# Patient Record
Sex: FEMALE | Race: WHITE | Employment: OTHER | URBAN - METROPOLITAN AREA
[De-identification: names, ages, dates, MRNs, and addresses within clinical notes are randomized per-mention and may not be internally consistent; named-entity substitution may affect disease eponyms.]

---

## 2017-01-03 ENCOUNTER — TELEPHONE (OUTPATIENT)
Dept: ENDOCRINOLOGY CLINIC | Facility: CLINIC | Age: 72
End: 2017-01-03

## 2017-01-03 NOTE — TELEPHONE ENCOUNTER
Patient returned call. Let her know per AM DEXA scan was normal. Patient understands to continue calcium and vitamin D supplementation.

## 2017-01-03 NOTE — TELEPHONE ENCOUNTER
DXA is normal. Please let the patient know. She should c/w vit D and ca supplementation as before.    Thx.

## 2017-03-09 ENCOUNTER — APPOINTMENT (OUTPATIENT)
Dept: LAB | Age: 72
End: 2017-03-09
Attending: INTERNAL MEDICINE
Payer: MEDICARE

## 2017-03-09 ENCOUNTER — TELEPHONE (OUTPATIENT)
Dept: ENDOCRINOLOGY CLINIC | Facility: CLINIC | Age: 72
End: 2017-03-09

## 2017-03-09 DIAGNOSIS — E55.9 VITAMIN D DEFICIENCY: ICD-10-CM

## 2017-03-09 DIAGNOSIS — E03.9 HYPOTHYROIDISM, UNSPECIFIED TYPE: ICD-10-CM

## 2017-03-09 LAB
T4 FREE SERPL-MCNC: 0.89 NG/DL (ref 0.58–1.64)
TSH SERPL-ACNC: 4.48 UIU/ML (ref 0.34–5.6)

## 2017-03-09 PROCEDURE — 82306 VITAMIN D 25 HYDROXY: CPT

## 2017-03-09 PROCEDURE — 36415 COLL VENOUS BLD VENIPUNCTURE: CPT

## 2017-03-09 PROCEDURE — 84439 ASSAY OF FREE THYROXINE: CPT

## 2017-03-09 PROCEDURE — 84443 ASSAY THYROID STIM HORMONE: CPT

## 2017-03-09 NOTE — TELEPHONE ENCOUNTER
Please let the patient know her thyroid labs look better. When I saw her last time, she was on 100 mcg daily and was not feeling well. Dose was changed to alternate day 100/112 and her labs are better now. How does she feel? Any better?  If not, we can go

## 2017-03-09 NOTE — TELEPHONE ENCOUNTER
Called [facility] regarding results. She states she is starting to feel better on alternating doses.  She would like to keep alternating 100mcg and 112cg for now as symptoms improving and she will contact office if this changes and she wants to try increased dose

## 2017-03-10 LAB — 25(OH)D3 SERPL-MCNC: 27.8 NG/ML

## 2017-03-21 RX ORDER — LEVOTHYROXINE SODIUM 112 MCG
TABLET ORAL
Qty: 90 TABLET | Refills: 0 | Status: SHIPPED | OUTPATIENT
Start: 2017-03-21 | End: 2017-09-15

## 2017-06-28 PROBLEM — E66.01 SEVERE OBESITY (BMI 35.0-39.9) WITH COMORBIDITY (HCC): Chronic | Status: ACTIVE | Noted: 2017-06-28

## 2017-06-28 RX ORDER — METOPROLOL SUCCINATE 25 MG/1
TABLET, EXTENDED RELEASE ORAL
Qty: 180 TABLET | Refills: 0 | Status: SHIPPED | OUTPATIENT
Start: 2017-06-28 | End: 2017-11-28

## 2017-08-15 ENCOUNTER — APPOINTMENT (OUTPATIENT)
Dept: GENERAL RADIOLOGY | Facility: HOSPITAL | Age: 72
End: 2017-08-15
Attending: EMERGENCY MEDICINE
Payer: MEDICARE

## 2017-08-15 ENCOUNTER — HOSPITAL ENCOUNTER (EMERGENCY)
Facility: HOSPITAL | Age: 72
Discharge: HOME | End: 2017-08-15
Attending: EMERGENCY MEDICINE
Payer: MEDICARE

## 2017-08-15 VITALS
OXYGEN SATURATION: 98 % | DIASTOLIC BLOOD PRESSURE: 75 MMHG | RESPIRATION RATE: 20 BRPM | BODY MASS INDEX: 37.39 KG/M2 | SYSTOLIC BLOOD PRESSURE: 154 MMHG | WEIGHT: 211 LBS | TEMPERATURE: 97 F | HEART RATE: 58 BPM | HEIGHT: 63 IN

## 2017-08-15 DIAGNOSIS — M54.9 BACK PAIN WITHOUT RADIATION: Primary | ICD-10-CM

## 2017-08-15 LAB
BILIRUB UR QL: NEGATIVE
COLOR UR: YELLOW
GLUCOSE UR-MCNC: NEGATIVE MG/DL
HGB UR QL STRIP.AUTO: NEGATIVE
KETONES UR-MCNC: NEGATIVE MG/DL
NITRITE UR QL STRIP.AUTO: NEGATIVE
PH UR: 5 [PH] (ref 5–8)
PROT UR-MCNC: 30 MG/DL
RBC #/AREA URNS AUTO: 8 /HPF
SP GR UR STRIP: 1.02 (ref 1–1.03)
UROBILINOGEN UR STRIP-ACNC: <2
VIT C UR-MCNC: NEGATIVE MG/DL
WBC #/AREA URNS AUTO: 6 /HPF

## 2017-08-15 PROCEDURE — 72100 X-RAY EXAM L-S SPINE 2/3 VWS: CPT | Performed by: EMERGENCY MEDICINE

## 2017-08-15 PROCEDURE — 99283 EMERGENCY DEPT VISIT LOW MDM: CPT

## 2017-08-15 PROCEDURE — 81001 URINALYSIS AUTO W/SCOPE: CPT | Performed by: EMERGENCY MEDICINE

## 2017-08-15 PROCEDURE — 87086 URINE CULTURE/COLONY COUNT: CPT | Performed by: EMERGENCY MEDICINE

## 2017-08-15 RX ORDER — LIDOCAINE 50 MG/G
1 PATCH TOPICAL ONCE
Status: DISCONTINUED | OUTPATIENT
Start: 2017-08-15 | End: 2017-08-15

## 2017-08-15 RX ORDER — IBUPROFEN 400 MG/1
400 TABLET ORAL ONCE
Status: COMPLETED | OUTPATIENT
Start: 2017-08-15 | End: 2017-08-15

## 2017-08-15 RX ORDER — IBUPROFEN 600 MG/1
600 TABLET ORAL EVERY 8 HOURS PRN
Qty: 20 TABLET | Refills: 0 | Status: SHIPPED | OUTPATIENT
Start: 2017-08-15 | End: 2017-08-22

## 2017-08-15 NOTE — ED PROVIDER NOTES
Patient Seen in: [facility] AND [facility] Emergency Department    History   Patient presents with:  Back Pain (musculoskeletal)    Stated Complaint:     HPI    81-year-old female with history of hypertension, hypothyroidism, Ménière's disease, vertigo, here wit Surgical History:  No date: CARPAL TUNNEL RELEASE  No date: CHOLECYSTECTOMY  No date: COLONOSCOPY  2006: DEBRIDEMENT OF NAIL(S), 1-5      Comment: debridement of the med border of the left                great toenail  03-: ELECTROCARDIOGRAM, COMPLE • Hypertension Mother    • Thyroid Disorder Mother      thyroid problems   • Kidney Disease Mother      Chronic kidney disease   • Cancer Mother      Cancer-breast   • Breast Cancer Mother 80   • Cancer Brother      Cancer-prostate       Smoking status: and well-nourished. No distress. HENT:   Head: Normocephalic and atraumatic. Mouth/Throat: Oropharynx is clear and moist.   Eyes: EOM are normal. Pupils are equal, round, and reactive to light. Neck: Normal range of motion.    Cardiovascular: Normal r Seen /HPF       Imaging:  Xr Lumbar Spine (min 2 Views) (cpt=72100)    Result Date: 8/15/2017  CONCLUSION:  1. Mild degenerative anterior spondylolisthesis L4 relative L5 2. Mild S-shaped scoliosis lumbar spine. Multilevel spondylosis.  3. Mild chronic wedg

## 2017-09-15 ENCOUNTER — OFFICE VISIT (OUTPATIENT)
Dept: INTERNAL MEDICINE CLINIC | Facility: CLINIC | Age: 72
End: 2017-09-15

## 2017-09-15 ENCOUNTER — TELEPHONE (OUTPATIENT)
Dept: INTERNAL MEDICINE CLINIC | Facility: CLINIC | Age: 72
End: 2017-09-15

## 2017-09-15 ENCOUNTER — LAB ENCOUNTER (OUTPATIENT)
Dept: LAB | Age: 72
End: 2017-09-15
Attending: INTERNAL MEDICINE
Payer: MEDICARE

## 2017-09-15 VITALS
OXYGEN SATURATION: 97 % | HEIGHT: 63 IN | BODY MASS INDEX: 37.39 KG/M2 | HEART RATE: 61 BPM | SYSTOLIC BLOOD PRESSURE: 138 MMHG | TEMPERATURE: 99 F | DIASTOLIC BLOOD PRESSURE: 82 MMHG | WEIGHT: 211 LBS

## 2017-09-15 DIAGNOSIS — I10 ESSENTIAL HYPERTENSION: Primary | ICD-10-CM

## 2017-09-15 DIAGNOSIS — E03.9 HYPOTHYROIDISM, UNSPECIFIED TYPE: ICD-10-CM

## 2017-09-15 DIAGNOSIS — R73.9 ELEVATED BLOOD SUGAR: ICD-10-CM

## 2017-09-15 DIAGNOSIS — I10 ESSENTIAL HYPERTENSION: ICD-10-CM

## 2017-09-15 DIAGNOSIS — G89.29 CHRONIC MIDLINE LOW BACK PAIN WITHOUT SCIATICA: ICD-10-CM

## 2017-09-15 DIAGNOSIS — Z00.00 ROUTINE HEALTH MAINTENANCE: ICD-10-CM

## 2017-09-15 DIAGNOSIS — E55.9 VITAMIN D DEFICIENCY: ICD-10-CM

## 2017-09-15 DIAGNOSIS — E03.9 ACQUIRED HYPOTHYROIDISM: ICD-10-CM

## 2017-09-15 DIAGNOSIS — Z12.11 COLON CANCER SCREENING: ICD-10-CM

## 2017-09-15 DIAGNOSIS — R82.90 ABNORMAL URINALYSIS: ICD-10-CM

## 2017-09-15 DIAGNOSIS — M54.50 CHRONIC MIDLINE LOW BACK PAIN WITHOUT SCIATICA: ICD-10-CM

## 2017-09-15 LAB
25(OH)D3 SERPL-MCNC: 30.6 NG/ML
ALBUMIN SERPL BCP-MCNC: 4.2 G/DL (ref 3.5–4.8)
ALBUMIN/GLOB SERPL: 1.3 {RATIO} (ref 1–2)
ALP SERPL-CCNC: 63 U/L (ref 32–100)
ALT SERPL-CCNC: 22 U/L (ref 14–54)
ANION GAP SERPL CALC-SCNC: 9 MMOL/L (ref 0–18)
AST SERPL-CCNC: 23 U/L (ref 15–41)
BACTERIA UR QL AUTO: NEGATIVE /HPF
BASOPHILS # BLD: 0 K/UL (ref 0–0.2)
BASOPHILS NFR BLD: 0 %
BILIRUB SERPL-MCNC: 0.6 MG/DL (ref 0.3–1.2)
BILIRUB UR QL: NEGATIVE
BUN SERPL-MCNC: 8 MG/DL (ref 8–20)
BUN/CREAT SERPL: 8.3 (ref 10–20)
CALCIUM SERPL-MCNC: 9.9 MG/DL (ref 8.5–10.5)
CHLORIDE SERPL-SCNC: 103 MMOL/L (ref 95–110)
CHOLEST SERPL-MCNC: 179 MG/DL (ref 110–200)
CO2 SERPL-SCNC: 29 MMOL/L (ref 22–32)
COLOR UR: YELLOW
CREAT SERPL-MCNC: 0.96 MG/DL (ref 0.5–1.5)
EOSINOPHIL # BLD: 0.2 K/UL (ref 0–0.7)
EOSINOPHIL NFR BLD: 2 %
ERYTHROCYTE [DISTWIDTH] IN BLOOD BY AUTOMATED COUNT: 13.8 % (ref 11–15)
GLOBULIN PLAS-MCNC: 3.2 G/DL (ref 2.5–3.7)
GLUCOSE SERPL-MCNC: 102 MG/DL (ref 70–99)
GLUCOSE UR-MCNC: NEGATIVE MG/DL
HBA1C MFR BLD: 5.9 % (ref 4–6)
HCT VFR BLD AUTO: 41.9 % (ref 35–48)
HDLC SERPL-MCNC: 54 MG/DL
HGB BLD-MCNC: 14 G/DL (ref 12–16)
HGB UR QL STRIP.AUTO: NEGATIVE
KETONES UR-MCNC: NEGATIVE MG/DL
LDLC SERPL CALC-MCNC: 107 MG/DL (ref 0–99)
LYMPHOCYTES # BLD: 1.6 K/UL (ref 1–4)
LYMPHOCYTES NFR BLD: 18 %
MCH RBC QN AUTO: 28.4 PG (ref 27–32)
MCHC RBC AUTO-ENTMCNC: 33.3 G/DL (ref 32–37)
MCV RBC AUTO: 85.3 FL (ref 80–100)
MONOCYTES # BLD: 0.6 K/UL (ref 0–1)
MONOCYTES NFR BLD: 7 %
NEUTROPHILS # BLD AUTO: 6.4 K/UL (ref 1.8–7.7)
NEUTROPHILS NFR BLD: 73 %
NITRITE UR QL STRIP.AUTO: NEGATIVE
NONHDLC SERPL-MCNC: 125 MG/DL
OSMOLALITY UR CALC.SUM OF ELEC: 291 MOSM/KG (ref 275–295)
PH UR: 6 [PH] (ref 5–8)
PLATELET # BLD AUTO: 301 K/UL (ref 140–400)
PMV BLD AUTO: 8.2 FL (ref 7.4–10.3)
POTASSIUM SERPL-SCNC: 5.2 MMOL/L (ref 3.3–5.1)
PROT SERPL-MCNC: 7.4 G/DL (ref 5.9–8.4)
PROT UR-MCNC: NEGATIVE MG/DL
RBC # BLD AUTO: 4.91 M/UL (ref 3.7–5.4)
RBC #/AREA URNS AUTO: <1 /HPF
SODIUM SERPL-SCNC: 141 MMOL/L (ref 136–144)
SP GR UR STRIP: 1.01 (ref 1–1.03)
T4 FREE SERPL-MCNC: 1.11 NG/DL (ref 0.58–1.64)
TRIGL SERPL-MCNC: 91 MG/DL (ref 1–149)
TSH SERPL-ACNC: 9.48 UIU/ML (ref 0.45–5.33)
UROBILINOGEN UR STRIP-ACNC: <2
VIT C UR-MCNC: NEGATIVE MG/DL
WBC # BLD AUTO: 8.8 K/UL (ref 4–11)
WBC #/AREA URNS AUTO: 1 /HPF

## 2017-09-15 PROCEDURE — 80061 LIPID PANEL: CPT

## 2017-09-15 PROCEDURE — 85025 COMPLETE CBC W/AUTO DIFF WBC: CPT

## 2017-09-15 PROCEDURE — 81001 URINALYSIS AUTO W/SCOPE: CPT

## 2017-09-15 PROCEDURE — 36415 COLL VENOUS BLD VENIPUNCTURE: CPT

## 2017-09-15 PROCEDURE — 82306 VITAMIN D 25 HYDROXY: CPT

## 2017-09-15 PROCEDURE — 99214 OFFICE O/P EST MOD 30 MIN: CPT | Performed by: INTERNAL MEDICINE

## 2017-09-15 PROCEDURE — 83036 HEMOGLOBIN GLYCOSYLATED A1C: CPT

## 2017-09-15 PROCEDURE — 87086 URINE CULTURE/COLONY COUNT: CPT

## 2017-09-15 PROCEDURE — 87186 SC STD MICRODIL/AGAR DIL: CPT

## 2017-09-15 PROCEDURE — 87088 URINE BACTERIA CULTURE: CPT

## 2017-09-15 PROCEDURE — G0463 HOSPITAL OUTPT CLINIC VISIT: HCPCS | Performed by: INTERNAL MEDICINE

## 2017-09-15 PROCEDURE — 84443 ASSAY THYROID STIM HORMONE: CPT

## 2017-09-15 PROCEDURE — 84439 ASSAY OF FREE THYROXINE: CPT

## 2017-09-15 PROCEDURE — 80053 COMPREHEN METABOLIC PANEL: CPT

## 2017-09-15 RX ORDER — LEVOTHYROXINE SODIUM 112 UG/1
112 TABLET ORAL
Qty: 90 TABLET | Refills: 3 | Status: SHIPPED | OUTPATIENT
Start: 2017-09-15 | End: 2017-10-20

## 2017-09-15 RX ORDER — LEVOTHYROXINE SODIUM 100 MCG
TABLET ORAL
Qty: 90 TABLET | Refills: 0 | Status: CANCELLED | OUTPATIENT
Start: 2017-09-15

## 2017-09-15 NOTE — PROGRESS NOTES
HPI:   Jose M Wall is a 80year old female presents with the following problems. Patient feels well. She has some stinging lower pelvic pain. No frequency or dysuria. Patient has lower pelvic discomfort before urinating.   Will get urinalysi SYNTHROID 100 MCG Oral Tab Alternate with 112 mcg. Disp: 90 tablet Rfl: 0   Calcium 600 MG Oral Tab Take 600 mg by mouth daily. Disp:  Rfl: 0   Cholecalciferol (VITAMIN D) 1000 UNITS Oral Tab Take 1,000 Units by mouth daily.  Disp:  Rfl:    Meclizine HCl 03- 12-: ELECTROCARDIOGRAM, COMPLETE      Comment: Scan to media tab 12- 12-: ELECTROCARDIOGRAM, COMPLETE      Comment: Scan to media tab 12-  No date: HYSTERECTOMY  1/7/2016: KNEE SCOPE,MED/LAT MENISECTOMY Left      Co hrs.       Social History:   Smoking status: Former Smoker                                                              Packs/day: 0.00      Years: 0.00         Types: Cigarettes  Smokeless tobacco: Never Used                      Alcohol use:  Yes satisfactory control. On therapy. 2. Acquired hypothyroidism  Check TSH. On supplements. Asymptomatic. 3. Chronic midline low back pain without sciatica  No acute pain today. Referred to Dr. Cain Flores.   No that patient had a lumbar x-ray berny

## 2017-09-18 ENCOUNTER — TELEPHONE (OUTPATIENT)
Dept: INTERNAL MEDICINE CLINIC | Facility: CLINIC | Age: 72
End: 2017-09-18

## 2017-09-18 DIAGNOSIS — E87.5 SERUM POTASSIUM ELEVATED: ICD-10-CM

## 2017-09-18 DIAGNOSIS — N30.00 ACUTE CYSTITIS WITHOUT HEMATURIA: Primary | ICD-10-CM

## 2017-09-18 RX ORDER — NITROFURANTOIN 25; 75 MG/1; MG/1
100 CAPSULE ORAL 2 TIMES DAILY
Qty: 10 CAPSULE | Refills: 0 | Status: SHIPPED | OUTPATIENT
Start: 2017-09-18 | End: 2017-09-28

## 2017-09-18 NOTE — TELEPHONE ENCOUNTER
Please notify patient that her labs came out fairly acceptable. There were a few minor abnormalities. She does have some bacteria in the urine. I would like her to take an antibiotic. We can give her Macrobid 100 mg tablets.   Quantity #10.  1 p.o. twic

## 2017-09-19 RX ORDER — NITROFURANTOIN 25; 75 MG/1; MG/1
100 CAPSULE ORAL 2 TIMES DAILY
Qty: 10 CAPSULE | Refills: 0 | Status: SHIPPED | OUTPATIENT
Start: 2017-09-19 | End: 2017-09-28

## 2017-09-19 NOTE — TELEPHONE ENCOUNTER
Message relayed to patient who verbalized understanding. She stated that she does not plan to take prescribed Macrobid; she read the print out provided to her from the pharmacy about potential side effects and does feel comfortable.  This RN attempted to fi

## 2017-09-19 NOTE — TELEPHONE ENCOUNTER
I think for now I would just repeat her potassium and urine culture. We can repeat thyroid function in a month or so. Sometimes the absorption is variable and a repeat test in a month or so returns back to normal.  Will continue same dose of thyroid.

## 2017-09-19 NOTE — TELEPHONE ENCOUNTER
To Dr. Maciel Bond - your message relayed to pt who verbalized understanding.   Pt states she usually waits 1-3 hours after taking thyroid med before eating however she had a busy few days before the blood test and may have eaten sooner that 1 hour after taking

## 2017-09-19 NOTE — TELEPHONE ENCOUNTER
Message noted. With patient's allergies and the way the bacteria is resistant to some antibiotics Macrobid would be the choice to give. If she does not have any symptoms of UTI.  I.e. no frequency or burning may be the infection will clear by itself.   Sh

## 2017-09-20 NOTE — TELEPHONE ENCOUNTER
As FYI  To DR. DOBSON - called patient and relayed message - patient has some burning , but will not take Macrobid now .  Her mother is with her and she wants to wait until her mother goes home

## 2017-09-28 RX ORDER — NITROFURANTOIN 25; 75 MG/1; MG/1
100 CAPSULE ORAL 2 TIMES DAILY
Qty: 10 CAPSULE | Refills: 0 | Status: SHIPPED | OUTPATIENT
Start: 2017-09-28 | End: 2017-10-20

## 2017-09-28 NOTE — TELEPHONE ENCOUNTER
Pt missed placed her medication for her UTI she been so busy caring for her mother she cannot fined them. And stated she is much worse now since she have not been taking the pills.

## 2017-09-28 NOTE — TELEPHONE ENCOUNTER
Spoke with patient. She did  Macrobid, but never started med. Has been caring for her ill mother who has been in the hospital and is now doing better.  Patient reports with everything that has been going on, she misplaced the Macrobid and is very emb

## 2017-09-28 NOTE — TELEPHONE ENCOUNTER
New Rx sent for Macrobid. Pt notified and instructed to follow up with UA and urine culture after treatment is completed. Pt understands and agrees to plan.

## 2017-09-28 NOTE — TELEPHONE ENCOUNTER
Yes.  Okay to call in Macrobid 100 mg tablets. 1 p.o. twice daily. Quantity #10. Repeat urinalysis and urine culture after treatment.

## 2017-10-04 NOTE — TELEPHONE ENCOUNTER
Pt calling back again and does not wish to take the macrobid     She states she read that it is not good for a person 72 and older and she is 80    She states she has this mental thing about taking meds after she reads the side effects     plz call pt to d

## 2017-10-04 NOTE — TELEPHONE ENCOUNTER
Spoke with patient and relayed Dr. Tim Infante message. Patient verbalized an understanding and will have both labs done tomorrow.

## 2017-10-04 NOTE — TELEPHONE ENCOUNTER
Message noted. Please let patient know I placed an order for both urinalysis and urine culture with today's date. Also there is an outstanding potassium order. She can get these done when she wishes.

## 2017-10-04 NOTE — TELEPHONE ENCOUNTER
Pt reported that she never started the [address]. She was quite distraught and apologized multiple times. Denied any symptoms of UTI.  At this point she would like avoid having to take Macrobid if possible and to do a repeat UA as previously discussed to see

## 2017-10-05 ENCOUNTER — APPOINTMENT (OUTPATIENT)
Dept: LAB | Age: 72
End: 2017-10-05
Attending: INTERNAL MEDICINE
Payer: MEDICARE

## 2017-10-05 DIAGNOSIS — N30.00 ACUTE CYSTITIS WITHOUT HEMATURIA: ICD-10-CM

## 2017-10-05 DIAGNOSIS — E87.5 SERUM POTASSIUM ELEVATED: ICD-10-CM

## 2017-10-05 PROCEDURE — 87086 URINE CULTURE/COLONY COUNT: CPT

## 2017-10-05 PROCEDURE — 81001 URINALYSIS AUTO W/SCOPE: CPT

## 2017-10-05 PROCEDURE — 36415 COLL VENOUS BLD VENIPUNCTURE: CPT

## 2017-10-05 PROCEDURE — 84132 ASSAY OF SERUM POTASSIUM: CPT

## 2017-10-06 ENCOUNTER — TELEPHONE (OUTPATIENT)
Dept: INTERNAL MEDICINE CLINIC | Facility: CLINIC | Age: 72
End: 2017-10-06

## 2017-10-06 NOTE — TELEPHONE ENCOUNTER
Please notify patient that her urine test came out good. No infection. Therefore no need for antibiotics. Infection must have cleared by itself.

## 2017-10-09 NOTE — TELEPHONE ENCOUNTER
Spoke with patient and relayed Dr. Rahel Irizarry message. Patient expressed relief over results and verbalized understanding.

## 2017-10-20 ENCOUNTER — OFFICE VISIT (OUTPATIENT)
Dept: ENDOCRINOLOGY CLINIC | Facility: CLINIC | Age: 72
End: 2017-10-20

## 2017-10-20 VITALS
WEIGHT: 211 LBS | HEIGHT: 63.5 IN | DIASTOLIC BLOOD PRESSURE: 79 MMHG | BODY MASS INDEX: 36.92 KG/M2 | HEART RATE: 65 BPM | SYSTOLIC BLOOD PRESSURE: 125 MMHG

## 2017-10-20 DIAGNOSIS — IMO0001 CLASS 2 OBESITY WITH SERIOUS COMORBIDITY AND BODY MASS INDEX (BMI) OF 36.0 TO 36.9 IN ADULT, UNSPECIFIED OBESITY TYPE: ICD-10-CM

## 2017-10-20 DIAGNOSIS — E03.9 HYPOTHYROIDISM, UNSPECIFIED TYPE: ICD-10-CM

## 2017-10-20 DIAGNOSIS — R73.01 ELEVATED FASTING GLUCOSE: Primary | ICD-10-CM

## 2017-10-20 PROBLEM — R73.03 PRE-DIABETES: Status: ACTIVE | Noted: 2017-10-20

## 2017-10-20 PROCEDURE — 99214 OFFICE O/P EST MOD 30 MIN: CPT | Performed by: INTERNAL MEDICINE

## 2017-10-20 PROCEDURE — 36416 COLLJ CAPILLARY BLOOD SPEC: CPT | Performed by: INTERNAL MEDICINE

## 2017-10-20 PROCEDURE — 82962 GLUCOSE BLOOD TEST: CPT | Performed by: INTERNAL MEDICINE

## 2017-10-20 RX ORDER — LEVOTHYROXINE SODIUM 100 MCG
100 TABLET ORAL
Qty: 90 TABLET | Refills: 0 | Status: SHIPPED | OUTPATIENT
Start: 2017-10-20 | End: 2017-12-26

## 2017-10-20 NOTE — PROGRESS NOTES
Return Office Visit     CHIEF COMPLAINT:    Hypothyroidism   Vitamin D deficiency  Osteopenia  Obesity  Pre Diabetes    HISTORY OF PRESENT ILLNESS:  Марина Banegas is a 80year old female who presents for a FU visit for hypothyroidism.   She had initiall Comment:Other reaction(s): CELECOXIB  Clindamycin                 Comment:Other reaction(s):  Altered Heart Rate             Other reaction(s): CLINDAMYCIN  Keflex [Cephalexin]     Swelling  Latex                       Comment:Other reaction(s): LATEX weakness  Genito-Urinary: Negative for: dysuria, frequency  Psychiatric: Negative for:  depression, anxiety  Hematology/Lymphatics: Negative for: bruising, lower extremity edema  Endocrine: Negative for: polyuria, polydypsia  Skin: Negative for: rash, blis Discussed the pathogenesis, natural course of diabetes. Patient understands the importance of glycemic control and the implications of uncontrolled diabetes including Diabetic ketoacidosis and various micro vascular and macrovascular complications.   Disc

## 2017-11-28 NOTE — TELEPHONE ENCOUNTER
To Dr. Susanna Smith - Pt states she is taking metoprolol ER 25mg daily at night. Last refill was for 2 tablets daily but pt stated that was only temporary. Please confirm above pended med. Pharmacy info updated.

## 2017-11-29 RX ORDER — METOPROLOL SUCCINATE 25 MG/1
25 TABLET, EXTENDED RELEASE ORAL DAILY
Qty: 90 TABLET | Refills: 3 | Status: ON HOLD | OUTPATIENT
Start: 2017-11-29 | End: 2018-08-08

## 2017-12-21 ENCOUNTER — APPOINTMENT (OUTPATIENT)
Dept: LAB | Age: 72
End: 2017-12-21
Attending: INTERNAL MEDICINE
Payer: MEDICARE

## 2017-12-21 DIAGNOSIS — E03.9 HYPOTHYROIDISM, UNSPECIFIED TYPE: ICD-10-CM

## 2017-12-21 PROCEDURE — 84443 ASSAY THYROID STIM HORMONE: CPT

## 2017-12-21 PROCEDURE — 36415 COLL VENOUS BLD VENIPUNCTURE: CPT

## 2017-12-21 PROCEDURE — 84439 ASSAY OF FREE THYROXINE: CPT

## 2017-12-22 ENCOUNTER — TELEPHONE (OUTPATIENT)
Dept: ENDOCRINOLOGY CLINIC | Facility: CLINIC | Age: 72
End: 2017-12-22

## 2017-12-22 DIAGNOSIS — E03.9 HYPOTHYROIDISM, UNSPECIFIED TYPE: Primary | ICD-10-CM

## 2017-12-22 NOTE — TELEPHONE ENCOUNTER
Her thyroid labs had indicated the need for higher dose of LT4  She is on 100 mcg daily  On LOV it was dicussed that she could go up to 112 mcg daily  However, she has tried that before but did not like it.    Hence, had wanted to be on the 100 mcg daily an

## 2017-12-22 NOTE — TELEPHONE ENCOUNTER
Called [facility]. Informed her of Dr. Liu Omer message below. She is taking synthroid 100 mcg daily. Patient states that she tried 112 mcg daily and she did not feel well on it.  She has also tried rotating 100/112 mcg last year and did not feel well on that

## 2017-12-26 RX ORDER — LEVOTHYROXINE SODIUM 112 MCG
TABLET ORAL
Qty: 30 TABLET | Refills: 0 | Status: SHIPPED | OUTPATIENT
Start: 2017-12-26 | End: 2018-03-16

## 2017-12-26 RX ORDER — LEVOTHYROXINE SODIUM 100 MCG
TABLET ORAL
Qty: 60 TABLET | Refills: 0 | Status: SHIPPED | OUTPATIENT
Start: 2017-12-26 | End: 2018-04-20

## 2017-12-26 NOTE — TELEPHONE ENCOUNTER
Yes, can prescribe accordingly  Thanks  Repeat TSH, FT4 in 6 -8 weeks  Call ASAP if gets symptomatic  Thanks.

## 2017-12-26 NOTE — TELEPHONE ENCOUNTER
Called [facility]. Per below ok to leave detailed message. Left detailed message with instructions to take 112 mcg every 3rd day and to call if she becomes symptomatic. Described symptoms. Sent Rx according to instructions.

## 2018-01-08 ENCOUNTER — OFFICE VISIT (OUTPATIENT)
Dept: ENDOCRINOLOGY CLINIC | Facility: CLINIC | Age: 73
End: 2018-01-08

## 2018-01-08 VITALS
DIASTOLIC BLOOD PRESSURE: 82 MMHG | BODY MASS INDEX: 36.92 KG/M2 | HEART RATE: 64 BPM | SYSTOLIC BLOOD PRESSURE: 132 MMHG | WEIGHT: 211 LBS | HEIGHT: 63.5 IN

## 2018-01-08 DIAGNOSIS — E03.9 HYPOTHYROIDISM, UNSPECIFIED TYPE: Primary | ICD-10-CM

## 2018-01-08 DIAGNOSIS — M85.80 OSTEOPENIA, UNSPECIFIED LOCATION: ICD-10-CM

## 2018-01-08 DIAGNOSIS — E55.9 VITAMIN D DEFICIENCY: ICD-10-CM

## 2018-01-08 DIAGNOSIS — E66.01 CLASS 2 SEVERE OBESITY DUE TO EXCESS CALORIES WITH SERIOUS COMORBIDITY AND BODY MASS INDEX (BMI) OF 36.0 TO 36.9 IN ADULT (HCC): ICD-10-CM

## 2018-01-08 PROCEDURE — G0463 HOSPITAL OUTPT CLINIC VISIT: HCPCS | Performed by: INTERNAL MEDICINE

## 2018-01-08 PROCEDURE — 99213 OFFICE O/P EST LOW 20 MIN: CPT | Performed by: INTERNAL MEDICINE

## 2018-01-08 NOTE — PROGRESS NOTES
Return Office Visit     CHIEF COMPLAINT:    Hypothyroidism   Vitamin D deficiency  Osteopenia  Obesity  Pre Diabetes    HISTORY OF PRESENT ILLNESS:  Denise Bergman is a 71year old female who presents for a FU visit for hypothyroidism.   She had initiall Cholecalciferol (VITAMIN D) 1000 UNITS Oral Tab Take 1,000 Units by mouth daily. Disp:  Rfl:    Meclizine HCl (ANTIVERT) 12.5 MG Oral Tab Take 1 tablet (12.5 mg total) by mouth daily.  (Patient taking differently: Take 12.5 mg by mouth 3 (three) times margarito history marked as reviewed. See past family history marked as reviewed. See past social history marked as reviewed.     ASSESSMENTS:     REVIEW OF SYSTEMS:  Constitutional: Negative for:  fatigue, cold/heat intolerance  Eyes: Negative for:  Visual changes Discussed administration. Osteopenia:  FU DXA in 2016 better: Bone mineral content in the left femur and lumbar spine is now in the normal range,  increased up to 5.5% since the prior study.   The FRAX calculated 10 year risk of any major osteoporot

## 2018-03-16 ENCOUNTER — TELEPHONE (OUTPATIENT)
Dept: INTERNAL MEDICINE CLINIC | Facility: CLINIC | Age: 73
End: 2018-03-16

## 2018-03-16 ENCOUNTER — OFFICE VISIT (OUTPATIENT)
Dept: INTERNAL MEDICINE CLINIC | Facility: CLINIC | Age: 73
End: 2018-03-16

## 2018-03-16 VITALS
TEMPERATURE: 98 F | BODY MASS INDEX: 36.57 KG/M2 | SYSTOLIC BLOOD PRESSURE: 140 MMHG | WEIGHT: 209 LBS | DIASTOLIC BLOOD PRESSURE: 80 MMHG | HEIGHT: 63.5 IN | HEART RATE: 60 BPM

## 2018-03-16 DIAGNOSIS — Z00.00 MEDICARE ANNUAL WELLNESS VISIT, INITIAL: Primary | ICD-10-CM

## 2018-03-16 DIAGNOSIS — I10 ESSENTIAL HYPERTENSION: Primary | ICD-10-CM

## 2018-03-16 DIAGNOSIS — Z12.31 VISIT FOR SCREENING MAMMOGRAM: ICD-10-CM

## 2018-03-16 DIAGNOSIS — R73.9 ELEVATED BLOOD SUGAR: ICD-10-CM

## 2018-03-16 DIAGNOSIS — R73.03 PRE-DIABETES: ICD-10-CM

## 2018-03-16 DIAGNOSIS — I10 ESSENTIAL HYPERTENSION: ICD-10-CM

## 2018-03-16 DIAGNOSIS — D73.4 SPLENIC CYST: ICD-10-CM

## 2018-03-16 DIAGNOSIS — E03.9 HYPOTHYROIDISM, UNSPECIFIED TYPE: ICD-10-CM

## 2018-03-16 DIAGNOSIS — Z00.00 ROUTINE HEALTH MAINTENANCE: ICD-10-CM

## 2018-03-16 DIAGNOSIS — Z12.11 COLON CANCER SCREENING: ICD-10-CM

## 2018-03-16 PROCEDURE — 99397 PER PM REEVAL EST PAT 65+ YR: CPT | Performed by: INTERNAL MEDICINE

## 2018-03-16 PROCEDURE — 96160 PT-FOCUSED HLTH RISK ASSMT: CPT | Performed by: INTERNAL MEDICINE

## 2018-03-16 PROCEDURE — G0439 PPPS, SUBSEQ VISIT: HCPCS | Performed by: INTERNAL MEDICINE

## 2018-03-16 NOTE — TELEPHONE ENCOUNTER
Melida PSR sent questionare to patient for medicare annual visit with return envelope -once patient mails assessment back we need to update in office visit from 3/16/2018- as FYI to   Nursing to F/U (  once you receive it back please let nurses  know

## 2018-03-16 NOTE — TELEPHONE ENCOUNTER
Please notify patient that during our visit this morning and wanting to cold her visit for annual physical that [facility] wanted I neglected to remember that there were questions that should have been asked for Medicare wellness exam.  Please go ah

## 2018-03-16 NOTE — PROGRESS NOTES
HPI:   Helga Freitas is a 71year old female presents with the following problems. Patient presents for Medicare annual wellness physical.  Patient received notification from [employer] that this is due. She feels well.   She has no complai breakfast. Take 100 mcg tablet daily ) Disp: 60 tablet Rfl: 0   Metoprolol Succinate ER 25 MG Oral Tablet 24 Hr Take 1 tablet (25 mg total) by mouth daily. Disp: 90 tablet Rfl: 3   Calcium 600 MG Oral Tab Take 600 mg by mouth daily.  Disp:  Rfl: 0   Choleca great toenail  03-: ELECTROCARDIOGRAM, COMPLETE      Comment: Scan to media tab 03- 12-: ELECTROCARDIOGRAM, COMPLETE      Comment: Scan to media tab 12- 12-: ELECTROCARDIOGRAM, COMPLETE      Comment: Scan to media ta breath or cough  CARDIOVASCULAR:  denies chest pain or palpitations  GI:  denies abdominal pain, blood in stool, changes in bowels  :  denies dysuria or frequency  MUSCULOSKELETAL:  denies back pain or joint pain  NEURO:  denies headaches or dizziness  P 1-Yes     Have you had any memory issues?: 0-No    Fall/Risk Scorin          Depression Screening (PHQ-2/PHQ-9): Over the LAST 2 WEEKS   Little interest or pleasure in doing things (over the last two weeks)?: Not at all    Feeling down, depressed, or h Annually     Bone Density Screening      Dexascan Every two years Last Dexa Scan:   XR DEXA BONE DENSITOMETRY (CPT=77080) 12/29/2016    No flowsheet data found.    Pap and Pelvic      Pap: Every 3 yrs age 21-68 or Pap+HPV every 5 yrs age 33-67, age 72 and o 12/16/2016 6.0 (A)     Glycohemoglobin (HgA1c) (%)   Date Value   09/15/2017 5.9    No flowsheet data found.     Creat/alb ratio  Annually      LDL  Annually LDL-CHOLESTEROL (mg/dL (calc))   Date Value   04/21/2011 100     LDL Cholesterol (mg/dL)   Date V

## 2018-03-19 NOTE — TELEPHONE ENCOUNTER
Please let patient know that she will get this Medicare annual visit questionnaire in the mail so she will know what to do with it.   Please let her know that this will allow us to complete her annual physical for [facility] in [place]

## 2018-03-21 NOTE — TELEPHONE ENCOUNTER
Informed patient that Medicare questionnaire has been mailed to her and asked that she fill it out and return it to us. Patient understood.

## 2018-03-31 ENCOUNTER — HOSPITAL ENCOUNTER (OUTPATIENT)
Dept: MAMMOGRAPHY | Age: 73
Discharge: HOME | End: 2018-03-31
Attending: INTERNAL MEDICINE
Payer: MEDICARE

## 2018-03-31 DIAGNOSIS — Z12.31 VISIT FOR SCREENING MAMMOGRAM: ICD-10-CM

## 2018-03-31 PROCEDURE — 77067 SCR MAMMO BI INCL CAD: CPT | Performed by: INTERNAL MEDICINE

## 2018-04-02 ENCOUNTER — TELEPHONE (OUTPATIENT)
Dept: INTERNAL MEDICINE CLINIC | Facility: CLINIC | Age: 73
End: 2018-04-02

## 2018-04-17 ENCOUNTER — APPOINTMENT (OUTPATIENT)
Dept: LAB | Age: 73
End: 2018-04-17
Attending: INTERNAL MEDICINE
Payer: MEDICARE

## 2018-04-17 DIAGNOSIS — R73.9 ELEVATED BLOOD SUGAR: ICD-10-CM

## 2018-04-17 DIAGNOSIS — I10 ESSENTIAL HYPERTENSION: ICD-10-CM

## 2018-04-17 DIAGNOSIS — E03.9 HYPOTHYROIDISM, UNSPECIFIED TYPE: ICD-10-CM

## 2018-04-17 PROCEDURE — 84443 ASSAY THYROID STIM HORMONE: CPT

## 2018-04-17 PROCEDURE — 83036 HEMOGLOBIN GLYCOSYLATED A1C: CPT

## 2018-04-17 PROCEDURE — 84439 ASSAY OF FREE THYROXINE: CPT

## 2018-04-17 PROCEDURE — 36415 COLL VENOUS BLD VENIPUNCTURE: CPT

## 2018-04-17 PROCEDURE — 80061 LIPID PANEL: CPT

## 2018-04-17 PROCEDURE — 80053 COMPREHEN METABOLIC PANEL: CPT

## 2018-04-20 ENCOUNTER — TELEPHONE (OUTPATIENT)
Dept: INTERNAL MEDICINE CLINIC | Facility: CLINIC | Age: 73
End: 2018-04-20

## 2018-04-20 ENCOUNTER — OFFICE VISIT (OUTPATIENT)
Dept: ENDOCRINOLOGY CLINIC | Facility: CLINIC | Age: 73
End: 2018-04-20

## 2018-04-20 VITALS
TEMPERATURE: 98 F | BODY MASS INDEX: 37.1 KG/M2 | SYSTOLIC BLOOD PRESSURE: 132 MMHG | WEIGHT: 212 LBS | HEART RATE: 71 BPM | RESPIRATION RATE: 18 BRPM | DIASTOLIC BLOOD PRESSURE: 76 MMHG | HEIGHT: 63.5 IN

## 2018-04-20 DIAGNOSIS — E03.9 HYPOTHYROIDISM, UNSPECIFIED TYPE: Primary | ICD-10-CM

## 2018-04-20 DIAGNOSIS — E55.9 VITAMIN D DEFICIENCY: ICD-10-CM

## 2018-04-20 DIAGNOSIS — M85.80 OSTEOPENIA, UNSPECIFIED LOCATION: ICD-10-CM

## 2018-04-20 PROCEDURE — 99213 OFFICE O/P EST LOW 20 MIN: CPT | Performed by: INTERNAL MEDICINE

## 2018-04-20 PROCEDURE — G0463 HOSPITAL OUTPT CLINIC VISIT: HCPCS | Performed by: INTERNAL MEDICINE

## 2018-04-20 RX ORDER — LEVOTHYROXINE SODIUM 112 UG/1
112 TABLET ORAL
Qty: 90 TABLET | Refills: 0 | Status: SHIPPED | OUTPATIENT
Start: 2018-04-20 | End: 2018-05-31

## 2018-04-20 NOTE — PROGRESS NOTES
Return Office Visit     CHIEF COMPLAINT:    Hypothyroidism   Vitamin D deficiency  Osteopenia  Obesity  Pre Diabetes    HISTORY OF PRESENT ILLNESS:  Lubna Pierre is a 71year old female who presents for a FU visit for hypothyroidism and Osteopenia. Meclizine HCl (ANTIVERT) 12.5 MG Oral Tab Take 1 tablet (12.5 mg total) by mouth daily.  (Patient taking differently: Take 12.5 mg by mouth 3 (three) times daily as needed for Dizziness.  ) Disp: 90 tablet Rfl: 3         ALLERGY:    Adhesive Tape gain  Eyes: Negative for:  Visual changes, proptosis, blurring  ENT: Negative for:  dysphagia, neck swelling, dysphonia  Respiratory: Negative for:  dyspnea, cough  Cardiovascular: Negative for:  chest pain, orthopnea,  intermittent palpitations  GI: Negat Osteopenia:  FU DXA in 2016 better: Bone mineral content in the left femur and lumbar spine is now in the normal range,  increased up to 5.5% since the prior study.   The FRAX calculated 10 year risk of any major osteoporotic fracture is 7.9%, hip fracture

## 2018-04-20 NOTE — TELEPHONE ENCOUNTER
Calling for lab results  Reyna Schwartz May leave message on cell phone  Routed to [facility]

## 2018-04-23 NOTE — TELEPHONE ENCOUNTER
Please notify patient that her labs look satisfactory to me. Her blood sugar was only 88. This is a good sign. The hemoglobin A1c which is an average of blood sugars over 2 months was just slightly high.   For now nothing else needs to be done other than

## 2018-04-24 PROBLEM — K76.0 FATTY LIVER: Status: RESOLVED | Noted: 2018-04-24 | Resolved: 2018-04-24

## 2018-04-24 PROBLEM — R73.03 PRE-DIABETES: Status: RESOLVED | Noted: 2017-10-20 | Resolved: 2018-04-24

## 2018-04-24 PROBLEM — M75.101 ROTATOR CUFF TEAR, RIGHT: Status: RESOLVED | Noted: 2018-04-24 | Resolved: 2018-04-24

## 2018-04-24 PROBLEM — K21.9 ESOPHAGEAL REFLUX: Status: RESOLVED | Noted: 2018-04-24 | Resolved: 2018-04-24

## 2018-04-24 PROBLEM — H93.19 TINNITUS: Status: RESOLVED | Noted: 2018-04-24 | Resolved: 2018-04-24

## 2018-05-25 ENCOUNTER — HOSPITAL ENCOUNTER (EMERGENCY)
Facility: HOSPITAL | Age: 73
Discharge: HOME | End: 2018-05-25
Attending: EMERGENCY MEDICINE
Payer: MEDICARE

## 2018-05-25 ENCOUNTER — APPOINTMENT (OUTPATIENT)
Dept: CT IMAGING | Facility: HOSPITAL | Age: 73
End: 2018-05-25
Attending: EMERGENCY MEDICINE
Payer: MEDICARE

## 2018-05-25 VITALS
TEMPERATURE: 98 F | WEIGHT: 210 LBS | BODY MASS INDEX: 35.85 KG/M2 | HEIGHT: 64 IN | RESPIRATION RATE: 20 BRPM | OXYGEN SATURATION: 96 % | SYSTOLIC BLOOD PRESSURE: 143 MMHG | DIASTOLIC BLOOD PRESSURE: 74 MMHG | HEART RATE: 64 BPM

## 2018-05-25 DIAGNOSIS — E87.1 HYPONATREMIA: Primary | ICD-10-CM

## 2018-05-25 PROCEDURE — 85025 COMPLETE CBC W/AUTO DIFF WBC: CPT | Performed by: EMERGENCY MEDICINE

## 2018-05-25 PROCEDURE — 96374 THER/PROPH/DIAG INJ IV PUSH: CPT

## 2018-05-25 PROCEDURE — 80048 BASIC METABOLIC PNL TOTAL CA: CPT | Performed by: EMERGENCY MEDICINE

## 2018-05-25 PROCEDURE — 96361 HYDRATE IV INFUSION ADD-ON: CPT

## 2018-05-25 PROCEDURE — 80076 HEPATIC FUNCTION PANEL: CPT | Performed by: EMERGENCY MEDICINE

## 2018-05-25 PROCEDURE — 84484 ASSAY OF TROPONIN QUANT: CPT | Performed by: EMERGENCY MEDICINE

## 2018-05-25 PROCEDURE — 82550 ASSAY OF CK (CPK): CPT | Performed by: EMERGENCY MEDICINE

## 2018-05-25 PROCEDURE — 83690 ASSAY OF LIPASE: CPT | Performed by: EMERGENCY MEDICINE

## 2018-05-25 PROCEDURE — 99285 EMERGENCY DEPT VISIT HI MDM: CPT

## 2018-05-25 PROCEDURE — 93005 ELECTROCARDIOGRAM TRACING: CPT

## 2018-05-25 PROCEDURE — 81001 URINALYSIS AUTO W/SCOPE: CPT | Performed by: EMERGENCY MEDICINE

## 2018-05-25 PROCEDURE — 70450 CT HEAD/BRAIN W/O DYE: CPT | Performed by: EMERGENCY MEDICINE

## 2018-05-25 PROCEDURE — 93010 ELECTROCARDIOGRAM REPORT: CPT | Performed by: EMERGENCY MEDICINE

## 2018-05-25 RX ORDER — ONDANSETRON 2 MG/ML
4 INJECTION INTRAMUSCULAR; INTRAVENOUS ONCE
Status: COMPLETED | OUTPATIENT
Start: 2018-05-25 | End: 2018-05-25

## 2018-05-26 NOTE — ED PROVIDER NOTES
Patient Seen in: [facility] AND [facility] Emergency Department     History   Patient presents with:  Headache (neurologic)    Stated Complaint: headache, shortness of breath, palpatations     HPI    71year old female reports that she was working out in her yard, 03- 12-: ELECTROCARDIOGRAM, COMPLETE      Comment: Scan to media tab 12- 12-: ELECTROCARDIOGRAM, COMPLETE      Comment: Scan to media tab 12-  No date: HYSTERECTOMY  1/7/2016: KNEE SCOPE,MED/LAT MENISECTOMY Left      Co Other reaction(s): METHYLPREDNISOLONE             Other reaction(s): METHYLPREDNISOLONE SOD SUCC  Metronidazole               Comment:Other reaction(s): METRONIDAZOLE HCL  Omeprazole                  Comment:Other reaction(s): headaches             O Resp 20   Ht 162.6 cm (5' 4\")   Wt 95.3 kg   SpO2 96%   BMI 36.05 kg/m²         Physical Exam   Constitutional: She is oriented to person, place, and time. She is cooperative. Non-toxic appearance. She does not have a sickly appearance.  She does not appe limits   CBC W/ DIFFERENTIAL - Abnormal; Notable for the following:     WBC 11.7 (*)     Neutrophil Absolute 9.4 (*)     All other components within normal limits   HEPATIC FUNCTION PANEL (7) - Normal   LIPASE - Normal   TROPONIN I - Normal   CK CREATINE K available prior medical records for any recent pertinent discharge summaries, testing, and procedures and reviewed those reports.      Radiology Interpretation:   Radiology reports and images reviewed personally and are negative for emergent cause of patien [id number]  [phone]    Schedule an appointment as soon as possible for a visit        Medications Prescribed:  Current Discharge Medication List            Anticipatory guidance, discharge instructions upon discharge, disease/injury specific precautio

## 2018-05-26 NOTE — ED INITIAL ASSESSMENT (HPI)
Frontal HA that began this afternoon after mowing the lawn today, +nausea, +diarrhea,   +dizzy, feels like her vision is off.

## 2018-05-29 ENCOUNTER — OFFICE VISIT (OUTPATIENT)
Dept: INTERNAL MEDICINE CLINIC | Facility: CLINIC | Age: 73
End: 2018-05-29

## 2018-05-29 ENCOUNTER — TELEPHONE (OUTPATIENT)
Dept: INTERNAL MEDICINE CLINIC | Facility: CLINIC | Age: 73
End: 2018-05-29

## 2018-05-29 ENCOUNTER — LAB ENCOUNTER (OUTPATIENT)
Dept: LAB | Age: 73
End: 2018-05-29
Attending: INTERNAL MEDICINE
Payer: MEDICARE

## 2018-05-29 VITALS
HEART RATE: 64 BPM | SYSTOLIC BLOOD PRESSURE: 130 MMHG | DIASTOLIC BLOOD PRESSURE: 74 MMHG | TEMPERATURE: 99 F | HEIGHT: 63.5 IN | BODY MASS INDEX: 36.57 KG/M2 | WEIGHT: 209 LBS

## 2018-05-29 DIAGNOSIS — E03.9 HYPOTHYROIDISM, UNSPECIFIED TYPE: ICD-10-CM

## 2018-05-29 DIAGNOSIS — E87.1 HYPONATREMIA: Primary | ICD-10-CM

## 2018-05-29 DIAGNOSIS — E55.9 VITAMIN D DEFICIENCY: ICD-10-CM

## 2018-05-29 DIAGNOSIS — E03.9 ACQUIRED HYPOTHYROIDISM: ICD-10-CM

## 2018-05-29 DIAGNOSIS — Z00.00 ROUTINE HEALTH MAINTENANCE: ICD-10-CM

## 2018-05-29 DIAGNOSIS — E87.1 HYPONATREMIA: ICD-10-CM

## 2018-05-29 DIAGNOSIS — I10 ESSENTIAL HYPERTENSION: ICD-10-CM

## 2018-05-29 DIAGNOSIS — R00.2 PALPITATIONS: ICD-10-CM

## 2018-05-29 PROBLEM — E66.01 SEVERE OBESITY (BMI 35.0-39.9) WITH COMORBIDITY (HCC): Chronic | Status: ACTIVE | Noted: 2018-05-29

## 2018-05-29 PROCEDURE — 84439 ASSAY OF FREE THYROXINE: CPT

## 2018-05-29 PROCEDURE — 80048 BASIC METABOLIC PNL TOTAL CA: CPT

## 2018-05-29 PROCEDURE — 99214 OFFICE O/P EST MOD 30 MIN: CPT | Performed by: INTERNAL MEDICINE

## 2018-05-29 PROCEDURE — 85025 COMPLETE CBC W/AUTO DIFF WBC: CPT

## 2018-05-29 PROCEDURE — 82306 VITAMIN D 25 HYDROXY: CPT

## 2018-05-29 PROCEDURE — 84443 ASSAY THYROID STIM HORMONE: CPT

## 2018-05-29 PROCEDURE — 36415 COLL VENOUS BLD VENIPUNCTURE: CPT

## 2018-05-29 PROCEDURE — G0463 HOSPITAL OUTPT CLINIC VISIT: HCPCS | Performed by: INTERNAL MEDICINE

## 2018-05-29 NOTE — TELEPHONE ENCOUNTER
Epi Beard. Janice Hercules was in the emergency room recently with a sodium of 125. I saw her in follow-up today. She is asymptomatic. We took the opportunity to draw the thyroid labs and vitamin D level that you requested.   Her thyroid function now was no

## 2018-05-29 NOTE — PROGRESS NOTES
Cortes Cannon is a 71year old female. HPI:   Patient presents with:   Follow - Up: F/U after hospital for severe headache , diarrhea , nausea - had too much water CT of brain , EKG , blood and urine tests       Patient is here for follow-up after havi Take 1 tablet (112 mcg total) by mouth before breakfast. Disp: 90 tablet Rfl: 0   Metoprolol Succinate ER 25 MG Oral Tablet 24 Hr Take 1 tablet (25 mg total) by mouth daily. Disp: 90 tablet Rfl: 3   Calcium 600 MG Oral Tab Take 600 mg by mouth daily.  Disp: denies chest pain on exertion or shortness of breath  GI:   denies abdominal pain or changes of bowels   :denies burning or frequency of urination.   NEURO:  denies headaches or dizziness    EXAM:   /74 (BP Location: Right arm, Patient Position: Sit Keith Hart MD  5/29/2018  11:15 AM

## 2018-05-29 NOTE — TELEPHONE ENCOUNTER
Jourdan Waite,     Thank you for your note and for letting me know. I agree that thyroid labs are normal, hence we can c/w same dose. I will have my office contact her. I will follow the Vitamin D also.        Regarding her sodium, if her labs are

## 2018-05-29 NOTE — TELEPHONE ENCOUNTER
Please have patient know that her electrolytes have now returned to normal.  Sodium count normal.  Thyroid function normal.  I will pass on results to Dr. Zaynab Peralta. We are still waiting for vitamin D level but I anticipate that will be good.   I was very

## 2018-05-29 NOTE — TELEPHONE ENCOUNTER
[address] [facility] desk, I added patient at 11 AM.  No need to call. ( note to self: Spoke with patient yesterday evening. She is feeling better. Just slight headache.   Will see today to repeat CBC and BMP taken into account the patient's sodium count of 125 recentl

## 2018-05-30 ENCOUNTER — TELEPHONE (OUTPATIENT)
Dept: ENDOCRINOLOGY CLINIC | Facility: CLINIC | Age: 73
End: 2018-05-30

## 2018-05-30 NOTE — TELEPHONE ENCOUNTER
Pt returned rn call . Attempt to transfer with no answer please call .  Pt will be available at 4pm thank you

## 2018-05-31 ENCOUNTER — TELEPHONE (OUTPATIENT)
Dept: ENDOCRINOLOGY CLINIC | Facility: CLINIC | Age: 73
End: 2018-05-31

## 2018-05-31 RX ORDER — LEVOTHYROXINE SODIUM 112 UG/1
112 TABLET ORAL
Qty: 90 TABLET | Refills: 0 | Status: SHIPPED | OUTPATIENT
Start: 2018-05-31 | End: 2018-10-02

## 2018-05-31 NOTE — TELEPHONE ENCOUNTER
Spoke with Brittney Almanza and advised her of note below that thyroid labs normal and to continue current dose. Pt verbalized understanding to c/w Levothyroxine 112 mcg daily. Pt requesting med refilled to [facility] (not [place]).  Pended order for provid

## 2018-05-31 NOTE — TELEPHONE ENCOUNTER
Vitamin D level is 25. Does she take any Vit d supplementation? If yes, please let me know what she takes. If not, then start OTC Vit D 2000 units daily. Thanks.

## 2018-05-31 NOTE — TELEPHONE ENCOUNTER
Spoke with patient and informed her of vitamin D results. She is not currently taking supplement.  Advised of recommendation to start vitamin D 2000 units daily

## 2018-07-26 ENCOUNTER — APPOINTMENT (OUTPATIENT)
Dept: CT IMAGING | Facility: HOSPITAL | Age: 73
End: 2018-07-26
Attending: EMERGENCY MEDICINE
Payer: MEDICARE

## 2018-07-26 ENCOUNTER — HOSPITAL ENCOUNTER (EMERGENCY)
Facility: HOSPITAL | Age: 73
Discharge: HOME | End: 2018-07-26
Attending: EMERGENCY MEDICINE
Payer: MEDICARE

## 2018-07-26 ENCOUNTER — TELEPHONE (OUTPATIENT)
Dept: INTERNAL MEDICINE CLINIC | Facility: CLINIC | Age: 73
End: 2018-07-26

## 2018-07-26 VITALS
DIASTOLIC BLOOD PRESSURE: 95 MMHG | HEIGHT: 63 IN | SYSTOLIC BLOOD PRESSURE: 134 MMHG | OXYGEN SATURATION: 98 % | TEMPERATURE: 99 F | RESPIRATION RATE: 16 BRPM | HEART RATE: 68 BPM | WEIGHT: 206 LBS | BODY MASS INDEX: 36.5 KG/M2

## 2018-07-26 DIAGNOSIS — N30.01 ACUTE CYSTITIS WITH HEMATURIA: Primary | ICD-10-CM

## 2018-07-26 LAB
ANION GAP SERPL CALC-SCNC: 7 MMOL/L (ref 0–18)
BASOPHILS # BLD: 0.1 K/UL (ref 0–0.2)
BASOPHILS NFR BLD: 1 %
BILIRUB UR QL: NEGATIVE
BUN SERPL-MCNC: 10 MG/DL (ref 8–20)
BUN/CREAT SERPL: 10.6 (ref 10–20)
CALCIUM SERPL-MCNC: 9 MG/DL (ref 8.5–10.5)
CHLORIDE SERPL-SCNC: 100 MMOL/L (ref 95–110)
CO2 SERPL-SCNC: 27 MMOL/L (ref 22–32)
COLOR UR: YELLOW
CREAT SERPL-MCNC: 0.94 MG/DL (ref 0.5–1.5)
EOSINOPHIL # BLD: 0.2 K/UL (ref 0–0.7)
EOSINOPHIL NFR BLD: 2 %
ERYTHROCYTE [DISTWIDTH] IN BLOOD BY AUTOMATED COUNT: 13.2 % (ref 11–15)
GLUCOSE SERPL-MCNC: 121 MG/DL (ref 70–99)
GLUCOSE UR-MCNC: NEGATIVE MG/DL
HCT VFR BLD AUTO: 39.6 % (ref 35–48)
HGB BLD-MCNC: 13 G/DL (ref 12–16)
KETONES UR-MCNC: NEGATIVE MG/DL
LYMPHOCYTES # BLD: 1.5 K/UL (ref 1–4)
LYMPHOCYTES NFR BLD: 12 %
MCH RBC QN AUTO: 28.1 PG (ref 27–32)
MCHC RBC AUTO-ENTMCNC: 32.9 G/DL (ref 32–37)
MCV RBC AUTO: 85.3 FL (ref 80–100)
MONOCYTES # BLD: 0.8 K/UL (ref 0–1)
MONOCYTES NFR BLD: 6 %
NEUTROPHILS # BLD AUTO: 10.4 K/UL (ref 1.8–7.7)
NEUTROPHILS NFR BLD: 80 %
NITRITE UR QL STRIP.AUTO: NEGATIVE
OSMOLALITY UR CALC.SUM OF ELEC: 278 MOSM/KG (ref 275–295)
PH UR: 5 [PH] (ref 5–8)
PLATELET # BLD AUTO: 292 K/UL (ref 140–400)
PMV BLD AUTO: 8 FL (ref 7.4–10.3)
POTASSIUM SERPL-SCNC: 4.2 MMOL/L (ref 3.3–5.1)
PROT UR-MCNC: 30 MG/DL
RBC # BLD AUTO: 4.64 M/UL (ref 3.7–5.4)
RBC #/AREA URNS AUTO: 1349 /HPF
SODIUM SERPL-SCNC: 134 MMOL/L (ref 136–144)
SP GR UR STRIP: 1.01 (ref 1–1.03)
UROBILINOGEN UR STRIP-ACNC: <2
VIT C UR-MCNC: NEGATIVE MG/DL
WBC # BLD AUTO: 13 K/UL (ref 4–11)
WBC #/AREA URNS AUTO: 626 /HPF

## 2018-07-26 PROCEDURE — 87088 URINE BACTERIA CULTURE: CPT | Performed by: EMERGENCY MEDICINE

## 2018-07-26 PROCEDURE — 36415 COLL VENOUS BLD VENIPUNCTURE: CPT

## 2018-07-26 PROCEDURE — 85025 COMPLETE CBC W/AUTO DIFF WBC: CPT | Performed by: EMERGENCY MEDICINE

## 2018-07-26 PROCEDURE — 99284 EMERGENCY DEPT VISIT MOD MDM: CPT

## 2018-07-26 PROCEDURE — 80048 BASIC METABOLIC PNL TOTAL CA: CPT | Performed by: EMERGENCY MEDICINE

## 2018-07-26 PROCEDURE — 87086 URINE CULTURE/COLONY COUNT: CPT | Performed by: EMERGENCY MEDICINE

## 2018-07-26 PROCEDURE — 87186 SC STD MICRODIL/AGAR DIL: CPT | Performed by: EMERGENCY MEDICINE

## 2018-07-26 PROCEDURE — 81001 URINALYSIS AUTO W/SCOPE: CPT | Performed by: EMERGENCY MEDICINE

## 2018-07-26 PROCEDURE — 74176 CT ABD & PELVIS W/O CONTRAST: CPT | Performed by: EMERGENCY MEDICINE

## 2018-07-26 RX ORDER — PHENAZOPYRIDINE HYDROCHLORIDE 100 MG/1
100 TABLET, FILM COATED ORAL DAILY
Qty: 3 TABLET | Refills: 0 | Status: SHIPPED | OUTPATIENT
Start: 2018-07-26 | End: 2018-07-29

## 2018-07-26 RX ORDER — AMOXICILLIN 875 MG/1
875 TABLET, COATED ORAL 2 TIMES DAILY
Qty: 20 TABLET | Refills: 0 | Status: SHIPPED | OUTPATIENT
Start: 2018-07-26 | End: 2018-08-05

## 2018-07-26 NOTE — ED PROVIDER NOTES
Patient Seen in: [facility] AND [facility] Emergency Department    History   Patient presents with:  Urinary Symptoms (urologic)    Stated Complaint: UTI SYMPTOMS / VAGINAL BLEEDING     HPI     64-year-old female with history of diverticulitis, prior hysterectom COMPLETE      Comment: Scan to media tab 12-  No date: HYSTERECTOMY  1/7/2016: KNEE SCOPE,MED/LAT MENISECTOMY Left      Comment: Procedure: ARTHROSCOPY KNEE LEFT;  Surgeon:                Escobar Gutierrez MD;  Location: [facility] SURGICAL                CE palpation. Musculoskeletal: Normal range of motion. No edema or tenderness. Neurological: Alert and oriented to person, place, and time. Skin: Skin is warm and dry. Nursing note and vitals reviewed.     Differential diagnosis includes UTI, renal coli [facility], CT PF RENAL STONE ABD/P WO CON, 4/01/2015, 7:16. [facility], [facility]. for [facility], [address] [place] [address], 6/24/2011, 9:01. [facility], 6/17/2013, 19:27.   [facility],  the gallbladder fossa. PANCREAS: Negative unenhanced appearance for fluid collection, ductal dilatation, or atrophy. SPLEEN: No enlargement.  There is reidentification of a well-circumscribed ovoid hypoattenuating splenic lesion with areas of peripheral ca cyst.  5. Stable hypoattenuating splenic lesion, likely an indolent structure such as a hemangioma. 6. Status post cholecystectomy without significant hepatobiliary dilatation. 7. Postoperative changes of hysterectomy are noted.   8. Lesser incidental fin

## 2018-07-26 NOTE — TELEPHONE ENCOUNTER
Pt woke up in the middle of the night with pain in the stomach. She thought she ate something bad, after a few hours it went away. When the pt finally got up and started moving around, she went shopping and got her nails done, and felt weak all day.  When p

## 2018-07-26 NOTE — TELEPHONE ENCOUNTER
Please advise - called patient who states last night she woke up with stomach pain which resolved. THis morning she started burning when urinating and just now di dnot urinate but wiped some blood.  Has also pain right flank , denies fever , has some chills

## 2018-07-26 NOTE — TELEPHONE ENCOUNTER
With the situation and her having to go to a wedding it may be cruz for her to be seen in urgent care. They can check her in do a urine and urine culture and start on antibiotics if needed.   Or if she does have pain in the right flank she may have a kidne

## 2018-07-27 NOTE — TELEPHONE ENCOUNTER
Pt called to schedule follow up  Was told to see Dr Sarah Velazquez on Monday, no appointments available  Can pt be added to Monday morning schedule?   Tasked to Dr Sarah Velazquez to advise  Pt can be reached at [phone]

## 2018-07-30 ENCOUNTER — TELEPHONE (OUTPATIENT)
Dept: INTERNAL MEDICINE CLINIC | Facility: CLINIC | Age: 73
End: 2018-07-30

## 2018-07-30 ENCOUNTER — LAB ENCOUNTER (OUTPATIENT)
Dept: LAB | Age: 73
End: 2018-07-30
Attending: INTERNAL MEDICINE
Payer: MEDICARE

## 2018-07-30 ENCOUNTER — OFFICE VISIT (OUTPATIENT)
Dept: INTERNAL MEDICINE CLINIC | Facility: CLINIC | Age: 73
End: 2018-07-30
Payer: COMMERCIAL

## 2018-07-30 VITALS
OXYGEN SATURATION: 98 % | HEART RATE: 64 BPM | DIASTOLIC BLOOD PRESSURE: 74 MMHG | HEIGHT: 63 IN | WEIGHT: 206 LBS | TEMPERATURE: 98 F | BODY MASS INDEX: 36.5 KG/M2 | SYSTOLIC BLOOD PRESSURE: 126 MMHG

## 2018-07-30 DIAGNOSIS — K76.0 FATTY LIVER: Primary | ICD-10-CM

## 2018-07-30 DIAGNOSIS — G89.29 CHRONIC MIDLINE LOW BACK PAIN WITHOUT SCIATICA: ICD-10-CM

## 2018-07-30 DIAGNOSIS — M54.50 CHRONIC MIDLINE LOW BACK PAIN WITHOUT SCIATICA: ICD-10-CM

## 2018-07-30 DIAGNOSIS — R10.2 PELVIC PAIN: ICD-10-CM

## 2018-07-30 DIAGNOSIS — R39.198 ABNORMAL URINATION: Primary | ICD-10-CM

## 2018-07-30 DIAGNOSIS — R39.198 ABNORMAL URINATION: ICD-10-CM

## 2018-07-30 DIAGNOSIS — K76.0 FATTY LIVER: ICD-10-CM

## 2018-07-30 LAB
ALBUMIN SERPL BCP-MCNC: 4 G/DL (ref 3.5–4.8)
ALBUMIN/GLOB SERPL: 1.3 {RATIO} (ref 1–2)
ALP SERPL-CCNC: 65 U/L (ref 32–100)
ALT SERPL-CCNC: 19 U/L (ref 14–54)
ANION GAP SERPL CALC-SCNC: 9 MMOL/L (ref 0–18)
AST SERPL-CCNC: 23 U/L (ref 15–41)
BASOPHILS # BLD: 0.2 K/UL (ref 0–0.2)
BASOPHILS NFR BLD: 2 %
BILIRUB SERPL-MCNC: 0.5 MG/DL (ref 0.3–1.2)
BILIRUB UR QL: NEGATIVE
BUN SERPL-MCNC: 12 MG/DL (ref 8–20)
BUN/CREAT SERPL: 13.5 (ref 10–20)
CALCIUM SERPL-MCNC: 9.2 MG/DL (ref 8.5–10.5)
CHLORIDE SERPL-SCNC: 99 MMOL/L (ref 95–110)
CLARITY UR: CLEAR
CO2 SERPL-SCNC: 27 MMOL/L (ref 22–32)
COLOR UR: YELLOW
CREAT SERPL-MCNC: 0.89 MG/DL (ref 0.5–1.5)
EOSINOPHIL # BLD: 0.2 K/UL (ref 0–0.7)
EOSINOPHIL NFR BLD: 3 %
ERYTHROCYTE [DISTWIDTH] IN BLOOD BY AUTOMATED COUNT: 13.1 % (ref 11–15)
FERRITIN SERPL IA-MCNC: 98 NG/ML (ref 11–307)
GLOBULIN PLAS-MCNC: 3.1 G/DL (ref 2.5–3.7)
GLUCOSE SERPL-MCNC: 91 MG/DL (ref 70–99)
GLUCOSE UR-MCNC: NEGATIVE MG/DL
HCT VFR BLD AUTO: 42.1 % (ref 35–48)
HGB BLD-MCNC: 13.9 G/DL (ref 12–16)
HGB UR QL STRIP.AUTO: NEGATIVE
IRON SATN MFR SERPL: 21 % (ref 15–50)
IRON SERPL-MCNC: 71 MCG/DL (ref 28–170)
KETONES UR-MCNC: NEGATIVE MG/DL
LEUKOCYTE ESTERASE UR QL STRIP.AUTO: NEGATIVE
LYMPHOCYTES # BLD: 2.1 K/UL (ref 1–4)
LYMPHOCYTES NFR BLD: 22 %
MCH RBC QN AUTO: 28.4 PG (ref 27–32)
MCHC RBC AUTO-ENTMCNC: 32.9 G/DL (ref 32–37)
MCV RBC AUTO: 86.3 FL (ref 80–100)
MONOCYTES # BLD: 0.7 K/UL (ref 0–1)
MONOCYTES NFR BLD: 8 %
NEUTROPHILS # BLD AUTO: 6.1 K/UL (ref 1.8–7.7)
NEUTROPHILS NFR BLD: 65 %
NITRITE UR QL STRIP.AUTO: NEGATIVE
OSMOLALITY UR CALC.SUM OF ELEC: 279 MOSM/KG (ref 275–295)
PATIENT FASTING: NO
PH UR: 6 [PH] (ref 5–8)
PLATELET # BLD AUTO: 314 K/UL (ref 140–400)
PMV BLD AUTO: 8.7 FL (ref 7.4–10.3)
POTASSIUM SERPL-SCNC: 4.7 MMOL/L (ref 3.3–5.1)
PROT SERPL-MCNC: 7.1 G/DL (ref 5.9–8.4)
PROT UR-MCNC: NEGATIVE MG/DL
RBC # BLD AUTO: 4.88 M/UL (ref 3.7–5.4)
SODIUM SERPL-SCNC: 135 MMOL/L (ref 136–144)
SP GR UR STRIP: 1.01 (ref 1–1.03)
TIBC SERPL-MCNC: 341 MCG/DL (ref 228–428)
TRANSFERRIN SERPL-MCNC: 258 MG/DL (ref 192–382)
UROBILINOGEN UR STRIP-ACNC: <2
VIT C UR-MCNC: NEGATIVE MG/DL
WBC # BLD AUTO: 9.4 K/UL (ref 4–11)

## 2018-07-30 PROCEDURE — 86038 ANTINUCLEAR ANTIBODIES: CPT

## 2018-07-30 PROCEDURE — 83540 ASSAY OF IRON: CPT

## 2018-07-30 PROCEDURE — G0463 HOSPITAL OUTPT CLINIC VISIT: HCPCS | Performed by: INTERNAL MEDICINE

## 2018-07-30 PROCEDURE — 99215 OFFICE O/P EST HI 40 MIN: CPT | Performed by: INTERNAL MEDICINE

## 2018-07-30 PROCEDURE — 82728 ASSAY OF FERRITIN: CPT

## 2018-07-30 PROCEDURE — 80500 HEPATITIS A B + C PROFILE: CPT

## 2018-07-30 PROCEDURE — 81003 URINALYSIS AUTO W/O SCOPE: CPT

## 2018-07-30 PROCEDURE — 84466 ASSAY OF TRANSFERRIN: CPT

## 2018-07-30 PROCEDURE — 86708 HEPATITIS A ANTIBODY: CPT

## 2018-07-30 PROCEDURE — 87086 URINE CULTURE/COLONY COUNT: CPT

## 2018-07-30 PROCEDURE — 86803 HEPATITIS C AB TEST: CPT

## 2018-07-30 PROCEDURE — 87340 HEPATITIS B SURFACE AG IA: CPT

## 2018-07-30 PROCEDURE — 85025 COMPLETE CBC W/AUTO DIFF WBC: CPT

## 2018-07-30 PROCEDURE — 86706 HEP B SURFACE ANTIBODY: CPT

## 2018-07-30 PROCEDURE — 86704 HEP B CORE ANTIBODY TOTAL: CPT

## 2018-07-30 PROCEDURE — 36415 COLL VENOUS BLD VENIPUNCTURE: CPT

## 2018-07-30 PROCEDURE — 80053 COMPREHEN METABOLIC PANEL: CPT

## 2018-07-30 NOTE — TELEPHONE ENCOUNTER
Discussed with patient. She had a reaction to the nitrofurantoin as listed. She did feel her \"throat closing\" but does not feel that now. Mostly now dry mouth remains. She did have some shortness of breath but not now. Palpitations have resolved.   R

## 2018-07-30 NOTE — TELEPHONE ENCOUNTER
Please advise - patient called she took one tablet today nitrofurantoin at 2pm, got palpitations , dizziness, dry mouth, vision little blurry, right ear feels like she losing some hearing , in beginning some SOB that passed, feels like slurring speech , de

## 2018-07-30 NOTE — TELEPHONE ENCOUNTER
Please think patient for calling this in. This is exactly what I would like her to take. Just for the next 5 days.

## 2018-07-30 NOTE — TELEPHONE ENCOUNTER
Returning message to Dr. Shiv Simmons, the medication is Nitrofurantoin - Mono 100mg capsules 2x a day.     Patient can be reached at [phone]  Routed to Dr. Shiv Simmons

## 2018-07-30 NOTE — PROGRESS NOTES
HPI:   Hari Cancino is a 71year old female presents with the following problems. Patient was in the emergency room Thursday. She had called indicating that she woke up in the middle the night with pain in the stomach.   She thought she ate somethi ampicillin. After discharge from the emergency room and after some amoxicillin her  symptoms resolved. She now has a little bit of \"tinge\" when she passes urine.   For the most part her lower abdominal discomfort and back his comfort is better but i Date   • Acute meniscal tear of left knee 1/5/2016    MRI done by [facility] shows small joint effusion and moderate-sized Baker's cyst and complex medial meniscal tear.   MRI done December 9, 2015    • Baker's cyst 1/5/2016    Shah cyst noted on left knee History   Problem Relation Age of Onset   • Hypertension Mother    • Thyroid Disorder Mother      thyroid problems   • Kidney Disease Mother      Chronic kidney disease   • Cancer Mother      Cancer-breast   • Breast Cancer Mother 80   • Heart Disease Fath Gold Auto Resulted    -RAINBOW DRAW LAVENDER TALL (BNP)   Result Value Ref Range   Hold Lavender Auto Resulted    -URINE CULTURE, ROUTINE   Result Value Ref Range   Urine Culture 10,000 - 50,000 CFU/ML Escherichia coli (A)    *Culture results found, see re exudate or erythema  EYES:  PERRL . conjunctiva clear. NECK:  supple, no adenopathy,   LUNGS:  clear to auscultation. Effort normal  CARDIO:  RRR without murmur. S1 and S2 normal.  GI: Bowel sounds present. No organomegaly.   No tenderness to palpating t message. She feels better. Now just dry mouth remaining. She will drink fluids. Will call tomorrow with her updated labs. Patient knows to emergency room if she feels worse. She spoke clearly on the phone.   She does not seem to be in any respiratory

## 2018-07-30 NOTE — TELEPHONE ENCOUNTER
Will order repeat CBC in preparation of patient's office visit for follow-up of her abnormal urinalysis. Also CAT scan showed fatty liver. Will go ahead and add iron levels, TEDDY and hepatitis ABC serologies.

## 2018-07-31 LAB
HAV AB SER QL IA: NONREACTIVE
HBV CORE AB SERPL QL IA: NONREACTIVE
HBV SURFACE AB SER-ACNC: <3.1 MIU/ML (ref ?–10)
HBV SURFACE AG SERPL QL IA: NONREACTIVE
HBV SURFACE AG SERPL QL IA: NONREACTIVE
HCV AB SERPL QL IA: NONREACTIVE

## 2018-07-31 NOTE — TELEPHONE ENCOUNTER
Please notify patient that so far her labs and urinalysis look good. We are still waiting for the urine culture. We will call her with results of urine culture which I am hoping will be good without any signs of infection.   I am hoping she is feeling bet

## 2018-07-31 NOTE — TELEPHONE ENCOUNTER
Relayed MD message to pt, Pt states she is feeling a little better all vitals are good. Pt states she just feels exhausted. Pt states she did not pass any stones, feels it was just pain from the UTI.  Advised pt to see urologist, pt verbalized understanding

## 2018-08-02 LAB — NUCLEAR IGG TITR SER IF: NEGATIVE {TITER}

## 2018-08-06 ENCOUNTER — APPOINTMENT (OUTPATIENT)
Dept: CT IMAGING | Facility: HOSPITAL | Age: 73
DRG: 392 | End: 2018-08-06
Attending: EMERGENCY MEDICINE
Payer: MEDICARE

## 2018-08-06 ENCOUNTER — HOSPITAL ENCOUNTER (INPATIENT)
Facility: HOSPITAL | Age: 73
LOS: 2 days | Discharge: HOME | DRG: 392 | End: 2018-08-08
Attending: EMERGENCY MEDICINE | Admitting: HOSPITALIST
Payer: MEDICARE

## 2018-08-06 ENCOUNTER — TELEPHONE (OUTPATIENT)
Dept: INTERNAL MEDICINE CLINIC | Facility: CLINIC | Age: 73
End: 2018-08-06

## 2018-08-06 DIAGNOSIS — E87.1 HYPONATREMIA: ICD-10-CM

## 2018-08-06 DIAGNOSIS — K57.92 ACUTE DIVERTICULITIS: Primary | ICD-10-CM

## 2018-08-06 LAB
ANION GAP SERPL CALC-SCNC: 8 MMOL/L (ref 0–18)
BASOPHILS # BLD: 0.2 K/UL (ref 0–0.2)
BASOPHILS NFR BLD: 1 %
BILIRUB UR QL: NEGATIVE
BUN SERPL-MCNC: 8 MG/DL (ref 8–20)
BUN/CREAT SERPL: 10.1 (ref 10–20)
CALCIUM SERPL-MCNC: 8.5 MG/DL (ref 8.5–10.5)
CHLORIDE SERPL-SCNC: 96 MMOL/L (ref 95–110)
CLARITY UR: CLEAR
CO2 SERPL-SCNC: 24 MMOL/L (ref 22–32)
COLOR UR: YELLOW
CREAT SERPL-MCNC: 0.79 MG/DL (ref 0.5–1.5)
EOSINOPHIL # BLD: 0.1 K/UL (ref 0–0.7)
EOSINOPHIL NFR BLD: 1 %
ERYTHROCYTE [DISTWIDTH] IN BLOOD BY AUTOMATED COUNT: 13.2 % (ref 11–15)
GLUCOSE SERPL-MCNC: 106 MG/DL (ref 70–99)
GLUCOSE UR-MCNC: NEGATIVE MG/DL
HCT VFR BLD AUTO: 37.9 % (ref 35–48)
HGB BLD-MCNC: 12.5 G/DL (ref 12–16)
LEUKOCYTE ESTERASE UR QL STRIP.AUTO: NEGATIVE
LYMPHOCYTES # BLD: 1.7 K/UL (ref 1–4)
LYMPHOCYTES NFR BLD: 11 %
MCH RBC QN AUTO: 28.1 PG (ref 27–32)
MCHC RBC AUTO-ENTMCNC: 32.9 G/DL (ref 32–37)
MCV RBC AUTO: 85.5 FL (ref 80–100)
MONOCYTES # BLD: 1 K/UL (ref 0–1)
MONOCYTES NFR BLD: 7 %
NEUTROPHILS # BLD AUTO: 12.2 K/UL (ref 1.8–7.7)
NEUTROPHILS NFR BLD: 81 %
NITRITE UR QL STRIP.AUTO: NEGATIVE
OSMOLALITY UR CALC.SUM OF ELEC: 265 MOSM/KG (ref 275–295)
PH UR: 5 [PH] (ref 5–8)
PLATELET # BLD AUTO: 246 K/UL (ref 140–400)
PMV BLD AUTO: 8.1 FL (ref 7.4–10.3)
POTASSIUM SERPL-SCNC: 3.6 MMOL/L (ref 3.3–5.1)
PROT UR-MCNC: NEGATIVE MG/DL
RBC # BLD AUTO: 4.44 M/UL (ref 3.7–5.4)
RBC #/AREA URNS AUTO: 0 /HPF
SODIUM SERPL-SCNC: 128 MMOL/L (ref 136–144)
SP GR UR STRIP: 1.01 (ref 1–1.03)
UROBILINOGEN UR STRIP-ACNC: <2
VIT C UR-MCNC: NEGATIVE MG/DL
WBC # BLD AUTO: 15.2 K/UL (ref 4–11)
WBC #/AREA URNS AUTO: <1 /HPF

## 2018-08-06 PROCEDURE — 74176 CT ABD & PELVIS W/O CONTRAST: CPT | Performed by: EMERGENCY MEDICINE

## 2018-08-06 PROCEDURE — 99223 1ST HOSP IP/OBS HIGH 75: CPT | Performed by: HOSPITALIST

## 2018-08-06 RX ORDER — CIPROFLOXACIN 2 MG/ML
400 INJECTION, SOLUTION INTRAVENOUS EVERY 12 HOURS
Status: DISCONTINUED | OUTPATIENT
Start: 2018-08-06 | End: 2018-08-06

## 2018-08-06 RX ORDER — SODIUM CHLORIDE 9 MG/ML
INJECTION, SOLUTION INTRAVENOUS CONTINUOUS
Status: ACTIVE | OUTPATIENT
Start: 2018-08-06 | End: 2018-08-06

## 2018-08-06 RX ORDER — ONDANSETRON 2 MG/ML
4 INJECTION INTRAMUSCULAR; INTRAVENOUS EVERY 6 HOURS PRN
Status: DISCONTINUED | OUTPATIENT
Start: 2018-08-06 | End: 2018-08-08

## 2018-08-06 RX ORDER — METRONIDAZOLE 500 MG/100ML
500 INJECTION, SOLUTION INTRAVENOUS ONCE
Status: DISCONTINUED | OUTPATIENT
Start: 2018-08-06 | End: 2018-08-06

## 2018-08-06 RX ORDER — MORPHINE SULFATE 4 MG/ML
4 INJECTION, SOLUTION INTRAMUSCULAR; INTRAVENOUS EVERY 2 HOUR PRN
Status: DISCONTINUED | OUTPATIENT
Start: 2018-08-06 | End: 2018-08-08

## 2018-08-06 RX ORDER — DEXTROSE, SODIUM CHLORIDE, AND POTASSIUM CHLORIDE 5; .9; .15 G/100ML; G/100ML; G/100ML
INJECTION INTRAVENOUS CONTINUOUS
Status: DISCONTINUED | OUTPATIENT
Start: 2018-08-06 | End: 2018-08-07

## 2018-08-06 RX ORDER — DICYCLOMINE HCL 20 MG
20 TABLET ORAL ONCE
Status: DISCONTINUED | OUTPATIENT
Start: 2018-08-06 | End: 2018-08-06

## 2018-08-06 RX ORDER — MORPHINE SULFATE 4 MG/ML
1 INJECTION, SOLUTION INTRAMUSCULAR; INTRAVENOUS EVERY 2 HOUR PRN
Status: DISCONTINUED | OUTPATIENT
Start: 2018-08-06 | End: 2018-08-08

## 2018-08-06 RX ORDER — SODIUM CHLORIDE 0.9 % (FLUSH) 0.9 %
3 SYRINGE (ML) INJECTION AS NEEDED
Status: DISCONTINUED | OUTPATIENT
Start: 2018-08-06 | End: 2018-08-08

## 2018-08-06 RX ORDER — MORPHINE SULFATE 4 MG/ML
2 INJECTION, SOLUTION INTRAMUSCULAR; INTRAVENOUS EVERY 2 HOUR PRN
Status: DISCONTINUED | OUTPATIENT
Start: 2018-08-06 | End: 2018-08-08

## 2018-08-06 NOTE — CONSULTS
[facility] HOSP - [facility]    Report of Consultation    Perri Cerrato Patient Status:  Emergency    1945 MRN [id number]   Location [address] Attending Rodney Gates MD   [facility] Day # 0 PCP Jackie Vargas MD     Da ELECTROCARDIOGRAM, COMPLETE      Comment: Scan to media tab 03- 12-: ELECTROCARDIOGRAM, COMPLETE      Comment: Scan to media tab 12- 12-: ELECTROCARDIOGRAM, COMPLETE      Comment: Scan to media tab 12-  No date: HYSTERE Comment:Other reaction(s): LEVOFLOXACIN             Other reaction(s): LEVOFLOXACIN  Methylprednisolone          Comment:Other reaction(s):  Altered Heart Rate             Other reaction(s): METHYLPREDNISOLONE             Other reaction(s): METHYLPREDNI limits. Spleen is not palpable. Extremities:  No lower extremity edema noted. Without clubbing or cyanosis. Skin: Normal texture and turgor. Lymphatic:  No palpable cervical lymphadenopathy. Neurologic: Cranial nerves are grossly intact.   Motor s

## 2018-08-06 NOTE — ED NOTES
Ab pain since Friday night radiating to back.  Noted some mucus last night from rectum  Recent UTI was treated here  No sob/chest pain/ n/v.

## 2018-08-06 NOTE — TELEPHONE ENCOUNTER
Please notify patient that it should not be from the antibiotic since she did not get very many doses.   She was in the emergency room with abdominal discomfort and she had grossly abnormal urinalysis that was felt to possibly be a UTI but also appears to b

## 2018-08-06 NOTE — ED PROVIDER NOTES
Patient Seen in: [facility] AND [facility] Emergency Department    History   Patient presents with:  Abdomen/Flank Pain (GI/)    Stated Complaint:  abd cramping    HPI    70 yo F with PMH GERD, HTN, diverticulosis presenting for evaluation of several days of low COMPLETE      Comment: Scan to media tab 12-  No date: HYSTERECTOMY  1/7/2016: KNEE SCOPE,MED/LAT MENISECTOMY Left      Comment: Procedure: ARTHROSCOPY KNEE LEFT;  Surgeon:                Karis Machuca MD;  Location: [facility] SURGICAL                CE Negative for chest pain and palpitations. Gastrointestinal: Negative for vomiting; (+) diarrhea and abdominal pain. Genitourinary: Negative for dysuria and hematuria. Positive for stated complaint:   Other systems are as noted in HPI.   Constitution -----------         ------                     CBC W/ DIFFERENTIAL[[id number]]          Abnormal            Final result                 Please view results for these tests on the individual orders.    RAINBOW DRAW BLUE   RAINBOW DRAW LAVENDER   RAINBOW AORTA/VASCULAR:   Mild calcific atherosclerosis. No aneurysm. RETROPERITONEUM: No mass or enlarged adenopathy. BOWEL/MESENTERY: Colonic diverticulosis superimposed acute diverticulitis involving the mid sigmoid colon.  Bowel wall thickening, mild luminal images of the abdomen and pelvis were obtained without intravenous contrast material. No oral contrast was ingested. Automated exposure control for dose reduction was used. Adjustment of the mA and/or kV was done based on the patient's size.  Iterative familia present. ADRENALS:   No defined mass or abnormal enlargement. GI/MESENTERY:  There is no evidence of bowel obstruction. A normal caliber appendix is seen without inflammatory manifestations. Scattered colonic diverticula are present in the sigmoid colon. 7/26/2018 at 19:51          MDM     DIFFERENTIAL DIAGNOSIS: After history and physical exam differential diagnosis includes but is not limited to diverticulitis, IBD, electrolyte derangement, UTI, infectious diarrhea.     Pulse ox: 97%:Normal on Ra, as inte

## 2018-08-06 NOTE — TELEPHONE ENCOUNTER
Pt saw Dr Steffanie Lira last week, Monday, had blood work done and results came back good. Pt has cramping during the night, no fever or chills or anything else.  Pt states she has mucus coming out and is wondering if this is from the antibiotics she was prescribed or

## 2018-08-06 NOTE — TELEPHONE ENCOUNTER
To Dr. Amelia Crowe - cramping is in lower abo - \"right below belly buttom\" - since Friday night. Wakes pt up at night. Pt states she has BM daily. Yesterday had BM accompanied by mucus and diarrhea x1 yesterday.   Just had mucus today, no stool - feels like

## 2018-08-07 LAB
ANION GAP SERPL CALC-SCNC: 6 MMOL/L (ref 0–18)
BASOPHILS # BLD: 0 K/UL (ref 0–0.2)
BASOPHILS NFR BLD: 0 %
BUN SERPL-MCNC: 6 MG/DL (ref 8–20)
BUN/CREAT SERPL: 7.5 (ref 10–20)
CALCIUM SERPL-MCNC: 8.5 MG/DL (ref 8.5–10.5)
CHLORIDE SERPL-SCNC: 100 MMOL/L (ref 95–110)
CO2 SERPL-SCNC: 27 MMOL/L (ref 22–32)
CREAT SERPL-MCNC: 0.8 MG/DL (ref 0.5–1.5)
EOSINOPHIL # BLD: 0.2 K/UL (ref 0–0.7)
EOSINOPHIL NFR BLD: 2 %
ERYTHROCYTE [DISTWIDTH] IN BLOOD BY AUTOMATED COUNT: 13.5 % (ref 11–15)
GLUCOSE SERPL-MCNC: 98 MG/DL (ref 70–99)
HCT VFR BLD AUTO: 38.4 % (ref 35–48)
HGB BLD-MCNC: 12.7 G/DL (ref 12–16)
LYMPHOCYTES # BLD: 1.8 K/UL (ref 1–4)
LYMPHOCYTES NFR BLD: 17 %
MCH RBC QN AUTO: 28.4 PG (ref 27–32)
MCHC RBC AUTO-ENTMCNC: 33.1 G/DL (ref 32–37)
MCV RBC AUTO: 85.7 FL (ref 80–100)
MONOCYTES # BLD: 0.9 K/UL (ref 0–1)
MONOCYTES NFR BLD: 8 %
NEUTROPHILS # BLD AUTO: 7.8 K/UL (ref 1.8–7.7)
NEUTROPHILS NFR BLD: 73 %
OSMOLALITY UR CALC.SUM OF ELEC: 274 MOSM/KG (ref 275–295)
PLATELET # BLD AUTO: 232 K/UL (ref 140–400)
PMV BLD AUTO: 8.3 FL (ref 7.4–10.3)
POTASSIUM SERPL-SCNC: 3.7 MMOL/L (ref 3.3–5.1)
RBC # BLD AUTO: 4.48 M/UL (ref 3.7–5.4)
SODIUM SERPL-SCNC: 133 MMOL/L (ref 136–144)
WBC # BLD AUTO: 10.8 K/UL (ref 4–11)

## 2018-08-07 PROCEDURE — 99233 SBSQ HOSP IP/OBS HIGH 50: CPT | Performed by: HOSPITALIST

## 2018-08-07 RX ORDER — SODIUM CHLORIDE 9 MG/ML
INJECTION, SOLUTION INTRAVENOUS CONTINUOUS
Status: DISCONTINUED | OUTPATIENT
Start: 2018-08-07 | End: 2018-08-08

## 2018-08-07 RX ORDER — DIPHENHYDRAMINE HYDROCHLORIDE 50 MG/ML
25 INJECTION INTRAMUSCULAR; INTRAVENOUS EVERY 4 HOURS PRN
Status: DISCONTINUED | OUTPATIENT
Start: 2018-08-07 | End: 2018-08-08

## 2018-08-07 RX ORDER — POTASSIUM CHLORIDE 20 MEQ/1
40 TABLET, EXTENDED RELEASE ORAL ONCE
Status: COMPLETED | OUTPATIENT
Start: 2018-08-07 | End: 2018-08-07

## 2018-08-07 RX ORDER — POTASSIUM CHLORIDE 14.9 MG/ML
20 INJECTION INTRAVENOUS ONCE
Status: DISCONTINUED | OUTPATIENT
Start: 2018-08-07 | End: 2018-08-08

## 2018-08-07 RX ORDER — LEVOTHYROXINE SODIUM 112 UG/1
112 TABLET ORAL
Status: DISCONTINUED | OUTPATIENT
Start: 2018-08-07 | End: 2018-08-08

## 2018-08-07 RX ORDER — MECLIZINE HCL 12.5 MG/1
12.5 TABLET ORAL DAILY
Status: DISCONTINUED | OUTPATIENT
Start: 2018-08-07 | End: 2018-08-08

## 2018-08-07 NOTE — PLAN OF CARE
Problem: Patient/Family Goals  Goal: Patient/Family Long Term Goal  Patient's Long Term Goal: To go home     Interventions:  - Abx as ordered  - Activity as tolerated  - Advance diet as ordered.    - See additional Care Plan goals for specific interventions

## 2018-08-07 NOTE — CONSULTS
REFERRING PHYSICIAN: Dr. Ding ref. provider found    HPI:         Thank you very much for requesting me to see the patient. As you know, Kat Morales is a 71year old female who presented to ER yest with progressive lower abd pain x 3 d PTA.  States h COLONOSCOPY  2006: DEBRIDEMENT OF NAIL(S), 1-5      Comment: debridement of the med border of the left                great toenail  03-: ELECTROCARDIOGRAM, COMPLETE      Comment: Scan to media tab 03- 12-: ELECTROCARDIOGRAM, COMPLETE Comment:Dizziness,SOB ,blurry vision  Adhesive Tape           SWELLING  Amoxicillin                 Comment:Other reaction(s): Hives             Other reaction(s): AMOXICILLIN  Celecoxib                   Comment:Other reaction(s): CELECOXIB  Clindamycin RRR, nl s1 and s2. No murmers appreciated. GI: Soft, NT, ND, normal BS. NO HSM, masses, hernias or bruits.   EXTREMITIES: no cyanosis, clubbing or edema    ___________________________________________________________    ASSESSMENT: In summary this is a 71 y

## 2018-08-07 NOTE — PROGRESS NOTES
[facility]    Progress Note    Denise Bergman Patient Status:  Inpatient    1945 MRN [id number]   Location [facility] 5SW/SE Attending Thom Baires, [address] [facility] Day # 1 PCP Lashon Vincent MD       Subjective:    Esteban Russo 08/07/2018    (L) 08/07/2018   K 3.7 08/07/2018    08/07/2018   CO2 27 08/07/2018   GLU 98 08/07/2018   CA 8.5 08/07/2018   ALB 4.0 07/30/2018   ALKPHO 65 07/30/2018   BILT 0.5 07/30/2018   TP 7.1 07/30/2018   AST 23 07/30/2018   ALT 19 07/30/2 answered.          5/9/9291     **Certification      PHYSICIAN Certification of Need for Inpatient Hospitalization - Initial Certification    Patient will require inpatient services that will reasonably be expected to span two midnight's based on the clinic

## 2018-08-07 NOTE — H&P
Psychiatric    PATIENT'S NAME: Black Kaleigh   ATTENDING PHYSICIAN: Charbel Cooper MD   PATIENT ACCOUNT#:   [id number]    LOCATION:  [address] [place] [address] RECORD #:   [id number]       YOB: 1945  ADMISSION DATE:       08/06/2 or drug use. Lives with her family. Usually independent for basic activities of daily living. REVIEW OF SYSTEMS:  Left lower quadrant abdominal pain associated with intermittent constipation for the last 3 to 4 days. No fever or chills.   Other 12-poi

## 2018-08-07 NOTE — PROGRESS NOTES
Ronna Rocha is a 71-year-old female recovering from acute diverticulitis. She reports reduced pain that now only occurs when pressure is applied to her left abdomen/flank. She reports constipation with her last bowel movement occurring on Sunday.  She re

## 2018-08-07 NOTE — PROGRESS NOTES
CT reviewed by Dr Douglas Ayers:   Small pericolonic fluid collection/phlegmon left iliac fossa not amenable to percutaneous drain placement.

## 2018-08-07 NOTE — PAYOR COMM NOTE
--------------  ADMISSION REVIEW     Payor: [address] [place], [place] [address]Moira Tipton #:  [id number]  Authorization Number: N/A    Admit date: 8/6/18  Admit time: 1838       Admitting Physician: Leo Triana MD  Attending Physician:  Yisel Lin DO 72 yo F with PMH GERD, HTN, diverticulosis presenting for evaluation of several days of lower abdominal cramping associated with mucoid stool. No fevers/chils, no sick contacts/recent travel.  S/p amoxicillin for UTI with transition to macrobid after urine Comment: Procedure: ARTHROSCOPY KNEE LEFT;  Surgeon:                Malena Buerger, MD;  Location: [address] [place] [address] 404  No date:   No date: OTHER      Comment: thumb surgery  Carpal Tunnel both hands: OTHER SURGICAL HISTORY Genitourinary: Negative for dysuria and hematuria. Positive for stated complaint:   Other systems are as noted in HPI. Constitutional and vital signs reviewed. All other systems reviewed and negative except as noted above.     [facility] elements review CBC W/ DIFFERENTIAL[[id number]]          Abnormal            Final result                 Please view results for these tests on the individual orders.    RAINBOW DRAW BLUE   RAINBOW DRAW LAVENDER   RAINBOW DRAW DARK GREEN   RAINBOW DRAW LIGHT GREEN   [address] PROCEDURE:   CT ABDOMEN + PELVIS WITHOUT CONTRAST (CPT=74176)  COMPARISON:   [place], [place] ABDOMEN PELVIS WO CON, 4/06/2016, 13:16.   [facility], CT ABDOMEN + PELVIS KIDNEYSTONE 2D RNDR (NO IV NO ORAL) (CPT= umbilical and inguinal hernias containing fat. URINARY BLADDER: No visible focal wall thickening, lesion or calculus. PELVIC NODES: No enlarged mass or adenopathy. PELVIC ORGANS: No visible mass. Pelvic organs appropriate for patient age.   BONES:   S. PROCEDURE:   CT ABDOMEN + PELVIS KIDNEYSTONE 2D RNDR (NO IV NO ORAL) (CMC=[id number])  COMPARISON: [facility], CT PF RENAL STONE ABD/P WO CON, 4/01/2015, 7:16. [facility], [facility], [address] [place] [address], 6/24/2011, 9:01.   Roel hepatic lesion measuring 1.1 x 1.5 cm. BILIARY: The gallbladder is surgically absent with cholecystectomy clips in the gallbladder fossa. PANCREAS: Negative unenhanced appearance for fluid collection, ductal dilatation, or atrophy. SPLEEN: No enlargement. CONCLUSION:  1. No evidence of nephroureterolithiasis or obstructive uropathy. 2. No acute intra-abdominal process is identified by noncontrast CT technique. The etiology of the patient's symptoms is unclear from this study.   3. Uncomplicated distal colon Filed:  8/6/2018  8:34 PM Status:  Unsigned Transcription    :  Aminata Corbin MD (Physician)         Alfredo Mendez    PATIENT'S NAME: Kristen Mullins   ATTENDING PHYSICIAN: Mellissa Jung MD   PATIENT ACCOUNT#:   [id number]    LOCATION:  5 FAMILY HISTORY:  Mother had hypertension and breast cancer. SOCIAL HISTORY:  Ex-tobacco user. No current tobacco, alcohol, or drug use. Lives with her family. Usually independent for basic activities of daily living.     REVIEW OF SYSTEMS:  Left low Date Action Dose Route User    8/6/2018 2111 New Bag (none) Intravenous Qiana Carroll, RN      Piperacillin Sod-Tazobactam So (ZOSYN) 3.375 g in dextrose 5 % 100 mL ADD-vantage     Date Action Dose Route User    8/7/2018 0730 New Bag 3.375 g Intravenou 8/5/13 -- colonoscopy -- Dr. Hemalatha Teague -- \"PREOPERATIVE DIAGNOSIS:  History of recurrent diverticulitis - rule out coexistent pathology. POSTOPERATIVE DIAGNOSES: 1. Internal hemorrhoids. 2. Mild uncomplicated diverticulosis of the sigmoid colon. from Hallux valgus, left heel  No date: TONSILLECTOMY  No date: TOTAL ABDOM HYSTERECTOMY  Smoking status: Former Smoker                                                              Packs/day: 0.00      Years: 0.00         Types: Cigarettes  Sm Comment:Other reaction(s): METRONIDAZOLE HCL  Omeprazole                  Comment:Other reaction(s): headaches             Other reaction(s): OMEPRAZOLE             Other reaction(s): Dizzy             Other reaction(s): headaches             Other react 2.) diet recommendations per surgical colleagues. 3.) recommend repeat colonoscopy after complete recovery in 8 wks           The patient indicates understanding of these issues and agrees to the plan. Thank you! Vidya CELIS • Displacement of lumbar intervertebral disc without myelopathy 4/13/2015   • Diverticulitis 2013   • Esophageal reflux     • Fatty liver     • Foreign body in foot, left 2010     Foreign body left heel, Hallux valgus, sharp debridement, removal of a piece     CAD, father passed away in Given, he had what sounds like sudden death   • Cancer Brother         Cancer-prostate       reports that she has quit smoking. Her smoking use included Cigarettes.  She has never used smokeless tobacco. She reports that she Physical Exam:  Blood pressure 115/68, pulse 70, temperature 98.6 °F (37 °C), temperature source Oral, resp. rate 16, height 5' 3\" (1.6 m), weight 205 lb (93 kg), SpO2 96 %, not currently breastfeeding.     General: Alert, orientated x3. Cooperative.   No

## 2018-08-08 VITALS
OXYGEN SATURATION: 92 % | DIASTOLIC BLOOD PRESSURE: 69 MMHG | WEIGHT: 205 LBS | SYSTOLIC BLOOD PRESSURE: 140 MMHG | HEIGHT: 63 IN | HEART RATE: 62 BPM | BODY MASS INDEX: 36.32 KG/M2 | RESPIRATION RATE: 16 BRPM | TEMPERATURE: 98 F

## 2018-08-08 LAB
ADENOVIRUS F 40/41 PCR: NEGATIVE
ANION GAP SERPL CALC-SCNC: 6 MMOL/L (ref 0–18)
ASTROVIRUS PCR: NEGATIVE
BASOPHILS # BLD: 0.1 K/UL (ref 0–0.2)
BASOPHILS NFR BLD: 1 %
BUN SERPL-MCNC: 7 MG/DL (ref 8–20)
BUN/CREAT SERPL: 8.6 (ref 10–20)
C CAYETANENSIS DNA SPEC QL NAA+PROBE: NEGATIVE
C. DIFFICILE TOXIN A/B PCR: NEGATIVE
CALCIUM SERPL-MCNC: 8.7 MG/DL (ref 8.5–10.5)
CAMPY SP DNA.DIARRHEA STL QL NAA+PROBE: NEGATIVE
CHLORIDE SERPL-SCNC: 105 MMOL/L (ref 95–110)
CO2 SERPL-SCNC: 26 MMOL/L (ref 22–32)
CREAT SERPL-MCNC: 0.81 MG/DL (ref 0.5–1.5)
CRYPTOSP DNA SPEC QL NAA+PROBE: NEGATIVE
EAEC PAA PLAS AGGR+AATA ST NAA+NON-PRB: NEGATIVE
EC STX1+STX2 + H7 FLIC SPEC NAA+PROBE: NEGATIVE
ENTAMOEBA HISTOLYTICA PCR: NEGATIVE
EOSINOPHIL # BLD: 0.2 K/UL (ref 0–0.7)
EOSINOPHIL NFR BLD: 3 %
EPEC EAE GENE STL QL NAA+NON-PROBE: NEGATIVE
ERYTHROCYTE [DISTWIDTH] IN BLOOD BY AUTOMATED COUNT: 13.3 % (ref 11–15)
ETEC LTA+ST1A+ST1B TOX ST NAA+NON-PROBE: NEGATIVE
GIARDIA LAMBLIA PCR: NEGATIVE
GLUCOSE SERPL-MCNC: 90 MG/DL (ref 70–99)
HCT VFR BLD AUTO: 38.5 % (ref 35–48)
HGB BLD-MCNC: 13 G/DL (ref 12–16)
LYMPHOCYTES # BLD: 1.4 K/UL (ref 1–4)
LYMPHOCYTES NFR BLD: 21 %
MCH RBC QN AUTO: 28.7 PG (ref 27–32)
MCHC RBC AUTO-ENTMCNC: 33.6 G/DL (ref 32–37)
MCV RBC AUTO: 85.2 FL (ref 80–100)
MONOCYTES # BLD: 0.5 K/UL (ref 0–1)
MONOCYTES NFR BLD: 7 %
NEUTROPHILS # BLD AUTO: 4.8 K/UL (ref 1.8–7.7)
NEUTROPHILS NFR BLD: 69 %
NOROVIRUS GI/GII PCR: NEGATIVE
OSMOLALITY UR CALC.SUM OF ELEC: 282 MOSM/KG (ref 275–295)
P SHIGELLOIDES DNA STL QL NAA+PROBE: NEGATIVE
PLATELET # BLD AUTO: 260 K/UL (ref 140–400)
PMV BLD AUTO: 8.4 FL (ref 7.4–10.3)
POTASSIUM SERPL-SCNC: 4.4 MMOL/L (ref 3.3–5.1)
POTASSIUM SERPL-SCNC: 4.4 MMOL/L (ref 3.3–5.1)
RBC # BLD AUTO: 4.52 M/UL (ref 3.7–5.4)
ROTAVIRUS A PCR: NEGATIVE
SALMONELLA DNA SPEC QL NAA+PROBE: NEGATIVE
SAPOVIRUS PCR: NEGATIVE
SHIGELLA SP+EIEC IPAH ST NAA+NON-PROBE: NEGATIVE
SODIUM SERPL-SCNC: 137 MMOL/L (ref 136–144)
V CHOLERAE DNA SPEC QL NAA+PROBE: NEGATIVE
VIBRIO DNA SPEC NAA+PROBE: NEGATIVE
WBC # BLD AUTO: 6.9 K/UL (ref 4–11)
YERSINIA DNA SPEC NAA+PROBE: NEGATIVE

## 2018-08-08 PROCEDURE — 99239 HOSP IP/OBS DSCHRG MGMT >30: CPT | Performed by: HOSPITALIST

## 2018-08-08 RX ORDER — SACCHAROMYCES BOULARDII 250 MG
250 CAPSULE ORAL 2 TIMES DAILY
Status: DISCONTINUED | OUTPATIENT
Start: 2018-08-08 | End: 2018-08-08

## 2018-08-08 RX ORDER — POLYETHYLENE GLYCOL 3350 17 G/17G
17 POWDER, FOR SOLUTION ORAL DAILY PRN
Qty: 30 EACH | Refills: 0 | Status: SHIPPED | OUTPATIENT
Start: 2018-08-08 | End: 2018-09-21

## 2018-08-08 RX ORDER — AMOXICILLIN AND CLAVULANATE POTASSIUM 500; 125 MG/1; MG/1
1 TABLET, FILM COATED ORAL 3 TIMES DAILY
Qty: 22 TABLET | Refills: 0 | Status: SHIPPED | OUTPATIENT
Start: 2018-08-08 | End: 2018-08-21

## 2018-08-08 RX ORDER — SACCHAROMYCES BOULARDII 250 MG
250 CAPSULE ORAL 2 TIMES DAILY
Qty: 60 CAPSULE | Refills: 0 | Status: SHIPPED | OUTPATIENT
Start: 2018-08-08 | End: 2018-09-21

## 2018-08-08 NOTE — PROGRESS NOTES
Blaire Gonzalez is a 62-year-old female recovering from acute diverticulitis. She says that her pain/discomfort level is decreased from yesterday. She has been passing gas and urinating and, this morning at 6 AM, she had a bowel movement.  Though it was sma

## 2018-08-08 NOTE — DISCHARGE SUMMARY
[facility]    PATIENT'S NAME: Susie Benitezquinton ACOSTA   ATTENDING PHYSICIAN: Kelton Rinaldi MD   PATIENT ACCOUNT#:   [id number]    LOCATION:  [address] [place] [address] #:   [id number]       YOB: 1945  ADMISSION DATE:       08/ she may have a stricture from the diverticulitis versus a thus-far-unsuspected occult malignancy. She will follow up with  [facility] in 2 weeks and she must have this worked up. She did have loose stools and her GI panel was completely negative.   Her urinal Dictated By Radha Mclaughlin.  MD Nimco  d: 08/08/2018 16:30:37  t: 08/08/2018 17:08:40  Job [id number]  LAS/

## 2018-08-08 NOTE — PLAN OF CARE
Problem: GASTROINTESTINAL - ADULT  Goal: Minimal or absence of nausea and vomiting  INTERVENTIONS:  - Maintain adequate hydration with IV or PO as ordered and tolerated  - Evaluate effectiveness of ordered antiemetic medications  - Provide nonpharmacologic reports new pain  - Anticipate increased pain with activity and pre-medicate as appropriate  Outcome: Progressing  Slight abdominal discomfort-patient feeling much better

## 2018-08-08 NOTE — DISCHARGE SUMMARY
More than 30 min spent on dc  Dc summary # [id number]  Hospital Discharge Diagnoses: diverticulitis    Lace+ Score: 47  59-90 High Risk  29-58 Medium Risk  0-28   Low Risk. TCM Follow-Up Recommendation:  LACE 29-58:  Moderate Risk of readmission after disc

## 2018-08-08 NOTE — PLAN OF CARE
Problem: Patient Centered Care  Goal: Patient preferences are identified and integrated in the patient's plan of care  Interventions:  - What would you like us to know as we care for you?  Patient here with c/o very frequent stools  - Provide timely, comple

## 2018-08-08 NOTE — PROGRESS NOTES
GI  PROGRESS NOTE    SUBJECTIVE: reports no abdominal pain; tolerating diet.        OBJECTIVE:  Temp:  [97.3 °F (36.3 °C)-98.2 °F (36.8 °C)] 98 °F (36.7 °C)  Pulse:  [62-63] 62  Resp:  [16-18] 16  BP: (124-140)/(66-76) 140/69  Exam  Gen: No acute distress

## 2018-08-09 ENCOUNTER — PATIENT OUTREACH (OUTPATIENT)
Dept: CASE MANAGEMENT | Age: 73
End: 2018-08-09

## 2018-08-09 NOTE — PAYOR COMM NOTE
--------------  DISCHARGE REVIEW    Payor: [facility] [place] #:  [id number]  Authorization Number: [id number]    Admit date: 8/6/18  Admit time:  1838  Discharge Date: 8/8/2018  3:24 PM     Admitting Physician: Aminata Corbin MD  Atte HISTORY AND HOSPITAL COURSE:  This is a very pleasant 15-year-old white female who appears much younger than her stated age, who presents with a history of having some constipation, pain in her lower abdomen, with changes suggestive of acute sigmoid divert VITAL SIGNS:  On discharge, the temperature is 98, pulse 62, respiratory 16, 140/69, 92%. LUNGS:  Clear. HEART:  Normal S1, S2. No S3.  ABDOMEN:  Soft and nontender. EXTREMITIES:  Without edema.   NEUROLOGIC:  She is alert, oriented, friendly, and coope Filed: 2018  1:34 PM Date of Service: 2018  1:32 PM Status: Signed   : Maryln Denver, DO (Physician)   []Manual[]Template  []Copied  [facility]     Progress Note           Helga Freitas Patient Status:  Inpatient             Lab Results  Component Value Date   WBC 10.8 08/07/2018   HGB 12.7 08/07/2018   HCT 38.4 08/07/2018    08/07/2018   CREATSERUM 0.80 08/07/2018   BUN 6 (L) 08/07/2018    (L) 08/07/2018   K 3.7 08/07/2018    08/07/2018   CO2 27 08 Swetha Arguelles DO  >35 min spent with patient. > 50% time was spent counseling patient, discussing plan of care, discussing labs and imaging findings. Spoke with consultant.  All questions answered.            0/8/8768      **Certification        PHYSICIA

## 2018-08-09 NOTE — PROGRESS NOTES
Initial Post Discharge Follow Up   Discharge Date: 8/8/18  Contact Date: 8/9/2018    Consent Verification:  Assessment Completed With: {Consent:5161}  HIPAA Verified?   {Yes/No:829::\"Yes\"}    Discharge Dx:  Acute diverticulitis     General:   • How hav boulardii 250 MG Oral Cap Take 1 capsule (250 mg total) by mouth 2 (two) times daily.  Disp: 60 capsule Rfl: 0   Levothyroxine Sodium 112 MCG Oral Tab Take 1 tablet (112 mcg total) by mouth before breakfast. Disp: 90 tablet Rfl: 0   Cholecalciferol (VITAMIN applicable:  AMI:  .TCMAMITEMP  CHF:  .TCMCHFTEMP  COPD: .TCMCOPDTEMP  CVA: .TCMCVATEMP  CV Surgery:  . TCMCVSURGERY  Pneumonia: . TCMPNEUMONIATEMP  Ortho/Spine:  TCMORTHOSPINETEMP  Re-admit:  . TCMREADMITTEMP    ***       Needs post D/C:   Now that you are h and discussed. Any changes or updates to medications and or orders sent to PCP.

## 2018-08-10 ENCOUNTER — OFFICE VISIT (OUTPATIENT)
Dept: INTERNAL MEDICINE CLINIC | Facility: CLINIC | Age: 73
End: 2018-08-10
Payer: COMMERCIAL

## 2018-08-10 VITALS
HEART RATE: 64 BPM | OXYGEN SATURATION: 98 % | HEIGHT: 63 IN | DIASTOLIC BLOOD PRESSURE: 78 MMHG | TEMPERATURE: 98 F | WEIGHT: 200 LBS | SYSTOLIC BLOOD PRESSURE: 138 MMHG | BODY MASS INDEX: 35.44 KG/M2

## 2018-08-10 DIAGNOSIS — I10 ESSENTIAL HYPERTENSION: ICD-10-CM

## 2018-08-10 DIAGNOSIS — K57.92 ACUTE DIVERTICULITIS: Primary | ICD-10-CM

## 2018-08-10 PROCEDURE — 99496 TRANSJ CARE MGMT HIGH F2F 7D: CPT | Performed by: INTERNAL MEDICINE

## 2018-08-10 RX ORDER — METOPROLOL SUCCINATE 25 MG/1
25 TABLET, EXTENDED RELEASE ORAL DAILY
Refills: 0 | COMMUNITY
Start: 2018-08-10 | End: 2018-12-17

## 2018-08-10 NOTE — PROGRESS NOTES
Several attempts made to reach patient with no return phone call. Patient completed HFU on 8/10/18. Closing encounter.

## 2018-08-10 NOTE — PROGRESS NOTES
HPI:    Aimee Madrid is a 71year old female here today for hospital follow up.    She was discharged from Inpatient hospital, [facility]  to Home   Admission Date: 8/6/18   Discharge Date: 8/8/18  Hospital Discharge Diagnoses (since 7/11/2018) diverticulitis. Patient verbalized understanding. She is much better. Her abdominal pain is almost gone. She states she feels a little something in the left lower quadrant but is very minimal and hardly anything. No nausea or vomiting.   She last had facility-administered medications on file prior to visit. HISTORY: reconciled and reviewed with patient  She  has a past medical history of Acute meniscal tear of left knee (1/5/2016); Baker's cyst (1/5/2016);  Displacement of lumbar intervertebral di anxiety        PHYSICAL EXAM:   No LMP recorded. Patient has had a hysterectomy. Estimated body mass index is 35.43 kg/m² as calculated from the following:    Height as of this encounter: 5' 3\" (1.6 m). Weight as of this encounter: 200 lb (90.7 kg). severe  · Amount and/or Complexity of Data to Be Reviewed: severe  · Risk of Significant Complications, Morbidity, and/or Mortality: severe    Overall Risk:   severe    Patient seen within 7 days from date of discharge.      Naima Strickland MD, 8/10/2018

## 2018-08-21 ENCOUNTER — OFFICE VISIT (OUTPATIENT)
Dept: INTERNAL MEDICINE CLINIC | Facility: CLINIC | Age: 73
End: 2018-08-21
Payer: COMMERCIAL

## 2018-08-21 VITALS
WEIGHT: 197.81 LBS | TEMPERATURE: 98 F | HEIGHT: 63 IN | SYSTOLIC BLOOD PRESSURE: 114 MMHG | OXYGEN SATURATION: 98 % | DIASTOLIC BLOOD PRESSURE: 74 MMHG | BODY MASS INDEX: 35.05 KG/M2 | HEART RATE: 63 BPM

## 2018-08-21 DIAGNOSIS — Z12.11 COLON CANCER SCREENING: ICD-10-CM

## 2018-08-21 DIAGNOSIS — I10 ESSENTIAL HYPERTENSION: ICD-10-CM

## 2018-08-21 DIAGNOSIS — K57.92 ACUTE DIVERTICULITIS: Primary | ICD-10-CM

## 2018-08-21 PROCEDURE — 99214 OFFICE O/P EST MOD 30 MIN: CPT | Performed by: INTERNAL MEDICINE

## 2018-08-21 PROCEDURE — G0463 HOSPITAL OUTPT CLINIC VISIT: HCPCS | Performed by: INTERNAL MEDICINE

## 2018-08-21 RX ORDER — AMOXICILLIN AND CLAVULANATE POTASSIUM 500; 125 MG/1; MG/1
1 TABLET, FILM COATED ORAL 3 TIMES DAILY
Qty: 30 TABLET | Refills: 0 | Status: SHIPPED | OUTPATIENT
Start: 2018-08-21 | End: 2018-09-21 | Stop reason: ALTCHOICE

## 2018-08-21 NOTE — PROGRESS NOTES
HPI:   Demetrio Salas is a 71year old female presents with the following problems. Patient seems to have recovered from her acute diverticulitis. She is asymptomatic. She has finished her course of Augmentin. She is following a low residue diet. Metoprolol Succinate ER 25 MG Oral Tablet 24 Hr Take 1 tablet (25 mg total) by mouth daily. Disp:  Rfl: 0   PEG 3350 Oral Powd Pack Take 17 g by mouth daily as needed.  Disp: 30 each Rfl: 0   saccharomyces boulardii 250 MG Oral Cap Take 1 capsule (250 mg great toenail  03-: ELECTROCARDIOGRAM, COMPLETE      Comment: Scan to media tab 03- 12-: ELECTROCARDIOGRAM, COMPLETE      Comment: Scan to media tab 12- 12-: ELECTROCARDIOGRAM, COMPLETE      Comment: Scan to media tab 1 METABOLIC PANEL (8)   Result Value Ref Range   Glucose 106 (H) 70 - 99 mg/dL   Sodium 128 (L) 136 - 144 mmol/L   Potassium 3.6 3.3 - 5.1 mmol/L   Chloride 96 95 - 110 mmol/L   CO2 24 22 - 32 mmol/L   BUN 8 8 - 20 mg/dL   Creatinine 0.79 0.50 - 1.50 mg/dL Result Value Ref Range   Hold Lavender Auto Resulted    -GI STOOL PANEL BY PCR   Result Value Ref Range   Campylobacter Pcr Negative Negative   C.  Difficile Toxin A/B Pcr Negative Negative   Plesiomonas Shigelloides Pcr Negative Negative   Salmonella Pcr %   Lymphocyte % 17 %   Monocyte % 8 %   Eosinophil % 2 %   Basophil % 0 %   Neutrophil Absolute 7.8 (H) 1.8 - 7.7 K/UL   Lymphocyte Absolute 1.8 1.0 - 4.0 K/UL   Monocyte Absolute 0.9 0.0 - 1.0 K/UL   Eosinophil Absolute 0.2 0.0 - 0.7 K/UL   Basophil Abso depression or anxiety      EXAM:   /74 (BP Location: Right arm, Patient Position: Sitting, Cuff Size: adult)   Pulse 63   Temp 98 °F (36.7 °C) (Oral)   Ht 5' 3\" (1.6 m)   Wt 197 lb 12.8 oz (89.7 kg)   SpO2 98%   BMI 35.04 kg/m²     GENERAL:  well de

## 2018-09-21 ENCOUNTER — OFFICE VISIT (OUTPATIENT)
Dept: INTERNAL MEDICINE CLINIC | Facility: CLINIC | Age: 73
End: 2018-09-21
Payer: COMMERCIAL

## 2018-09-21 VITALS
DIASTOLIC BLOOD PRESSURE: 80 MMHG | HEART RATE: 56 BPM | HEIGHT: 63 IN | BODY MASS INDEX: 34.91 KG/M2 | WEIGHT: 197 LBS | TEMPERATURE: 99 F | SYSTOLIC BLOOD PRESSURE: 120 MMHG

## 2018-09-21 DIAGNOSIS — E03.9 ACQUIRED HYPOTHYROIDISM: ICD-10-CM

## 2018-09-21 DIAGNOSIS — K57.30 DIVERTICULOSIS OF LARGE INTESTINE WITHOUT HEMORRHAGE: Primary | ICD-10-CM

## 2018-09-21 DIAGNOSIS — I10 ESSENTIAL HYPERTENSION: ICD-10-CM

## 2018-09-21 PROBLEM — N80.9 ENDOMETRIOSIS: Status: ACTIVE | Noted: 2018-09-21

## 2018-09-21 PROBLEM — M48.00 SPINAL STENOSIS: Status: ACTIVE | Noted: 2018-09-21

## 2018-09-21 PROBLEM — Z90.710 HISTORY OF HYSTERECTOMY: Status: ACTIVE | Noted: 2018-09-21

## 2018-09-21 PROCEDURE — G0463 HOSPITAL OUTPT CLINIC VISIT: HCPCS | Performed by: INTERNAL MEDICINE

## 2018-09-21 PROCEDURE — 99214 OFFICE O/P EST MOD 30 MIN: CPT | Performed by: INTERNAL MEDICINE

## 2018-09-21 NOTE — PROGRESS NOTES
HPI:   Kat Morales is a 71year old female presents with the following problems. Patient feels well. She is starting to slowly increase fiber in the diet. The extra fiber is making her feel \"uncomfortable\".   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 by MRI scan December 9, 2015 4/13/2015: Displacement of lumbar intervertebral disc without   myelopathy  2018, 2013: Diverticulitis  2018: Diverticulitis  No date: Esophageal reflux  No date:  Katrina Wilhelm date:  TOTAL ABDOM HYSTERECTOMY   Family History   Problem Relation Age of Onset   • Hypertension Mother    • Thyroid Disorder Mother         thyroid problems   • Kidney Disease Mother         Chronic kidney disease   • Cancer Mother         Cancer-breast Calcium, Total 8.5 8.5 - 10.5 mg/dL    BUN/CREA Ratio 7.5 (L) 10.0 - 20.0    Anion Gap 6 0 - 18 mmol/L    Calculated Osmolality 274 (L) 275 - 295 mOsm/kg    GFR, Non- >60 >=60    GFR, -American >60 >=60   POTASSIUM   Result Value Ref Negative Negative    Entamoeba Histolytica Pcr Negative Negative    Giardia Lamblia Pcr Negative Negative    Adenovirus F 40/41 Pcr Negative Negative    Astrovirus Pcr Negative Negative    Norovirus Gi/Gii Pcr Negative Negative    Rotavirus A Pcr Negative Eosinophil % 3 %    Basophil % 1 %    Neutrophil Absolute 4.8 1.8 - 7.7 K/UL    Lymphocyte Absolute 1.4 1.0 - 4.0 K/UL    Monocyte Absolute 0.5 0.0 - 1.0 K/UL    Eosinophil Absolute 0.2 0.0 - 0.7 K/UL    Basophil Absolute 0.1 0.0 - 0.2 K/UL      Social His me in March. Slowly increase fiber in diet. 2. Essential hypertension  Blood pressure under good control. 3. Acquired hypothyroidism  On supplement. Asymptomatic. Patient to see Dr. Herbert Iqbal early October.         Follow up with me 6 months    Jeronimo

## 2018-09-22 RX ORDER — LEVOTHYROXINE SODIUM 112 UG/1
TABLET ORAL
Qty: 90 TABLET | Refills: 0 | OUTPATIENT
Start: 2018-09-22

## 2018-09-24 ENCOUNTER — HOSPITAL ENCOUNTER (EMERGENCY)
Facility: HOSPITAL | Age: 73
Discharge: HOME | End: 2018-09-24
Attending: EMERGENCY MEDICINE
Payer: MEDICARE

## 2018-09-24 ENCOUNTER — TELEPHONE (OUTPATIENT)
Dept: INTERNAL MEDICINE CLINIC | Facility: CLINIC | Age: 73
End: 2018-09-24

## 2018-09-24 VITALS
HEART RATE: 59 BPM | DIASTOLIC BLOOD PRESSURE: 82 MMHG | BODY MASS INDEX: 34.37 KG/M2 | SYSTOLIC BLOOD PRESSURE: 150 MMHG | RESPIRATION RATE: 19 BRPM | HEIGHT: 63 IN | WEIGHT: 194 LBS | TEMPERATURE: 98 F | OXYGEN SATURATION: 99 %

## 2018-09-24 DIAGNOSIS — R10.2 SUPRAPUBIC PAIN: Primary | ICD-10-CM

## 2018-09-24 DIAGNOSIS — R82.90 ABNORMAL URINALYSIS: Primary | ICD-10-CM

## 2018-09-24 LAB
BILIRUB UR QL: NEGATIVE
CLARITY UR: CLEAR
COLOR UR: YELLOW
GLUCOSE UR-MCNC: NEGATIVE MG/DL
HGB UR QL STRIP.AUTO: NEGATIVE
KETONES UR-MCNC: NEGATIVE MG/DL
LEUKOCYTE ESTERASE UR QL STRIP.AUTO: NEGATIVE
NITRITE UR QL STRIP.AUTO: NEGATIVE
PH UR: 6 [PH] (ref 5–8)
PROT UR-MCNC: NEGATIVE MG/DL
SP GR UR STRIP: 1 (ref 1–1.03)
UROBILINOGEN UR STRIP-ACNC: <2
VIT C UR-MCNC: NEGATIVE MG/DL

## 2018-09-24 PROCEDURE — 99283 EMERGENCY DEPT VISIT LOW MDM: CPT

## 2018-09-24 PROCEDURE — 81003 URINALYSIS AUTO W/O SCOPE: CPT | Performed by: EMERGENCY MEDICINE

## 2018-09-24 NOTE — ED INITIAL ASSESSMENT (HPI)
Reports vaginal bleeding and suprapubic abd pain with urinary frequency since Saturday. Reports right flank pain. Denies fevers.

## 2018-09-24 NOTE — ED PROVIDER NOTES
Patient Seen in: [facility] AND [facility] Emergency Department    History   Patient presents with:  Urinary Symptoms (urologic)    Stated Complaint: vaginal bleeding    HPI    67-year-old female with history of diverticulitis, hypertension, hypothyroidism, here History:  1/7/2016: ARTHROSCOPY KNEE LEFT;  Left      Comment:  Performed by Eun Roque MD at [facility]  No date: [address]  No date: CHOLECYSTECTOMY  08/05/2013: COLONOSCOPY      Comment:  Dr. Cordelia Yun - repeat i rash.   Neurological: Negative for weakness, light-headedness and headaches. All other systems reviewed and are negative. Positive for stated complaint: vaginal bleeding  Other systems are as noted in HPI. Constitutional and vital signs reviewed. Oximeter:  Pulse oximetry on room air is 99%, indicating adequate oxygenation.     PROCEDURES:  none    DIAGNOSTICS:   Labs:  Recent Results (from the past 24 hour(s))   URINALYSIS WITH CULTURE REFLEX    Collection Time: 09/24/18 12:02 PM   Result Value Ref diverticulitis; Hyponatremia; Diverticulitis; Allergic state; Drug allergy; Endometriosis; History of hysterectomy; Hyperlipidemia; Meniere's disease; and Spinal stenosis on their problem list. to contribute to the complexity of his ED evaluation.     - pt

## 2018-09-24 NOTE — ED NOTES
Pt presents with right lower abdominal pain and bleeding \"in the front\" on Saturday. States had leftover amoxicillin from previous UTI, took 2 doses on Saturday, denies additional bleeding. Pt denies taking pain medication. Denies n/v/d.   Hx of divert

## 2018-09-24 NOTE — TELEPHONE ENCOUNTER
Discussed with patient. She did go to emergency room. Urinalysis was negative for any blood. Patient related that Saturday she started to have letting the urine. Frequent urination of about 5-6 times. Some burning.   She took a half a tablet of her Aug

## 2018-09-24 NOTE — TELEPHONE ENCOUNTER
Pt. Says she went to the ER and nothing was done but a dipstick there was no blood in her urine she was told to take ibuprofen ph.  # [phone]   Routed to clinical

## 2018-09-25 NOTE — TELEPHONE ENCOUNTER
Pt is calling back to let Dr Ordaz Neither know that she has a little bit of pain but slept much better. Pt will be calling back on Thursday to let Dr Ordaz Neither how it goes then. If the pain is still there, she will come in.  Pt states she does not need a call back and will de

## 2018-09-27 NOTE — TELEPHONE ENCOUNTER
Pt called, feeling better today, does not have pain any longer in pelvic area  If pt gets any symptoms she will call right away  Thank you to Dr Carlo Stacy for being so caring  Tasked to Dr Carlo Stacy

## 2018-10-02 ENCOUNTER — OFFICE VISIT (OUTPATIENT)
Dept: ENDOCRINOLOGY CLINIC | Facility: CLINIC | Age: 73
End: 2018-10-02
Payer: COMMERCIAL

## 2018-10-02 VITALS
DIASTOLIC BLOOD PRESSURE: 75 MMHG | HEART RATE: 58 BPM | SYSTOLIC BLOOD PRESSURE: 121 MMHG | BODY MASS INDEX: 35 KG/M2 | WEIGHT: 196 LBS

## 2018-10-02 DIAGNOSIS — E66.09 CLASS 2 OBESITY DUE TO EXCESS CALORIES IN ADULT, UNSPECIFIED BMI, UNSPECIFIED WHETHER SERIOUS COMORBIDITY PRESENT: ICD-10-CM

## 2018-10-02 DIAGNOSIS — E03.9 HYPOTHYROIDISM, UNSPECIFIED TYPE: Primary | ICD-10-CM

## 2018-10-02 DIAGNOSIS — R73.03 PRE-DIABETES: ICD-10-CM

## 2018-10-02 DIAGNOSIS — E78.5 DYSLIPIDEMIA: ICD-10-CM

## 2018-10-02 DIAGNOSIS — E55.9 VITAMIN D DEFICIENCY: ICD-10-CM

## 2018-10-02 PROCEDURE — G0463 HOSPITAL OUTPT CLINIC VISIT: HCPCS | Performed by: INTERNAL MEDICINE

## 2018-10-02 PROCEDURE — 99213 OFFICE O/P EST LOW 20 MIN: CPT | Performed by: INTERNAL MEDICINE

## 2018-10-02 NOTE — PROGRESS NOTES
Return Office Visit     CHIEF COMPLAINT:    Hypothyroidism   Vitamin D deficiency  Osteopenia  Obesity  Pre Diabetes    HISTORY OF PRESENT ILLNESS:  Linnette Alcazar is a 71year old female who presents for a FU visit for hypothyroidism and Osteopenia. SWELLING  Latex                       Comment:Other reaction(s): LATEX             Other reaction(s): LATEX  Levofloxacin                Comment:Other reaction(s): LEVOFLOXACIN             Other reaction(s): LEVOFLOXACIN  Methylprednisolone          Commen anxiety  Hematology/Lymphatics: Negative for: bruising, lower extremity edema  Endocrine: Negative for: polyuria, polydypsia  Skin: Negative for: rash, blister, cellulitis      PHYSICAL EXAM:    10/02/18  0925   BP: 121/75   Pulse: 58   Weight: 196 lb (88. Assay, Thyroid Stim Hormone      Free T4, (Free Thyroxine)      Lipid Panel [E]      Vitamin D, 25-Hydroxy      Basic Metabolic Panel (8)

## 2018-10-03 RX ORDER — LEVOTHYROXINE SODIUM 112 UG/1
112 TABLET ORAL
Qty: 90 TABLET | Refills: 0 | Status: SHIPPED | OUTPATIENT
Start: 2018-10-03 | End: 2018-12-17

## 2018-10-03 RX ORDER — LEVOTHYROXINE SODIUM 112 UG/1
TABLET ORAL
Qty: 90 TABLET | Refills: 0 | OUTPATIENT
Start: 2018-10-03

## 2018-10-11 NOTE — TELEPHONE ENCOUNTER
Please call patient and let her know that I was thinking about her episodes of pelvic discomfort and the intermittent abnormal urinalysis that she had in the past.  Please ask her to go for a repeat urine and  urine culture just to make sure her urine is s

## 2018-10-11 NOTE — TELEPHONE ENCOUNTER
I spoke with patient and relayed Dr. Isabel Nolasco message. Patient verbalized understanding. She says she has an OBGYN and sees Dr. Alvin Phoenix. Patient says she will do these things when she gets back from [place].  She has to go up to [place] because her b

## 2018-10-15 ENCOUNTER — HOSPITAL ENCOUNTER (OUTPATIENT)
Dept: CT IMAGING | Facility: HOSPITAL | Age: 73
Discharge: HOME | End: 2018-10-15
Attending: INTERNAL MEDICINE
Payer: MEDICARE

## 2018-10-15 ENCOUNTER — TELEPHONE (OUTPATIENT)
Dept: INTERNAL MEDICINE CLINIC | Facility: CLINIC | Age: 73
End: 2018-10-15

## 2018-10-15 ENCOUNTER — OFFICE VISIT (OUTPATIENT)
Dept: INTERNAL MEDICINE CLINIC | Facility: CLINIC | Age: 73
End: 2018-10-15
Payer: COMMERCIAL

## 2018-10-15 VITALS
HEART RATE: 64 BPM | BODY MASS INDEX: 34.73 KG/M2 | SYSTOLIC BLOOD PRESSURE: 116 MMHG | WEIGHT: 196 LBS | OXYGEN SATURATION: 98 % | HEIGHT: 63 IN | DIASTOLIC BLOOD PRESSURE: 74 MMHG | TEMPERATURE: 98 F

## 2018-10-15 DIAGNOSIS — K57.30 DIVERTICULOSIS OF LARGE INTESTINE WITHOUT HEMORRHAGE: ICD-10-CM

## 2018-10-15 DIAGNOSIS — R82.90 ABNORMAL URINALYSIS: ICD-10-CM

## 2018-10-15 DIAGNOSIS — K57.92 ACUTE DIVERTICULITIS: ICD-10-CM

## 2018-10-15 DIAGNOSIS — I10 ESSENTIAL HYPERTENSION: ICD-10-CM

## 2018-10-15 DIAGNOSIS — R10.2 PELVIC PAIN: ICD-10-CM

## 2018-10-15 DIAGNOSIS — R10.2 PELVIC PAIN: Primary | ICD-10-CM

## 2018-10-15 PROCEDURE — G0463 HOSPITAL OUTPT CLINIC VISIT: HCPCS | Performed by: INTERNAL MEDICINE

## 2018-10-15 PROCEDURE — 99215 OFFICE O/P EST HI 40 MIN: CPT | Performed by: INTERNAL MEDICINE

## 2018-10-15 PROCEDURE — 74176 CT ABD & PELVIS W/O CONTRAST: CPT | Performed by: INTERNAL MEDICINE

## 2018-10-15 NOTE — PROGRESS NOTES
HPI:   Noah Santos is a 71year old female presents with the following problems. Patient continues to have some very mild low pelvic pain. This has persisted since she was in the emergency room September 24. Is much better however.   There is a p diverticulitis. She will see him tomorrow. I also asked her to see gynecology. She wishes to see a gynecologist out of [address] [facility]. [facility].   She has the name of the gynecologist.     This way between surgery and gynecology we can 2010    Foreign body left heel, Hallux valgus, sharp debridement, removal of a piece glass   • Hearing loss 8/19/2013   • HTN (hypertension)    • Hypothyroidism    • Meniere's disease    • Osteoarthritis of right hip 6/23/2016   • Pre-diabetes 10/20/2017 hospital encounter of 09/24/18   URINALYSIS WITH CULTURE REFLEX   Result Value Ref Range    Urine Color Yellow Yellow    Clarity Urine Clear Clear    Spec Gravity 1.004 1.002 - 1.035    pH Urine 6.0 5.0 - 8.0    Protein Urine Negative Negative mg/dL    Glu diverticulitis. I did discuss this with radiology. There may be a fistula. It is unclear as the abnormality with the air in the vaginal cuff was very tiny. Nonetheless I did ask her to see  because he made an appointment for her tomorrow.   She will

## 2018-10-16 ENCOUNTER — TELEPHONE (OUTPATIENT)
Dept: INTERNAL MEDICINE CLINIC | Facility: CLINIC | Age: 73
End: 2018-10-16

## 2018-10-16 DIAGNOSIS — E78.5 HYPERLIPIDEMIA, UNSPECIFIED HYPERLIPIDEMIA TYPE: ICD-10-CM

## 2018-10-16 DIAGNOSIS — D73.4 CYST OF SPLEEN: Primary | ICD-10-CM

## 2018-10-16 NOTE — TELEPHONE ENCOUNTER
Please let patient know that when reviewing her CAT scan her splenic cyst was noted.   For safety sake I communicate with Dr. Ryan Andino who she had seen before for her splenic cyst.  He recommended a splenic ultrasound that can better be compared to her past

## 2018-10-16 NOTE — TELEPHONE ENCOUNTER
HI Dr. Rafael Beal. He has had a CAT scan yesterday for follow-up of some pelvic pain. Noted is her spleen that measured 7.5 cm and was cystic. This may reflect a posttraumatic cyst or vascular lesion such as hemangioma.   You had evaluated David cary in 201

## 2018-10-17 NOTE — TELEPHONE ENCOUNTER
Pt notified splenic cyst noted on her CAT scan which has been noted previous   Per DR Gtz rec a f/u US of spleen  - as long as pt is asymptomatic she can be observed   Order in computer - not urgent but pt  to schedule appt     FYI- Pt scheduled pelvi

## 2018-10-17 NOTE — TELEPHONE ENCOUNTER
Pt called to let you know she is scheduled for US at [facility] on Monday/Oct 22 at 11:30 am

## 2018-10-22 ENCOUNTER — HOSPITAL ENCOUNTER (OUTPATIENT)
Dept: ULTRASOUND IMAGING | Age: 73
Discharge: HOME | End: 2018-10-22
Attending: INTERNAL MEDICINE
Payer: MEDICARE

## 2018-10-22 ENCOUNTER — TELEPHONE (OUTPATIENT)
Dept: INTERNAL MEDICINE CLINIC | Facility: CLINIC | Age: 73
End: 2018-10-22

## 2018-10-22 DIAGNOSIS — D73.4 CYST OF SPLEEN: ICD-10-CM

## 2018-10-22 PROCEDURE — 76705 ECHO EXAM OF ABDOMEN: CPT | Performed by: INTERNAL MEDICINE

## 2018-10-23 NOTE — TELEPHONE ENCOUNTER
Patient contacted and relayed 's message. Patient verbalized understanding but is not willing to try the Lipitor, states she has had too many family members with problems caused by cholesterol medications.  Patient states she will try to eat health

## 2018-10-23 NOTE — TELEPHONE ENCOUNTER
Please let patient know that her spleen ultrasound came out satisfactory. The size showed a slight increase when compared to December 2016 however a decreased size when compared to December 2015.   I did medicate with Dr. Rafael Beal and both of us feel no fu

## 2018-11-24 ENCOUNTER — TELEPHONE (OUTPATIENT)
Dept: INTERNAL MEDICINE CLINIC | Facility: CLINIC | Age: 73
End: 2018-11-24

## 2018-11-24 NOTE — TELEPHONE ENCOUNTER
Discussed with patient today. Discussed  her probable fistula between sigmoid and vagina. She is asymptomatic. No abdominal pain.   She indicates she will be scheduled for colonoscopy late February but I did tell her that I was discussed with Dr. Wenceslao Pittman

## 2018-12-13 ENCOUNTER — TELEPHONE (OUTPATIENT)
Dept: INTERNAL MEDICINE CLINIC | Facility: CLINIC | Age: 73
End: 2018-12-13

## 2018-12-13 ENCOUNTER — APPOINTMENT (OUTPATIENT)
Dept: LAB | Age: 73
End: 2018-12-13
Attending: INTERNAL MEDICINE
Payer: MEDICARE

## 2018-12-13 DIAGNOSIS — E55.9 VITAMIN D DEFICIENCY: ICD-10-CM

## 2018-12-13 DIAGNOSIS — I10 ESSENTIAL HYPERTENSION: Primary | ICD-10-CM

## 2018-12-13 DIAGNOSIS — E87.1 HYPONATREMIA: ICD-10-CM

## 2018-12-13 DIAGNOSIS — I10 ESSENTIAL HYPERTENSION: ICD-10-CM

## 2018-12-13 DIAGNOSIS — E78.5 HYPERLIPIDEMIA, UNSPECIFIED HYPERLIPIDEMIA TYPE: ICD-10-CM

## 2018-12-13 DIAGNOSIS — R73.03 PRE-DIABETES: ICD-10-CM

## 2018-12-13 DIAGNOSIS — E03.9 HYPOTHYROIDISM, UNSPECIFIED TYPE: ICD-10-CM

## 2018-12-13 PROCEDURE — 84460 ALANINE AMINO (ALT) (SGPT): CPT

## 2018-12-13 PROCEDURE — 84443 ASSAY THYROID STIM HORMONE: CPT

## 2018-12-13 PROCEDURE — 80061 LIPID PANEL: CPT

## 2018-12-13 PROCEDURE — 80048 BASIC METABOLIC PNL TOTAL CA: CPT

## 2018-12-13 PROCEDURE — 82306 VITAMIN D 25 HYDROXY: CPT

## 2018-12-13 PROCEDURE — 84450 TRANSFERASE (AST) (SGOT): CPT

## 2018-12-13 PROCEDURE — 36415 COLL VENOUS BLD VENIPUNCTURE: CPT

## 2018-12-13 PROCEDURE — 84439 ASSAY OF FREE THYROXINE: CPT

## 2018-12-17 RX ORDER — METOPROLOL SUCCINATE 25 MG/1
25 TABLET, EXTENDED RELEASE ORAL DAILY
Qty: 90 TABLET | Refills: 1 | Status: SHIPPED | OUTPATIENT
Start: 2018-12-17 | End: 2019-04-09

## 2018-12-17 RX ORDER — LEVOTHYROXINE SODIUM 112 UG/1
112 TABLET ORAL
Qty: 90 TABLET | Refills: 0 | Status: SHIPPED | OUTPATIENT
Start: 2018-12-17 | End: 2019-04-09

## 2018-12-17 NOTE — TELEPHONE ENCOUNTER
Refill request is for a maintenance medication and has met the criteria specified in the Ambulatory Medication Refill Standing Order for eligibility, visits, laboratory, alerts and was sent to the requested pharmacy.     Requested Prescriptions     Signed P

## 2018-12-26 NOTE — TELEPHONE ENCOUNTER
Called patient and relayed DR. DOBSON message - verbalized understanding.  Patient has stomach issues - scheduled for 12/17 with

## 2018-12-27 ENCOUNTER — OFFICE VISIT (OUTPATIENT)
Dept: INTERNAL MEDICINE CLINIC | Facility: CLINIC | Age: 73
End: 2018-12-27
Payer: COMMERCIAL

## 2018-12-27 VITALS
SYSTOLIC BLOOD PRESSURE: 112 MMHG | HEART RATE: 68 BPM | TEMPERATURE: 98 F | WEIGHT: 193.81 LBS | BODY MASS INDEX: 34.34 KG/M2 | HEIGHT: 63 IN | DIASTOLIC BLOOD PRESSURE: 70 MMHG

## 2018-12-27 DIAGNOSIS — I10 ESSENTIAL HYPERTENSION: ICD-10-CM

## 2018-12-27 DIAGNOSIS — N82.3 RECTOVAGINAL FISTULA: ICD-10-CM

## 2018-12-27 DIAGNOSIS — R10.10 PAIN OF UPPER ABDOMEN: Primary | ICD-10-CM

## 2018-12-27 PROCEDURE — G0463 HOSPITAL OUTPT CLINIC VISIT: HCPCS | Performed by: INTERNAL MEDICINE

## 2018-12-27 PROCEDURE — 99214 OFFICE O/P EST MOD 30 MIN: CPT | Performed by: INTERNAL MEDICINE

## 2018-12-27 NOTE — PROGRESS NOTES
Maribel Mcginnis is a 68year old female. HPI:   Patient presents with:  Pain: for about 2 months sharp pains that come and go        As per nursing documentation.   Patient states that she will get epigastric pain little bit off to the left but in a smal before breakfast. Disp: 90 tablet Rfl: 0   Metoprolol Succinate ER 25 MG Oral Tablet 24 Hr Take 1 tablet (25 mg total) by mouth daily. Disp: 90 tablet Rfl: 1   Cholecalciferol (VITAMIN D) 1000 UNITS Oral Tab Take 1,000 Units by mouth daily.  Disp:  Rfl: frequency of urination.   NEURO:  Voices no  headaches or dizziness    EXAM:   /70 (BP Location: Right arm, Patient Position: Sitting, Cuff Size: adult)   Pulse 68   Temp 98.1 °F (36.7 °C) (Oral)   Ht 5' 3\" (1.6 m)   Wt 193 lb 12.8 oz (87.9 kg)   BMI MD  12/27/2018  2:40 PM

## 2019-01-05 ENCOUNTER — TELEPHONE (OUTPATIENT)
Dept: INTERNAL MEDICINE CLINIC | Facility: CLINIC | Age: 74
End: 2019-01-05

## 2019-01-05 NOTE — TELEPHONE ENCOUNTER
Please notify patient that I discussed last week with Dr. Anatoliy Meza about canceling the barium enema and colonoscopy. He agreed.   We both thought as Frances Bai does that since she is doing well we would not want to take any chances in regards to causing proble

## 2019-01-17 ENCOUNTER — PATIENT MESSAGE (OUTPATIENT)
Dept: INTERNAL MEDICINE CLINIC | Facility: CLINIC | Age: 74
End: 2019-01-17

## 2019-01-17 NOTE — TELEPHONE ENCOUNTER
From: Kyung Mccrary  To: Shakeel Winston MD  Sent: 1/17/2019 8:59 AM CST  Subject: Non-Urgent Medical Question    Good Morning Dr. Lucrecia Witt:    My yearly request concerning the Senior Exercise Class.   Please, can you send permission for me to attend the w

## 2019-01-17 NOTE — TELEPHONE ENCOUNTER
Faxed MD note from outbox to Pop Turner at [facility] with fax number provided below by patient. Fax confirmation received.

## 2019-04-09 ENCOUNTER — OFFICE VISIT (OUTPATIENT)
Dept: INTERNAL MEDICINE CLINIC | Facility: CLINIC | Age: 74
End: 2019-04-09
Payer: COMMERCIAL

## 2019-04-09 VITALS
TEMPERATURE: 98 F | SYSTOLIC BLOOD PRESSURE: 100 MMHG | RESPIRATION RATE: 16 BRPM | DIASTOLIC BLOOD PRESSURE: 60 MMHG | HEIGHT: 63 IN | HEART RATE: 60 BPM | BODY MASS INDEX: 34 KG/M2 | OXYGEN SATURATION: 96 %

## 2019-04-09 DIAGNOSIS — Z00.00 ROUTINE HEALTH MAINTENANCE: ICD-10-CM

## 2019-04-09 DIAGNOSIS — I10 ESSENTIAL HYPERTENSION: ICD-10-CM

## 2019-04-09 DIAGNOSIS — R10.13 EPIGASTRIC ABDOMINAL PAIN: Primary | ICD-10-CM

## 2019-04-09 DIAGNOSIS — E03.9 ACQUIRED HYPOTHYROIDISM: ICD-10-CM

## 2019-04-09 DIAGNOSIS — D73.4 CYST OF SPLEEN: ICD-10-CM

## 2019-04-09 PROCEDURE — G0463 HOSPITAL OUTPT CLINIC VISIT: HCPCS | Performed by: INTERNAL MEDICINE

## 2019-04-09 PROCEDURE — 99214 OFFICE O/P EST MOD 30 MIN: CPT | Performed by: INTERNAL MEDICINE

## 2019-04-09 RX ORDER — MECLIZINE HCL 12.5 MG/1
12.5 TABLET ORAL DAILY
Qty: 30 TABLET | Refills: 3 | Status: SHIPPED | OUTPATIENT
Start: 2019-04-09 | End: 2019-10-25

## 2019-04-09 RX ORDER — METOPROLOL SUCCINATE 25 MG/1
25 TABLET, EXTENDED RELEASE ORAL DAILY
Qty: 90 TABLET | Refills: 3 | Status: SHIPPED | OUTPATIENT
Start: 2019-04-09 | End: 2020-02-29

## 2019-04-09 RX ORDER — LEVOTHYROXINE SODIUM 112 UG/1
112 TABLET ORAL
Qty: 90 TABLET | Refills: 3 | Status: SHIPPED | OUTPATIENT
Start: 2019-04-09 | End: 2020-02-29

## 2019-04-09 RX ORDER — FAMOTIDINE 20 MG/1
20 TABLET ORAL DAILY PRN
COMMUNITY
End: 2019-04-09

## 2019-04-09 NOTE — PROGRESS NOTES
HPI:   Rubén Ogden is a 68year old female presents with the following problems. Patient wishes to return for Medicare annual physical at some other day. She wishes this to be a regular office visit for her epigastric pain.     Patient states that hypertension. Blood pressure under good control with metoprolol. She has hypothyroidism. We will update labs. Refill levothyroxine. Will address shingles vaccine and Prevnar with annual Medicare visit.     Will discuss addition of statin when we ge localized osteoarthrosis, shoulder region 6/20/2013    Log Date: 04/24/2013    • Rotator cuff tear, right    • Splenic cyst 4/7/2015   • Splenic cyst 12/23/2016   • Tinnitus       Past Surgical History:   Procedure Laterality Date   • ARTHROSCOPY KNEE LEFT GFR, Non- >60 >=60    GFR, -American >60 >=60   ASSAY, THYROID STIM HORMONE   Result Value Ref Range    TSH 1.74 0.45 - 5.33 uIU/mL   FREE T4 (FREE THYROXINE)   Result Value Ref Range    Free T4 1.07 0.58 - 1.64 ng/dL   VITAMIN D, 25 S1 and S2 normal.   GI:  good BS's. no masses, organomegaly or tenderness. Specifically there is no epigastric tenderness. Patient has had her gallbladder out many years ago.   MUSCULOSKELETAL:  back is not tender, no joint swelling  EXTREMITIES:  no cyan

## 2019-04-11 ENCOUNTER — TELEPHONE (OUTPATIENT)
Dept: INTERNAL MEDICINE CLINIC | Facility: CLINIC | Age: 74
End: 2019-04-11

## 2019-04-11 NOTE — TELEPHONE ENCOUNTER
[employer] sent Prior Authorization request form for:  Meclizine  Form placed in purple folder  Tasked to RX

## 2019-10-21 ENCOUNTER — TELEPHONE (OUTPATIENT)
Dept: OPHTHALMOLOGY | Facility: CLINIC | Age: 74
End: 2019-10-21

## 2019-10-21 ENCOUNTER — LAB ENCOUNTER (OUTPATIENT)
Dept: LAB | Age: 74
End: 2019-10-21
Attending: INTERNAL MEDICINE
Payer: MEDICARE

## 2019-10-21 ENCOUNTER — TELEPHONE (OUTPATIENT)
Dept: INTERNAL MEDICINE CLINIC | Facility: CLINIC | Age: 74
End: 2019-10-21

## 2019-10-21 ENCOUNTER — OFFICE VISIT (OUTPATIENT)
Dept: INTERNAL MEDICINE CLINIC | Facility: CLINIC | Age: 74
End: 2019-10-21
Payer: COMMERCIAL

## 2019-10-21 VITALS
TEMPERATURE: 98 F | HEART RATE: 66 BPM | DIASTOLIC BLOOD PRESSURE: 72 MMHG | SYSTOLIC BLOOD PRESSURE: 124 MMHG | OXYGEN SATURATION: 100 %

## 2019-10-21 DIAGNOSIS — H81.09 MENIERE'S DISEASE, UNSPECIFIED LATERALITY: ICD-10-CM

## 2019-10-21 DIAGNOSIS — N82.3 RECTOVAGINAL FISTULA: ICD-10-CM

## 2019-10-21 DIAGNOSIS — E03.9 ACQUIRED HYPOTHYROIDISM: ICD-10-CM

## 2019-10-21 DIAGNOSIS — Z00.00 ROUTINE HEALTH MAINTENANCE: ICD-10-CM

## 2019-10-21 DIAGNOSIS — R10.13 EPIGASTRIC ABDOMINAL PAIN: ICD-10-CM

## 2019-10-21 DIAGNOSIS — Z12.31 VISIT FOR SCREENING MAMMOGRAM: ICD-10-CM

## 2019-10-21 DIAGNOSIS — D73.4 CYST OF SPLEEN: ICD-10-CM

## 2019-10-21 DIAGNOSIS — E87.5 HYPERKALEMIA: Primary | ICD-10-CM

## 2019-10-21 DIAGNOSIS — I10 ESSENTIAL HYPERTENSION: Primary | ICD-10-CM

## 2019-10-21 DIAGNOSIS — H91.93 BILATERAL HEARING LOSS, UNSPECIFIED HEARING LOSS TYPE: ICD-10-CM

## 2019-10-21 PROCEDURE — 80053 COMPREHEN METABOLIC PANEL: CPT

## 2019-10-21 PROCEDURE — 80061 LIPID PANEL: CPT

## 2019-10-21 PROCEDURE — 85025 COMPLETE CBC W/AUTO DIFF WBC: CPT

## 2019-10-21 PROCEDURE — 84443 ASSAY THYROID STIM HORMONE: CPT

## 2019-10-21 PROCEDURE — G0463 HOSPITAL OUTPT CLINIC VISIT: HCPCS | Performed by: INTERNAL MEDICINE

## 2019-10-21 PROCEDURE — 36415 COLL VENOUS BLD VENIPUNCTURE: CPT

## 2019-10-21 PROCEDURE — 99214 OFFICE O/P EST MOD 30 MIN: CPT | Performed by: INTERNAL MEDICINE

## 2019-10-21 NOTE — TELEPHONE ENCOUNTER
Discussed with patient that she needs to get repeat blood work done within 24 hours (per Dr. Orlin Zazueta). Pt verbalized understanding. Pt stated she will go to [facility] for lab work on 10/22/19 at 7:30a.

## 2019-10-21 NOTE — TELEPHONE ENCOUNTER
Epi Pyle. Rodney Duran is doing well. She has no GI or  complaints. 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   She is very reluctant to undergo colonoscopy due to her fear of disrupting the rectovaginal fistula seco

## 2019-10-21 NOTE — TELEPHONE ENCOUNTER
Pt.states that she feels a pulling in the L eye, and she feels something in the eye, and she has Munieres Disease, so she is requesting to be seen sooner then 1st avail. ,

## 2019-10-21 NOTE — TELEPHONE ENCOUNTER
Spoke with patient. Appointment was scheduled on 10/24/19 with Dr. Lakisha Geronimo. Patient was diagnosed with Meniere's in August 2019. For the past 6 weeks, she has been having problems with her left eye; she says \"pulling\" of the left eye.     I advised p

## 2019-10-21 NOTE — PROGRESS NOTES
HPI:   Kat Morales is a 68year old female presents with the following problems. Patient feels well. She has no unusual complaints. She wishes not to pursue colonoscopy. She last had a colonoscopy August 2013.   She does have a history of a re will update thyroid function    She has hypertension. We will update labs. She has seen  for GERD    Last DEXA scan was in 2016. It was normal.  She wishes not to have DEXA scan this year and wishes to wait till next year.     Wt Readings from [facility] History:   Procedure Laterality Date   • ARTHROSCOPY KNEE LEFT Left 1/7/2016    Performed by True Mckeon MD at [address] [facility]      • CHOLECYSTECTOMY     • COLONOSCOPY  08/05/2013    Dr. Yuly Verma - repeat in 2023   • DEBR Value Ref Range    Free T4 1.07 0.58 - 1.64 ng/dL   VITAMIN D, 25-HYDROXY   Result Value Ref Range    Vitamin D, 25OH, Total 34.3 30.0 - 100.0 ng/mL   ALT (SGPT)   Result Value Ref Range    ALT 19 14 - 54 U/L   AST (SGOT)   Result Value Ref Range    AST 19 normal.  GI:  good BS's. no masses, organomegaly or tenderness   RECTAL: She had last colonoscopy August 2013. She also had a history of diverticulitis. 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   She wishes not to

## 2019-10-22 ENCOUNTER — APPOINTMENT (OUTPATIENT)
Dept: LAB | Age: 74
End: 2019-10-22
Attending: INTERNAL MEDICINE
Payer: MEDICARE

## 2019-10-22 PROCEDURE — 36415 COLL VENOUS BLD VENIPUNCTURE: CPT | Performed by: INTERNAL MEDICINE

## 2019-10-22 PROCEDURE — 80048 BASIC METABOLIC PNL TOTAL CA: CPT | Performed by: INTERNAL MEDICINE

## 2019-10-22 NOTE — TELEPHONE ENCOUNTER
To Dr. Mackenzie Fallon - per [facility] lab: STAT BMP results: no critical labs. Results in chart.

## 2019-10-22 NOTE — TELEPHONE ENCOUNTER
Noted.  Potassium 5.2. GFR 56. BUN 13 and creatinine 1.0.   Will let patient know results by end of day

## 2019-10-22 NOTE — TELEPHONE ENCOUNTER
Epi Bobo, I thought she was following with Dr. Florencio Lenz regarding this. I do recommend one more colonoscopy for follow up of diverticulitis and rule out malignancy.  However, it is also up to her whether to pursue it as there is potential for complications

## 2019-10-23 ENCOUNTER — TELEPHONE (OUTPATIENT)
Dept: INTERNAL MEDICINE CLINIC | Facility: CLINIC | Age: 74
End: 2019-10-23

## 2019-10-23 NOTE — TELEPHONE ENCOUNTER
I sent a telephone message just now to change patient's appointment but this was on another patient. You can disregard this if it went through to your telephone message basket. (  Note to self: Called patient and discussed that her potassium is better.   Bautista De La Fuente

## 2019-10-23 NOTE — TELEPHONE ENCOUNTER
Candice Galindo. Thank you. I will discuss with patient her options and rationale for colonoscopy recommendations.  Loraine Samuels

## 2019-10-24 ENCOUNTER — OFFICE VISIT (OUTPATIENT)
Dept: OPHTHALMOLOGY | Facility: CLINIC | Age: 74
End: 2019-10-24
Payer: COMMERCIAL

## 2019-10-24 DIAGNOSIS — H25.13 AGE-RELATED NUCLEAR CATARACT OF BOTH EYES: ICD-10-CM

## 2019-10-24 DIAGNOSIS — H43.391 VITREOUS FLOATERS OF RIGHT EYE: ICD-10-CM

## 2019-10-24 DIAGNOSIS — R73.03 PRE-DIABETES: ICD-10-CM

## 2019-10-24 DIAGNOSIS — H81.03 MENIERE'S DISEASE OF BOTH EARS: Primary | ICD-10-CM

## 2019-10-24 DIAGNOSIS — H40.003 GLAUCOMA SUSPECT OF BOTH EYES: ICD-10-CM

## 2019-10-24 PROCEDURE — 92004 COMPRE OPH EXAM NEW PT 1/>: CPT | Performed by: OPHTHALMOLOGY

## 2019-10-24 PROCEDURE — 92250 FUNDUS PHOTOGRAPHY W/I&R: CPT | Performed by: OPHTHALMOLOGY

## 2019-10-24 NOTE — ASSESSMENT & PLAN NOTE
Normal eye findings. Recommend neuro-ophthalmology at [facility] eye clinic- Dr. Souleymane Benavidez. Or patient could see neurology at [facility]- Dr. Zach Marinelli. Otherwise, she could go to [facility] neurology such as [facility], [facility] or [facility].     Patient

## 2019-10-24 NOTE — PROGRESS NOTES
Ramone Duong is a 68year old female.     HPI:     HPI     Consult      Additional comments: Consult per Dr. Maciel Bond              Diabetic Eye Exam      Additional comments: Pt has been a pre-diabetic for 2 years  0 years on pills/  0 years on Insulin • Rotator cuff tear, right    • Splenic cyst 4/7/2015   • Splenic cyst 12/23/2016   • Tinnitus    • Vitreous floaters of right eye 10/24/2019       Surgical History: Hari Cancino has a past surgical history that includes electrocardiogram, complete by mouth daily. , Disp: 90 tablet, Rfl: 3  Levothyroxine Sodium 112 MCG Oral Tab, Take 1 tablet (112 mcg total) by mouth before breakfast., Disp: 90 tablet, Rfl: 3  Meclizine HCl 12.5 MG Oral Tab, Take 1 tablet (12.5 mg total) by mouth daily. , Disp: 30 tabl for: Neurological, Eyes    Negative for: Constitutional, Gastrointestinal, Skin, Genitourinary, Musculoskeletal, HENT, Endocrine, Cardiovascular, Respiratory, Psychiatric, Allergic/Imm, Heme/Lymph    Last edited by Peterson Wilson OT on 10/24/2019  8:29 AM systemic benefits of blood sugar control. Diagnosis and treatment discussed in detail with patient. Meniere's disease of both ears  Normal eye findings. Recommend neuro-ophthalmology at [facility] eye clinic- Dr. Milton Ganser.    Or patient could see ne

## 2019-10-24 NOTE — PATIENT INSTRUCTIONS
Pre-diabetes  Pre- diabetes: no background of retinopathy, no signs of neovascularization noted. Discussed ocular and systemic benefits of blood sugar control. Diagnosis and treatment discussed in detail with patient.       Meniere's disease of both ears threads, or spider webs moving through your eyesight. Most people see them once in a while. Floaters may be pieces of gel or other material floating inside your eye. They are usually harmless. Who gets flashes?   As you age or if you are nearsighted, y slowly builds up. This causes gradual loss of side (peripheral) vision. You may not even notice changes until much of your vision is lost.  Closed-angle glaucoma  Closed-angle glaucoma is less common than open-angle. It usually comes on quickly.  The draina

## 2019-10-24 NOTE — ASSESSMENT & PLAN NOTE
Pre- diabetes: no background of retinopathy, no signs of neovascularization noted. Discussed ocular and systemic benefits of blood sugar control. Diagnosis and treatment discussed in detail with patient.

## 2019-10-25 ENCOUNTER — OFFICE VISIT (OUTPATIENT)
Dept: INTERNAL MEDICINE CLINIC | Facility: CLINIC | Age: 74
End: 2019-10-25
Payer: COMMERCIAL

## 2019-10-25 VITALS — SYSTOLIC BLOOD PRESSURE: 126 MMHG | HEART RATE: 66 BPM | DIASTOLIC BLOOD PRESSURE: 70 MMHG | OXYGEN SATURATION: 98 %

## 2019-10-25 DIAGNOSIS — R00.2 PALPITATIONS: ICD-10-CM

## 2019-10-25 DIAGNOSIS — R94.4 DECREASED GFR: ICD-10-CM

## 2019-10-25 DIAGNOSIS — E87.5 HYPERKALEMIA: ICD-10-CM

## 2019-10-25 DIAGNOSIS — I10 ESSENTIAL HYPERTENSION: Primary | ICD-10-CM

## 2019-10-25 DIAGNOSIS — E03.9 ACQUIRED HYPOTHYROIDISM: ICD-10-CM

## 2019-10-25 DIAGNOSIS — H81.09 MENIERE'S DISEASE, UNSPECIFIED LATERALITY: ICD-10-CM

## 2019-10-25 PROCEDURE — 99214 OFFICE O/P EST MOD 30 MIN: CPT | Performed by: INTERNAL MEDICINE

## 2019-10-25 PROCEDURE — G0463 HOSPITAL OUTPT CLINIC VISIT: HCPCS | Performed by: INTERNAL MEDICINE

## 2019-10-25 PROCEDURE — 93005 ELECTROCARDIOGRAM TRACING: CPT | Performed by: INTERNAL MEDICINE

## 2019-10-25 PROCEDURE — 93010 ELECTROCARDIOGRAM REPORT: CPT | Performed by: INTERNAL MEDICINE

## 2019-10-25 RX ORDER — MECLIZINE HCL 12.5 MG/1
12.5 TABLET ORAL 3 TIMES DAILY PRN
Qty: 90 TABLET | Refills: 3 | Status: SHIPPED | OUTPATIENT
Start: 2019-10-25

## 2019-10-25 NOTE — PROGRESS NOTES
Hari Cancino is a 68year old female. HPI:   Patient presents with:   Follow - Up: pt here for f/u visit     Patient related that we called patient with her recent labs and potassium 5.8 that about 2 hours later she had an episode of palpitations that 90 tablet, Rfl: 3  famoTIDine 20 MG Oral Tab, Take 20 mg by mouth 2 (two) times daily. , Disp: , Rfl:   Metoprolol Succinate ER 25 MG Oral Tablet 24 Hr, Take 1 tablet (25 mg total) by mouth daily. , Disp: 90 tablet, Rfl: 3  Levothyroxine Sodium 112 MCG Oral cough  CARDIOVASCULAR:  Voices no chest pain on exertion or shortness of breath  GI:   Voices no abdominal pain or changes of bowels   :Viices no urning or frequency of urination.   NEURO:  Voices no  headaches or dizziness    EXAM:   /70   Pulse 66 plan.    Nestor Goodpasture, MD  10/25/2019  4:34 PM

## 2019-10-28 ENCOUNTER — HOSPITAL ENCOUNTER (OUTPATIENT)
Dept: ULTRASOUND IMAGING | Age: 74
Discharge: HOME | End: 2019-10-28
Attending: INTERNAL MEDICINE
Payer: MEDICARE

## 2019-10-28 ENCOUNTER — TELEPHONE (OUTPATIENT)
Dept: INTERNAL MEDICINE CLINIC | Facility: CLINIC | Age: 74
End: 2019-10-28

## 2019-10-28 DIAGNOSIS — D73.4 CYST OF SPLEEN: ICD-10-CM

## 2019-10-28 PROCEDURE — 76705 ECHO EXAM OF ABDOMEN: CPT | Performed by: INTERNAL MEDICINE

## 2019-10-28 NOTE — TELEPHONE ENCOUNTER
[facility]aj faxed Prior Authorization for Meclizine 12.5 mg   Plan does not cover medication  Please call plan at [phone]  Patient ID# [id number]    - fax in purple folder

## 2019-10-29 ENCOUNTER — TELEPHONE (OUTPATIENT)
Dept: INTERNAL MEDICINE CLINIC | Facility: CLINIC | Age: 74
End: 2019-10-29

## 2019-10-29 NOTE — TELEPHONE ENCOUNTER
Please let patient know that her splenic ultrasound came out good. The cyst has not changed in size. Nothing more to do for now.

## 2019-11-07 ENCOUNTER — HOSPITAL ENCOUNTER (OUTPATIENT)
Dept: MAMMOGRAPHY | Age: 74
Discharge: HOME | End: 2019-11-07
Attending: INTERNAL MEDICINE
Payer: MEDICARE

## 2019-11-07 ENCOUNTER — TELEPHONE (OUTPATIENT)
Dept: INTERNAL MEDICINE CLINIC | Facility: CLINIC | Age: 74
End: 2019-11-07

## 2019-11-07 DIAGNOSIS — Z12.31 VISIT FOR SCREENING MAMMOGRAM: ICD-10-CM

## 2019-11-07 PROCEDURE — 77063 BREAST TOMOSYNTHESIS BI: CPT | Performed by: INTERNAL MEDICINE

## 2019-11-07 PROCEDURE — 77067 SCR MAMMO BI INCL CAD: CPT | Performed by: INTERNAL MEDICINE

## 2019-11-19 ENCOUNTER — NURSE ONLY (OUTPATIENT)
Dept: OPHTHALMOLOGY | Facility: CLINIC | Age: 74
End: 2019-11-19
Payer: COMMERCIAL

## 2019-11-19 DIAGNOSIS — H40.003 GLAUCOMA SUSPECT OF BOTH EYES: ICD-10-CM

## 2019-11-19 PROCEDURE — 92133 CPTRZD OPH DX IMG PST SGM ON: CPT | Performed by: OPHTHALMOLOGY

## 2019-11-19 PROCEDURE — 92083 EXTENDED VISUAL FIELD XM: CPT | Performed by: OPHTHALMOLOGY

## 2019-11-19 PROCEDURE — 76514 ECHO EXAM OF EYE THICKNESS: CPT | Performed by: OPHTHALMOLOGY

## 2019-11-19 NOTE — PROGRESS NOTES
Beverley Steele is a 69year old female.     HPI:     HPI     Pt is here for a VF, OCT and PACHY with no MD.     Last edited by Chi Resendiz OT on 11/19/2019  8:00 AM. (History)        Patient History:  Past Medical History:   Diagnosis Date   • Acute m LEFT;  Surgeon: Tony Gilliam MD;  Location: [address] [place] [address]); colonoscopy (2013) (Dr. Jose Armando Dey - repeat in ); ; tonsillectomy; other surgical history (Carpal Tunnel both hands) (Left knee torn meniscus); other (thumb surgery); and to Rate             Other reaction(s): CLINDAMYCIN  Keflex [Cephalexin]     SWELLING  Latex                       Comment:Other reaction(s): LATEX             Other reaction(s): LATEX  Levofloxacin                Comment:Other reaction(s): LEVOFLOXACIN

## 2019-12-10 ENCOUNTER — OFFICE VISIT (OUTPATIENT)
Dept: INTERNAL MEDICINE CLINIC | Facility: CLINIC | Age: 74
End: 2019-12-10
Payer: COMMERCIAL

## 2019-12-10 ENCOUNTER — OFFICE VISIT (OUTPATIENT)
Dept: PHYSICAL THERAPY | Age: 74
End: 2019-12-10
Attending: INTERNAL MEDICINE
Payer: MEDICARE

## 2019-12-10 ENCOUNTER — TELEPHONE (OUTPATIENT)
Dept: INTERNAL MEDICINE CLINIC | Facility: CLINIC | Age: 74
End: 2019-12-10

## 2019-12-10 ENCOUNTER — LAB ENCOUNTER (OUTPATIENT)
Dept: LAB | Age: 74
End: 2019-12-10
Attending: INTERNAL MEDICINE
Payer: MEDICARE

## 2019-12-10 ENCOUNTER — HOSPITAL ENCOUNTER (OUTPATIENT)
Dept: GENERAL RADIOLOGY | Age: 74
Discharge: HOME | End: 2019-12-10
Attending: INTERNAL MEDICINE
Payer: MEDICARE

## 2019-12-10 VITALS
BODY MASS INDEX: 34 KG/M2 | OXYGEN SATURATION: 99 % | HEART RATE: 68 BPM | HEIGHT: 63 IN | DIASTOLIC BLOOD PRESSURE: 74 MMHG | TEMPERATURE: 98 F | SYSTOLIC BLOOD PRESSURE: 140 MMHG

## 2019-12-10 DIAGNOSIS — E87.5 HYPERKALEMIA: ICD-10-CM

## 2019-12-10 DIAGNOSIS — M54.31 SCIATICA OF RIGHT SIDE: ICD-10-CM

## 2019-12-10 DIAGNOSIS — I10 ESSENTIAL HYPERTENSION: ICD-10-CM

## 2019-12-10 DIAGNOSIS — R39.9 LOWER URINARY TRACT SYMPTOMS: Primary | ICD-10-CM

## 2019-12-10 DIAGNOSIS — R94.4 DECREASED GFR: ICD-10-CM

## 2019-12-10 DIAGNOSIS — M54.41 ACUTE BACK PAIN WITH SCIATICA, RIGHT: Primary | ICD-10-CM

## 2019-12-10 DIAGNOSIS — E03.9 ACQUIRED HYPOTHYROIDISM: ICD-10-CM

## 2019-12-10 PROCEDURE — 36415 COLL VENOUS BLD VENIPUNCTURE: CPT | Performed by: INTERNAL MEDICINE

## 2019-12-10 PROCEDURE — 81001 URINALYSIS AUTO W/SCOPE: CPT | Performed by: INTERNAL MEDICINE

## 2019-12-10 PROCEDURE — 97110 THERAPEUTIC EXERCISES: CPT | Performed by: PHYSICAL THERAPIST

## 2019-12-10 PROCEDURE — G0463 HOSPITAL OUTPT CLINIC VISIT: HCPCS | Performed by: INTERNAL MEDICINE

## 2019-12-10 PROCEDURE — 87086 URINE CULTURE/COLONY COUNT: CPT

## 2019-12-10 PROCEDURE — 87088 URINE BACTERIA CULTURE: CPT

## 2019-12-10 PROCEDURE — 87186 SC STD MICRODIL/AGAR DIL: CPT

## 2019-12-10 PROCEDURE — 72110 X-RAY EXAM L-2 SPINE 4/>VWS: CPT | Performed by: INTERNAL MEDICINE

## 2019-12-10 PROCEDURE — 97162 PT EVAL MOD COMPLEX 30 MIN: CPT | Performed by: PHYSICAL THERAPIST

## 2019-12-10 PROCEDURE — 80048 BASIC METABOLIC PNL TOTAL CA: CPT | Performed by: INTERNAL MEDICINE

## 2019-12-10 PROCEDURE — 99214 OFFICE O/P EST MOD 30 MIN: CPT | Performed by: INTERNAL MEDICINE

## 2019-12-10 NOTE — PROGRESS NOTES
Denise Bergman is a 69year old female. HPI:   Patient presents with:  Low Back Pain: Right side radiating down leg x 2 weeks   Urinary Frequency: urgency, hematuria x 1 day      Yesterday patient started to have some frequency. She had to go a lot. mouth daily. Past Medical History:   Diagnosis Date   • Acute meniscal tear of left knee 1/5/2016    MRI done by Marlena Harris shows small joint effusion and moderate-sized Baker's cyst and complex medial meniscal tear.   MRI done December 9, 2015    • A distress  SKIN:  no rashes , no suspicious lesions in area of right buttock. EYES:  PERRL. Sclera anicteric. NECK:  Supple,  no adenopathy,    LUNGS:  clear to auscultation. Effort normal  CARDIO:  RRR without murmur.    S1 and S2 normal  GI:  good BS's,

## 2019-12-10 NOTE — TELEPHONE ENCOUNTER
The arthritis in the spine can pinch the sciatic nerve thus giving sciatica.   We would need to do a little bit more investigation with MRI scan of the lumbar spine to get more detail of the lumbar anatomy but I am thinking that it is the lumbar arthritis t

## 2019-12-10 NOTE — PROGRESS NOTES
SPINE EVALUATION:   Referring Physician: Dr. Gordy Griffin  Diagnosis: LBP   Date of Service: 12/10/2019     PATIENT SUMMARY   Alessio White is a 69year old female who presents to therapy today with complaints of pain at R side back/buttock pain starting in with pain)  Flexion: Mod LOM - I/W buttock pain  Extension: Mod LOM  Sidebending: WNL  Rotation: Min LOM R/L  Repeated movements:  Baseline standing R buttock, back pain. Repeated flexion I/W buttock/back. Repeated extension - no pain.     Palpation: ten be seen for 2 x/week or a total of 8-12 visits over a 90 day period.  Treatment will include: Manual Therapy, Therapeutic Exercise, Home Exercise Program instruction and Modalities to include: Electrical stimulation (unattended)    Education or treatment li

## 2019-12-10 NOTE — TELEPHONE ENCOUNTER
Please let patient know that her x-ray showed some rather advanced arthritis which I think she knew about. This may be contributing to her pain. I note that she saw BODØ from physical therapy already. Her potassium blood test was good.   Her urinalys

## 2019-12-10 NOTE — TELEPHONE ENCOUNTER
Mailed Order to pt & routed to [facility] to check insurance / authorization

## 2019-12-10 NOTE — TELEPHONE ENCOUNTER
Spoke to patient and relayed MD message. Patient verbalized understanding and agrees with plan. Dr. Abbey Prince- Pt was surprised that her pain may be r/t arthritis and wanted RN to clarify if MD believes her back pain is associated with sciatica at all.  Gretta West

## 2019-12-11 ENCOUNTER — TELEPHONE (OUTPATIENT)
Dept: INTERNAL MEDICINE CLINIC | Facility: CLINIC | Age: 74
End: 2019-12-11

## 2019-12-11 ENCOUNTER — APPOINTMENT (OUTPATIENT)
Dept: PHYSICAL THERAPY | Age: 74
End: 2019-12-11
Attending: INTERNAL MEDICINE
Payer: MEDICARE

## 2019-12-11 DIAGNOSIS — R82.90 ABNORMAL URINALYSIS: Primary | ICD-10-CM

## 2019-12-11 RX ORDER — AMOXICILLIN 500 MG/1
500 CAPSULE ORAL 3 TIMES DAILY
Qty: 21 CAPSULE | Refills: 0 | Status: SHIPPED | OUTPATIENT
Start: 2019-12-11 | End: 2019-12-15

## 2019-12-11 NOTE — TELEPHONE ENCOUNTER
Labs 12/10 reviweed; UA with 32 WBCs; UCx with 10-50K E Coli; pt had reporred lower urinary sxs at OV 12/11/19    Imp- UTI ;Rec- advise Abx;  Patient not available; LMKATHRYN; LMTCB; FYI to Dr Mary Will

## 2019-12-12 NOTE — TELEPHONE ENCOUNTER
Please call patient early and let her know that the sensitivities of her urine culture came back and the bacteria is resistant to the amoxicillin we thought would help her and was called in yesterday. Please tell her not to take amoxicillin.   Hopefully sh

## 2019-12-12 NOTE — TELEPHONE ENCOUNTER
Labs 12/10 reviweed; UA with 32 WBCs; UCx with 10-50K E Coli; sensitivity pending; pt had reporred lower urinary sxs at OV 12/11/19     Imp- UTI ; has multiple drug allergies; ;Rec- amoxicillin 500 mg po TID x 7d; pt called; ERx sent

## 2019-12-12 NOTE — TELEPHONE ENCOUNTER
Pt is calling she is now having tingling, she is leaving she has errands to run  Please call pt ok to lvm   Tasked to nursing

## 2019-12-12 NOTE — TELEPHONE ENCOUNTER
Please advise - called patient who states she did not  medication yet , she has no SX anymore, had blood in urine on Monday - she states she is very sensitive with medications - to DR. DOBSON

## 2019-12-12 NOTE — TELEPHONE ENCOUNTER
Pt. Is returning call she doesn't have a fever her temp is 97.7 ph.  # [phone] ok to leave message pt. won't be home until 2:30pm routed to clinical

## 2019-12-12 NOTE — TELEPHONE ENCOUNTER
To Dr. Drea Azevedo - pt states she did have tingling at urethral opening this AM, not now. Pt states she is OK now. Pt thought she was getting nauseated but not now.

## 2019-12-13 ENCOUNTER — OFFICE VISIT (OUTPATIENT)
Dept: PHYSICAL THERAPY | Age: 74
End: 2019-12-13
Attending: INTERNAL MEDICINE
Payer: MEDICARE

## 2019-12-13 PROCEDURE — 97110 THERAPEUTIC EXERCISES: CPT | Performed by: PHYSICAL THERAPIST

## 2019-12-13 RX ORDER — FOSFOMYCIN TROMETHAMINE 3 G/1
3 GRANULE, FOR SOLUTION ORAL ONCE
Qty: 3 G | Refills: 0 | COMMUNITY
Start: 2019-12-13 | End: 2020-03-03

## 2019-12-13 NOTE — TELEPHONE ENCOUNTER
I spoke with patient and she says she spoke with Dr. Darla Kanner last night about a new medication. She says she is not having any symptoms. To Dr. Darla Kanner.

## 2019-12-13 NOTE — TELEPHONE ENCOUNTER
Thanks for your request,     We will work on this as soon as we can and call patient once approved     Thanks,     [facility]

## 2019-12-15 NOTE — TELEPHONE ENCOUNTER
Please call patient to see how she is doing after her antibiotic. Please let her know I thought it would be a good idea to repeat urinalysis and urine culture. She can go some time this week. Order in place.

## 2019-12-17 ENCOUNTER — OFFICE VISIT (OUTPATIENT)
Dept: PHYSICAL THERAPY | Age: 74
End: 2019-12-17
Attending: INTERNAL MEDICINE
Payer: MEDICARE

## 2019-12-17 PROCEDURE — 97110 THERAPEUTIC EXERCISES: CPT | Performed by: PHYSICAL THERAPIST

## 2019-12-17 NOTE — PROGRESS NOTES
Dx: Sciatica R side         Insurance (Authorized # of Visits):  2           Authorizing Physician: Dr. Gio Winston  Next MD visit: none scheduled  Fall Risk: standard         Precautions: n/a             Subjective: Patient reports the pain has been much less

## 2019-12-20 ENCOUNTER — APPOINTMENT (OUTPATIENT)
Dept: PHYSICAL THERAPY | Age: 74
End: 2019-12-20
Attending: INTERNAL MEDICINE
Payer: MEDICARE

## 2019-12-20 NOTE — TELEPHONE ENCOUNTER
Though the sensitivities may be showing resistance, amoxicillin still may work to clear the infection, we will not know this without her repeating the sample, encouraged her to do this after she is off the antibiotics for 3 days.   To clarify, urine culture

## 2019-12-20 NOTE — TELEPHONE ENCOUNTER
Please call Monday and have her go for urine and C/S as follow up.  I do not think she ever took the fosfomycin

## 2019-12-20 NOTE — TELEPHONE ENCOUNTER
LMTCB.    mychart sent to pt    To dr. Marta Mccann we have been unable to reach pt by phone after multiple attempts

## 2019-12-24 ENCOUNTER — OFFICE VISIT (OUTPATIENT)
Dept: PHYSICAL THERAPY | Age: 74
End: 2019-12-24
Attending: INTERNAL MEDICINE
Payer: MEDICARE

## 2019-12-24 PROCEDURE — 97110 THERAPEUTIC EXERCISES: CPT | Performed by: PHYSICAL THERAPIST

## 2019-12-24 PROCEDURE — 97035 APP MDLTY 1+ULTRASOUND EA 15: CPT | Performed by: PHYSICAL THERAPIST

## 2019-12-24 NOTE — PROGRESS NOTES
Dx: Sciatica R side         Insurance (Authorized # of Visits):  4           Authorizing Physician: Dr. Amelia Trent MD visit: none scheduled  Fall Risk: standard         Precautions: n/a             Subjective: Patient reports overall she is continuing to HEP: HIp abd/ext/flex RTB, sustained extension if able    Charges: Ex3, U/S       Total Timed Treatment: 45 min  Total Treatment Time: 45 min

## 2019-12-27 ENCOUNTER — OFFICE VISIT (OUTPATIENT)
Dept: PHYSICAL THERAPY | Age: 74
End: 2019-12-27
Attending: INTERNAL MEDICINE
Payer: MEDICARE

## 2019-12-27 PROCEDURE — 97110 THERAPEUTIC EXERCISES: CPT | Performed by: PHYSICAL THERAPIST

## 2019-12-27 NOTE — TELEPHONE ENCOUNTER
Spoke to patient and relayed MD message and instructions, patient verbalizes understanding and agrees with plan. Patient states she will go for the urine tests after the New Year. She denies any symptoms currently.  She was accompanying her brother to get h

## 2019-12-27 NOTE — PROGRESS NOTES
Dx: Sciatica R side         Insurance (Authorized # of Visits):  4           Authorizing Physician: Dr. Sunil Lam  Next MD visit: none scheduled  Fall Risk: standard         Precautions: n/a             Subjective: Patient reports she is able to go upstairs n

## 2019-12-31 ENCOUNTER — APPOINTMENT (OUTPATIENT)
Dept: LAB | Age: 74
End: 2019-12-31
Attending: INTERNAL MEDICINE
Payer: MEDICARE

## 2019-12-31 ENCOUNTER — OFFICE VISIT (OUTPATIENT)
Dept: PHYSICAL THERAPY | Age: 74
End: 2019-12-31
Attending: INTERNAL MEDICINE
Payer: MEDICARE

## 2019-12-31 LAB
BACTERIA UR QL AUTO: NEGATIVE /HPF
BILIRUB UR QL: NEGATIVE
CLARITY UR: CLEAR
COLOR UR: YELLOW
GLUCOSE UR-MCNC: NEGATIVE MG/DL
HGB UR QL STRIP.AUTO: NEGATIVE
HYALINE CASTS #/AREA URNS AUTO: 2 /LPF
KETONES UR-MCNC: NEGATIVE MG/DL
NITRITE UR QL STRIP.AUTO: NEGATIVE
PH UR: 5 [PH] (ref 5–8)
PROT UR-MCNC: NEGATIVE MG/DL
RBC #/AREA URNS AUTO: 2 /HPF
SP GR UR STRIP: 1.02 (ref 1–1.03)
UROBILINOGEN UR STRIP-ACNC: <2
WBC #/AREA URNS AUTO: 2 /HPF

## 2019-12-31 PROCEDURE — 97110 THERAPEUTIC EXERCISES: CPT | Performed by: PHYSICAL THERAPIST

## 2019-12-31 PROCEDURE — 81001 URINALYSIS AUTO W/SCOPE: CPT | Performed by: INTERNAL MEDICINE

## 2019-12-31 PROCEDURE — 87086 URINE CULTURE/COLONY COUNT: CPT | Performed by: INTERNAL MEDICINE

## 2019-12-31 NOTE — PROGRESS NOTES
Dx: Sciatica R side         Insurance (Authorized # of Visits):  10          Authorizing Physician: Dr. Gio Winston  Next MD visit: none scheduled  Fall Risk: standard         Precautions: n/a             Subjective: Patient reports she has been good during the

## 2020-01-02 ENCOUNTER — APPOINTMENT (OUTPATIENT)
Dept: GENERAL RADIOLOGY | Facility: HOSPITAL | Age: 75
End: 2020-01-02
Attending: EMERGENCY MEDICINE
Payer: MEDICARE

## 2020-01-02 ENCOUNTER — HOSPITAL ENCOUNTER (EMERGENCY)
Facility: HOSPITAL | Age: 75
Discharge: HOME | End: 2020-01-02
Attending: EMERGENCY MEDICINE
Payer: MEDICARE

## 2020-01-02 ENCOUNTER — TELEPHONE (OUTPATIENT)
Dept: INTERNAL MEDICINE CLINIC | Facility: CLINIC | Age: 75
End: 2020-01-02

## 2020-01-02 VITALS
SYSTOLIC BLOOD PRESSURE: 150 MMHG | RESPIRATION RATE: 18 BRPM | BODY MASS INDEX: 35 KG/M2 | TEMPERATURE: 98 F | DIASTOLIC BLOOD PRESSURE: 94 MMHG | WEIGHT: 200 LBS | HEART RATE: 65 BPM | OXYGEN SATURATION: 95 %

## 2020-01-02 DIAGNOSIS — S83.91XA SPRAIN OF RIGHT KNEE, UNSPECIFIED LIGAMENT, INITIAL ENCOUNTER: Primary | ICD-10-CM

## 2020-01-02 PROCEDURE — 73562 X-RAY EXAM OF KNEE 3: CPT | Performed by: EMERGENCY MEDICINE

## 2020-01-02 PROCEDURE — 99283 EMERGENCY DEPT VISIT LOW MDM: CPT

## 2020-01-02 NOTE — ED PROVIDER NOTES
Patient Seen in: [facility] AND [facility] Emergency Department      History   Patient presents with:  Fall    Stated Complaint: fall    HPI    Patient is a 58-year-old female she states yesterday she was going down the stairs and tripped on the edge of her pant left great toenail   • ELECTROCARDIOGRAM, COMPLETE  03-    Scan to media tab 03-   • ELECTROCARDIOGRAM, COMPLETE  12-    Scan to media tab 12-   • ELECTROCARDIOGRAM, COMPLETE  12-    Scan to media tab 12-   • KEV normal heart sounds and intact distal pulses. Pulmonary/Chest: Effort normal and breath sounds normal. No respiratory distress. Abdominal: Soft. Bowel sounds are normal. Exhibits no distension and no mass. There is no tenderness.  There is no rebound a

## 2020-01-02 NOTE — ED INITIAL ASSESSMENT (HPI)
Pt reports she was coming down the carpeted steps and tripped over her pant leg causing her to slide down the steps on her backside. Pt now c/o right knee pain, back pain, right shoulder pain. Injury happened 24 hrs ago.

## 2020-01-03 ENCOUNTER — APPOINTMENT (OUTPATIENT)
Dept: PHYSICAL THERAPY | Age: 75
End: 2020-01-03
Attending: INTERNAL MEDICINE
Payer: MEDICARE

## 2020-01-06 ENCOUNTER — TELEPHONE (OUTPATIENT)
Dept: INTERNAL MEDICINE CLINIC | Facility: CLINIC | Age: 75
End: 2020-01-06

## 2020-01-06 NOTE — TELEPHONE ENCOUNTER
Called patient and relayed DR. DOBSON message - verbalized understanding, Number for ADRIANO Kurtz given , patient states the knee feels better ( she fell)

## 2020-01-06 NOTE — TELEPHONE ENCOUNTER
Please notify patient that I was made aware she was in the emergency room with knee pain. The report on her x-ray did show that she has a little bit of fluid in her knee of a mild degree. It may be cruz for her to see orthopedics.   Please ask her if she

## 2020-01-07 ENCOUNTER — OFFICE VISIT (OUTPATIENT)
Dept: PHYSICAL THERAPY | Age: 75
End: 2020-01-07
Attending: INTERNAL MEDICINE
Payer: MEDICARE

## 2020-01-07 DIAGNOSIS — M54.31 SCIATICA OF RIGHT SIDE: ICD-10-CM

## 2020-01-07 NOTE — PROGRESS NOTES
Dx: Sciatica R side         Insurance (Authorized # of Visits):  6/10          Authorizing Physician: Dr. Sophia Crain  Next MD visit: none scheduled  Fall Risk: standard         Precautions: n/a             Subjective: Patient reports she fell on Sunday going d

## 2020-01-10 ENCOUNTER — OFFICE VISIT (OUTPATIENT)
Dept: PHYSICAL THERAPY | Age: 75
End: 2020-01-10
Attending: INTERNAL MEDICINE
Payer: MEDICARE

## 2020-01-10 DIAGNOSIS — M54.31 SCIATICA OF RIGHT SIDE: ICD-10-CM

## 2020-01-10 PROCEDURE — 97140 MANUAL THERAPY 1/> REGIONS: CPT | Performed by: PHYSICAL THERAPIST

## 2020-01-10 PROCEDURE — 97014 ELECTRIC STIMULATION THERAPY: CPT | Performed by: PHYSICAL THERAPIST

## 2020-01-10 NOTE — PROGRESS NOTES
Dx: Sciatica R side         Insurance (Authorized # of Visits):  7/10          Authorizing Physician: Dr. Mackenzie Fallon  Next MD visit: none scheduled  Fall Risk: standard         Precautions: n/a             Subjective: Patient in and reports did see MD and landy

## 2020-01-17 ENCOUNTER — OFFICE VISIT (OUTPATIENT)
Dept: PHYSICAL THERAPY | Age: 75
End: 2020-01-17
Attending: INTERNAL MEDICINE
Payer: MEDICARE

## 2020-01-17 DIAGNOSIS — M54.31 SCIATICA OF RIGHT SIDE: ICD-10-CM

## 2020-01-17 PROCEDURE — 97035 APP MDLTY 1+ULTRASOUND EA 15: CPT | Performed by: PHYSICAL THERAPIST

## 2020-01-17 PROCEDURE — 97140 MANUAL THERAPY 1/> REGIONS: CPT | Performed by: PHYSICAL THERAPIST

## 2020-01-17 PROCEDURE — 97014 ELECTRIC STIMULATION THERAPY: CPT | Performed by: PHYSICAL THERAPIST

## 2020-01-17 NOTE — PROGRESS NOTES
Dx: Sciatica R side         Insurance (Authorized # of Visits):  7/10          Authorizing Physician: Dr. Ana Garcia  Next MD visit: none scheduled  Fall Risk: standard         Precautions: n/a             Subjective: Patient reports the MD said no tear and di

## 2020-01-20 ENCOUNTER — OFFICE VISIT (OUTPATIENT)
Dept: PHYSICAL THERAPY | Age: 75
End: 2020-01-20
Attending: INTERNAL MEDICINE
Payer: MEDICARE

## 2020-01-20 DIAGNOSIS — M54.31 SCIATICA OF RIGHT SIDE: ICD-10-CM

## 2020-01-20 PROCEDURE — 97110 THERAPEUTIC EXERCISES: CPT | Performed by: PHYSICAL THERAPIST

## 2020-01-20 PROCEDURE — 97035 APP MDLTY 1+ULTRASOUND EA 15: CPT | Performed by: PHYSICAL THERAPIST

## 2020-01-20 PROCEDURE — 97140 MANUAL THERAPY 1/> REGIONS: CPT | Performed by: PHYSICAL THERAPIST

## 2020-01-20 NOTE — PROGRESS NOTES
Dx: Sciatica R side         Insurance (Authorized # of Visits):  7/7          Authorizing Physician: Dr. Susanna Smith  Next MD visit: none scheduled  Fall Risk: standard         Precautions: n/a             Subjective: Patient reports she is no able to do stair

## 2020-01-24 ENCOUNTER — OFFICE VISIT (OUTPATIENT)
Dept: PHYSICAL THERAPY | Age: 75
End: 2020-01-24
Attending: INTERNAL MEDICINE
Payer: MEDICARE

## 2020-01-24 DIAGNOSIS — M54.31 SCIATICA OF RIGHT SIDE: ICD-10-CM

## 2020-01-24 PROCEDURE — 97014 ELECTRIC STIMULATION THERAPY: CPT | Performed by: PHYSICAL THERAPIST

## 2020-01-24 PROCEDURE — 97035 APP MDLTY 1+ULTRASOUND EA 15: CPT | Performed by: PHYSICAL THERAPIST

## 2020-01-24 PROCEDURE — 97140 MANUAL THERAPY 1/> REGIONS: CPT | Performed by: PHYSICAL THERAPIST

## 2020-01-24 NOTE — PROGRESS NOTES
Dx: Sciatica R side         Insurance (Authorized # of Visits):  7/7          Authorizing Physician: Dr. Abraham Luis  Next MD visit: none scheduled  Fall Risk: standard         Precautions: n/a             Subjective: Patient reports she only has some pain at

## 2020-01-28 ENCOUNTER — OFFICE VISIT (OUTPATIENT)
Dept: PHYSICAL THERAPY | Age: 75
End: 2020-01-28
Attending: INTERNAL MEDICINE
Payer: MEDICARE

## 2020-01-28 DIAGNOSIS — M54.31 SCIATICA OF RIGHT SIDE: ICD-10-CM

## 2020-01-28 PROCEDURE — 97014 ELECTRIC STIMULATION THERAPY: CPT | Performed by: PHYSICAL THERAPIST

## 2020-01-28 PROCEDURE — 97035 APP MDLTY 1+ULTRASOUND EA 15: CPT | Performed by: PHYSICAL THERAPIST

## 2020-01-28 PROCEDURE — 97140 MANUAL THERAPY 1/> REGIONS: CPT | Performed by: PHYSICAL THERAPIST

## 2020-01-28 NOTE — PROGRESS NOTES
Dx: Sciatica R side         Insurance (Authorized # of Visits):  7/7          Authorizing Physician: Dr. David Carpenter  Next MD visit: none scheduled  Fall Risk: standard         Precautions: n/a             Subjective: Patient reports pain 1-2 only with steps a

## 2020-02-04 ENCOUNTER — APPOINTMENT (OUTPATIENT)
Dept: PHYSICAL THERAPY | Age: 75
End: 2020-02-04
Attending: INTERNAL MEDICINE
Payer: MEDICARE

## 2020-02-11 ENCOUNTER — OFFICE VISIT (OUTPATIENT)
Dept: PHYSICAL THERAPY | Age: 75
End: 2020-02-11
Attending: INTERNAL MEDICINE
Payer: MEDICARE

## 2020-02-11 ENCOUNTER — TELEPHONE (OUTPATIENT)
Dept: INTERNAL MEDICINE CLINIC | Facility: CLINIC | Age: 75
End: 2020-02-11

## 2020-02-11 DIAGNOSIS — G89.29 CHRONIC PAIN OF RIGHT KNEE: Primary | ICD-10-CM

## 2020-02-11 DIAGNOSIS — M54.31 SCIATICA OF RIGHT SIDE: ICD-10-CM

## 2020-02-11 DIAGNOSIS — M25.561 CHRONIC PAIN OF RIGHT KNEE: Primary | ICD-10-CM

## 2020-02-11 PROCEDURE — 97035 APP MDLTY 1+ULTRASOUND EA 15: CPT | Performed by: PHYSICAL THERAPIST

## 2020-02-11 PROCEDURE — 97140 MANUAL THERAPY 1/> REGIONS: CPT | Performed by: PHYSICAL THERAPIST

## 2020-02-11 PROCEDURE — 97032 APPL MODALITY 1+ESTIM EA 15: CPT | Performed by: PHYSICAL THERAPIST

## 2020-02-11 NOTE — TELEPHONE ENCOUNTER
Order for physical therapy in place. However please let patient know that if her knee continues to hurt it would not be unreasonable for her to see orthopedics.   She did have a knee x-ray when she was in the emergency room January 2 showing a little bit o

## 2020-02-11 NOTE — TELEPHONE ENCOUNTER
Pt like in order for physical therapy for her right knee now. Her back is doing great so the lest 5 visits that she have left like them to be for her right knee her apt is this Friday.

## 2020-02-11 NOTE — TELEPHONE ENCOUNTER
Spoke to patient and relayed MD message, patient verbalized understanding. Patient reports she did see ortho Dr. Shahzad Thomas and had an MRI done and was told shots could possibly help. She states her knee pain only comes at certain times.  Per patient the PT Josh

## 2020-02-11 NOTE — PROGRESS NOTES
Dx: Sciatica R side         Insurance (Authorized # of Visits):  7/10 2020, total 15          Authorizing Physician: Dr. Carlos Fish  Next MD visit: none scheduled  Fall Risk: standard         Precautions: n/a     DISCHARGE SUMMARY          Subjective: Patient

## 2020-02-14 ENCOUNTER — OFFICE VISIT (OUTPATIENT)
Dept: PHYSICAL THERAPY | Age: 75
End: 2020-02-14
Attending: INTERNAL MEDICINE
Payer: MEDICARE

## 2020-02-14 DIAGNOSIS — M25.561 CHRONIC PAIN OF RIGHT KNEE: ICD-10-CM

## 2020-02-14 DIAGNOSIS — G89.29 CHRONIC PAIN OF RIGHT KNEE: ICD-10-CM

## 2020-02-14 PROCEDURE — 97162 PT EVAL MOD COMPLEX 30 MIN: CPT | Performed by: PHYSICAL THERAPIST

## 2020-02-14 PROCEDURE — 97035 APP MDLTY 1+ULTRASOUND EA 15: CPT | Performed by: PHYSICAL THERAPIST

## 2020-02-14 PROCEDURE — 97014 ELECTRIC STIMULATION THERAPY: CPT | Performed by: PHYSICAL THERAPIST

## 2020-02-14 NOTE — PROGRESS NOTES
LOWER EXTREMITY EVALUATION:   Referring Physician: Dr. Moran Deamelissa  Diagnosis: R knee pain     Date of Service: 2/14/2020     PATIENT SUMMARY   Jose M Wall is a 69year old female who presents to therapy today with complaints of pain at R knee followin Flexibility:  Hip Flexor: WNL  Hamstrings: min restriction  Piriformis: nt  Quads: R minimal restrcition  Gastroc-soleus:min restriction    Strength/MMT: (* denotes performed with pain)  Hip Knee Foot/Ankle   Flexion: R5*/5; L 5/5  Extension: R 4+/5; was advised of these findings, precautions, and treatment options and has agreed to actively participate in planning and for this course of care.     Thank you for your referral. Please co-sign or sign and return this letter via fax as soon as possible to 3

## 2020-02-18 ENCOUNTER — OFFICE VISIT (OUTPATIENT)
Dept: PHYSICAL THERAPY | Age: 75
End: 2020-02-18
Attending: INTERNAL MEDICINE
Payer: MEDICARE

## 2020-02-18 DIAGNOSIS — M25.561 CHRONIC PAIN OF RIGHT KNEE: ICD-10-CM

## 2020-02-18 DIAGNOSIS — G89.29 CHRONIC PAIN OF RIGHT KNEE: ICD-10-CM

## 2020-02-18 PROCEDURE — 97014 ELECTRIC STIMULATION THERAPY: CPT | Performed by: PHYSICAL THERAPIST

## 2020-02-18 PROCEDURE — 97035 APP MDLTY 1+ULTRASOUND EA 15: CPT | Performed by: PHYSICAL THERAPIST

## 2020-02-18 NOTE — PROGRESS NOTES
Dx: R knee pain        Insurance (Authorized # of Visits): 2          Authorizing Physician: Dr. Gurpreet Obrien  Next MD visit: none scheduled  Fall Risk: standard         Precautions: n/a             Subjective: Patient reports she only has 25 minutes as has to g

## 2020-02-21 ENCOUNTER — OFFICE VISIT (OUTPATIENT)
Dept: PHYSICAL THERAPY | Age: 75
End: 2020-02-21
Attending: INTERNAL MEDICINE
Payer: MEDICARE

## 2020-02-21 DIAGNOSIS — G89.29 CHRONIC PAIN OF RIGHT KNEE: ICD-10-CM

## 2020-02-21 DIAGNOSIS — M25.561 CHRONIC PAIN OF RIGHT KNEE: ICD-10-CM

## 2020-02-21 PROCEDURE — 97035 APP MDLTY 1+ULTRASOUND EA 15: CPT | Performed by: PHYSICAL THERAPIST

## 2020-02-21 PROCEDURE — 97110 THERAPEUTIC EXERCISES: CPT | Performed by: PHYSICAL THERAPIST

## 2020-02-21 NOTE — PROGRESS NOTES
Dx: R knee pain        Insurance (Authorized # of Visits): 3          Authorizing Physician: Dr. Lucrecia Trent MD visit: none scheduled  Fall Risk: standard         Precautions: n/a             Subjective: Patient reports the knee has no pain today, has bee

## 2020-02-25 ENCOUNTER — PATIENT MESSAGE (OUTPATIENT)
Dept: INTERNAL MEDICINE CLINIC | Facility: CLINIC | Age: 75
End: 2020-02-25

## 2020-02-25 ENCOUNTER — OFFICE VISIT (OUTPATIENT)
Dept: PHYSICAL THERAPY | Age: 75
End: 2020-02-25
Attending: INTERNAL MEDICINE
Payer: MEDICARE

## 2020-02-25 DIAGNOSIS — M25.561 CHRONIC PAIN OF RIGHT KNEE: ICD-10-CM

## 2020-02-25 DIAGNOSIS — G89.29 CHRONIC PAIN OF RIGHT KNEE: ICD-10-CM

## 2020-02-25 PROCEDURE — 97035 APP MDLTY 1+ULTRASOUND EA 15: CPT | Performed by: PHYSICAL THERAPIST

## 2020-02-25 PROCEDURE — 97110 THERAPEUTIC EXERCISES: CPT | Performed by: PHYSICAL THERAPIST

## 2020-02-25 NOTE — TELEPHONE ENCOUNTER
From: Clarisa Ascencio  To: Ann Marie Boles MD  Sent: 2/25/2020 7:44 AM CST  Subject: Non-Urgent Medical Question    Good morning Dr. Amelia Crowe! First thank you for the therapy permissions. I have 2 more left and definitely on the road to recovery.  Feeling li

## 2020-02-25 NOTE — PROGRESS NOTES
Dx: R knee pain        Insurance (Authorized # of Visits): 4          Authorizing Physician: Dr. Jacki Bhatt  Next MD visit: none scheduled  Fall Risk: standard         Precautions: n/a             Subjective: Patient reports the knee has no pain in the day, so

## 2020-02-28 ENCOUNTER — OFFICE VISIT (OUTPATIENT)
Dept: PHYSICAL THERAPY | Age: 75
End: 2020-02-28
Attending: INTERNAL MEDICINE
Payer: MEDICARE

## 2020-02-28 DIAGNOSIS — G89.29 CHRONIC PAIN OF RIGHT KNEE: ICD-10-CM

## 2020-02-28 DIAGNOSIS — M25.561 CHRONIC PAIN OF RIGHT KNEE: ICD-10-CM

## 2020-02-28 PROCEDURE — 97110 THERAPEUTIC EXERCISES: CPT | Performed by: PHYSICAL THERAPIST

## 2020-02-28 PROCEDURE — 97035 APP MDLTY 1+ULTRASOUND EA 15: CPT | Performed by: PHYSICAL THERAPIST

## 2020-02-28 RX ORDER — METOPROLOL SUCCINATE 25 MG/1
TABLET, EXTENDED RELEASE ORAL
Qty: 90 TABLET | Refills: 3 | OUTPATIENT
Start: 2020-02-28

## 2020-02-28 RX ORDER — LEVOTHYROXINE SODIUM 112 UG/1
TABLET ORAL
Qty: 90 TABLET | Refills: 3 | OUTPATIENT
Start: 2020-02-28

## 2020-02-28 NOTE — TELEPHONE ENCOUNTER
Refill request has failed the Ambulatory Medication Refill Standing Order and is routed to the primary physician to review the following:    Requested Prescriptions     Refused Prescriptions Disp Refills   • LEVOTHYROXINE SODIUM 112 MCG Oral Tab [Pharmacy

## 2020-02-28 NOTE — PROGRESS NOTES
Dx: R knee pain        Insurance (Authorized # of Visits): 11         Authorizing Physician: Dr. Maciel Trent MD visit: none scheduled  Fall Risk: standard         Precautions: n/a             Subjective: Patient reports has no pain at knee now, is cautious

## 2020-03-01 RX ORDER — METOPROLOL SUCCINATE 25 MG/1
25 TABLET, EXTENDED RELEASE ORAL DAILY
Qty: 90 TABLET | Refills: 3 | Status: SHIPPED | OUTPATIENT
Start: 2020-03-01 | End: 2021-02-02

## 2020-03-01 RX ORDER — LEVOTHYROXINE SODIUM 112 UG/1
112 TABLET ORAL
Qty: 90 TABLET | Refills: 3 | Status: SHIPPED | OUTPATIENT
Start: 2020-03-01 | End: 2020-11-30

## 2020-03-02 ENCOUNTER — TELEPHONE (OUTPATIENT)
Dept: INTERNAL MEDICINE CLINIC | Facility: CLINIC | Age: 75
End: 2020-03-02

## 2020-03-02 NOTE — TELEPHONE ENCOUNTER
Spoke with pt - appt made for 7 AM tomorrow with Dr. Robert Wiley. Pt will be masked.     To Dr. Robert Wiley, [facility]

## 2020-03-02 NOTE — TELEPHONE ENCOUNTER
To Dr. Veena Dueñas - see below. \"Headache was so bad I was a taking tylenol - splitting headache\", headache has resolved - just hurts when I cough. Just took Robitussin for cough, too soon for results. .    No fever, but did have 99.5 last night. Denies SOB.

## 2020-03-02 NOTE — TELEPHONE ENCOUNTER
Pt got sick yesterday very bad chills cough and headache. The headache went away but she still feels very cold. Her cough is worse.

## 2020-03-03 ENCOUNTER — OFFICE VISIT (OUTPATIENT)
Dept: INTERNAL MEDICINE CLINIC | Facility: CLINIC | Age: 75
End: 2020-03-03
Payer: COMMERCIAL

## 2020-03-03 VITALS
WEIGHT: 200 LBS | HEART RATE: 67 BPM | DIASTOLIC BLOOD PRESSURE: 62 MMHG | OXYGEN SATURATION: 97 % | BODY MASS INDEX: 35 KG/M2 | SYSTOLIC BLOOD PRESSURE: 118 MMHG | RESPIRATION RATE: 14 BRPM | TEMPERATURE: 98 F

## 2020-03-03 DIAGNOSIS — J40 BRONCHITIS: Primary | ICD-10-CM

## 2020-03-03 PROCEDURE — 99213 OFFICE O/P EST LOW 20 MIN: CPT | Performed by: INTERNAL MEDICINE

## 2020-03-03 PROCEDURE — G0463 HOSPITAL OUTPT CLINIC VISIT: HCPCS | Performed by: INTERNAL MEDICINE

## 2020-03-03 RX ORDER — PREDNISONE 10 MG/1
TABLET ORAL
Qty: 7 TABLET | Refills: 0 | Status: SHIPPED | OUTPATIENT
Start: 2020-03-03 | End: 2020-11-30 | Stop reason: ALTCHOICE

## 2020-03-03 NOTE — PROGRESS NOTES
Aiden Aiken is a 69year old female. Patient presents with:  Cough: Pt reports she was at a wedding shower on Sunday and since then has had sore throat, cough, congestion and chills. Denies travel abroad.        HPI:   Aiden Aiken is a 69 year ol reflux    • Fatty liver    • Foreign body in foot, left 2010    Foreign body left heel, Hallux valgus, sharp debridement, removal of a piece glass   • Glaucoma suspect of both eyes 10/24/2019   • Hearing loss 8/19/2013   • HTN (hypertension)    • Hypothyro Neg       Social History:   Social History    Tobacco Use      Smoking status: Former Smoker        Packs/day: 0.00        Types: Cigarettes      Smokeless tobacco: Never Used    Alcohol use: Yes      Comment: Vodka, 2 drinks, yearly    Drug use:  No

## 2020-03-11 ENCOUNTER — TELEPHONE (OUTPATIENT)
Dept: INTERNAL MEDICINE CLINIC | Facility: CLINIC | Age: 75
End: 2020-03-11

## 2020-03-11 DIAGNOSIS — R05.9 COUGH: Primary | ICD-10-CM

## 2020-03-11 NOTE — TELEPHONE ENCOUNTER
The cough can linger sometimes and can take a few weeks to completely resolve. I would still recommend the cough suppression with cepacol lozenges, robitussin, voice rest, warm tea, and sleeping sitting in.  If she is still concerned, can get a chest xray t

## 2020-03-11 NOTE — TELEPHONE ENCOUNTER
Spoke with Frances Bai. She saw Dr Robert Wiley 3/3  She feels better than she did last week but not as better as she thinks she should be. She completed the prednisone. She still has a bad cough. Is bringing up yellow mucus. Feels tired. No recent fever.  No trouble b

## 2020-03-11 NOTE — TELEPHONE ENCOUNTER
Please call pt  Was seen last week and diagnosed with bronchitis  Pt still not feeling better  Please call to discuss/advise  Tasked to nursing

## 2020-03-12 ENCOUNTER — HOSPITAL ENCOUNTER (OUTPATIENT)
Dept: GENERAL RADIOLOGY | Age: 75
Discharge: HOME | End: 2020-03-12
Attending: INTERNAL MEDICINE
Payer: MEDICARE

## 2020-03-12 ENCOUNTER — TELEPHONE (OUTPATIENT)
Dept: INTERNAL MEDICINE CLINIC | Facility: CLINIC | Age: 75
End: 2020-03-12

## 2020-03-12 DIAGNOSIS — R05.9 COUGH: ICD-10-CM

## 2020-03-12 PROCEDURE — 71046 X-RAY EXAM CHEST 2 VIEWS: CPT | Performed by: INTERNAL MEDICINE

## 2020-03-12 NOTE — TELEPHONE ENCOUNTER
Please notify patient that her xray does not show pneumonia. Would recommend still sleeping propped up and cough suppression.

## 2020-06-25 ENCOUNTER — TELEPHONE (OUTPATIENT)
Dept: INTERNAL MEDICINE CLINIC | Facility: CLINIC | Age: 75
End: 2020-06-25

## 2020-06-25 ENCOUNTER — TELEPHONE (OUTPATIENT)
Dept: CASE MANAGEMENT | Age: 75
End: 2020-06-25

## 2020-06-25 NOTE — TELEPHONE ENCOUNTER
Pt called, she is feeling better now  Energy is back, headache gone, fatigue went away  No call back is needed at this time  Pt will call back if she does not feel well

## 2020-06-25 NOTE — TELEPHONE ENCOUNTER
I spoke with patient. She has been eating and drinking normally. No nausea or vomiting or vomiting. Her head is bothering her and she feels weak. She feels tired and she slept last night. She took one tylenol this morning. Yesterday she was stressed with her yard. Yesterday she gardened with a long sleeve shirt and long pants. She felt overheated yesterday. She started bleeding from her mouth and nose and had to get out of the shower. This lasted 2 minutes. It was quick and then it stopped. Her head hurt so she took a tylenol. She is not on a blood thinner and not on aspirin. She took an extra 1/2 tablet of metoprolol last night because her head was pounding. She did not check her blood pressure. Her temperature was 97 last night. To Dr. Janes Zuleta, please advise.

## 2020-06-25 NOTE — TELEPHONE ENCOUNTER
Patient returned call, informed pt I was calling to schedule Medicare Wellness visit, states yesterday had episode of dizziness and a nosebleed. Transferred patient to triage nurse.

## 2020-06-25 NOTE — TELEPHONE ENCOUNTER
Patient is calling she has been under stress since April due to Roe Espino working her yard    [place] she experienced a mouth and nose bleed & diarrhea   She was very stressed yesterday, she did yard work and got over heated  Both of these symptoms stopped yesterday    She feels weak and tired this is not normal  She has a headache today, she took a Tylenol   Patient said she doesn't feel right she feels something is wrong    She has no temp    Please call [phone]

## 2020-06-25 NOTE — TELEPHONE ENCOUNTER
Message noted. Please let her know that I am not quite sure why she is having no symptoms. She can go to immediate care to be checked. I am not quite sure why she was \"bleeding from her mouth and nose\". If she wishes not to go to immediate care today I can see her tomorrow.

## 2020-06-25 NOTE — TELEPHONE ENCOUNTER
I spoke with patient and relayed Dr. Edward Cuenca message. She verbalized understanding. She declined immediate care because she is feeling fine now. She has to visit her mom tomorrow and declines an appointment tomorrow. Instructed patient to continue to monitor her symptoms and call back if she has any questions or concerns.

## 2020-06-30 ENCOUNTER — TELEPHONE (OUTPATIENT)
Dept: INTERNAL MEDICINE CLINIC | Facility: CLINIC | Age: 75
End: 2020-06-30

## 2020-06-30 DIAGNOSIS — R94.4 DECREASED GFR: ICD-10-CM

## 2020-06-30 DIAGNOSIS — I10 ESSENTIAL HYPERTENSION: Primary | ICD-10-CM

## 2020-06-30 DIAGNOSIS — E78.5 HYPERLIPIDEMIA, UNSPECIFIED HYPERLIPIDEMIA TYPE: ICD-10-CM

## 2020-06-30 NOTE — TELEPHONE ENCOUNTER
Has been experiencing her heart racing  Comes & goes since Saturday  Otherwise feels good    Asks if Dr Val Norton could order blood test to check her potassium & electrolytes  Would like to have done before she travels to [place] on Thursday    Please call pt to a

## 2020-06-30 NOTE — TELEPHONE ENCOUNTER
To Dr. Amelia Crowe - see below. \"whatever I had is going way\". Denies fever/chills/sob. Very active today with no sx. Want to get labs checked before leaving town.

## 2020-07-01 ENCOUNTER — APPOINTMENT (OUTPATIENT)
Dept: LAB | Age: 75
End: 2020-07-01
Attending: INTERNAL MEDICINE
Payer: MEDICARE

## 2020-07-01 LAB
ALBUMIN SERPL-MCNC: 3.8 G/DL (ref 3.4–5)
ALBUMIN/GLOB SERPL: 1.1 {RATIO} (ref 1–2)
ALP LIVER SERPL-CCNC: 65 U/L (ref 55–142)
ALT SERPL-CCNC: 25 U/L (ref 13–56)
ANION GAP SERPL CALC-SCNC: 7 MMOL/L (ref 0–18)
AST SERPL-CCNC: 17 U/L (ref 15–37)
BASOPHILS # BLD AUTO: 0.05 X10(3) UL (ref 0–0.2)
BASOPHILS NFR BLD AUTO: 0.6 %
BILIRUB SERPL-MCNC: 0.6 MG/DL (ref 0.1–2)
BUN BLD-MCNC: 12 MG/DL (ref 7–18)
BUN/CREAT SERPL: 14.3 (ref 10–20)
CALCIUM BLD-MCNC: 9.2 MG/DL (ref 8.5–10.1)
CHLORIDE SERPL-SCNC: 104 MMOL/L (ref 98–112)
CHOLEST SMN-MCNC: 166 MG/DL (ref ?–200)
CO2 SERPL-SCNC: 28 MMOL/L (ref 21–32)
CREAT BLD-MCNC: 0.84 MG/DL (ref 0.55–1.02)
DEPRECATED RDW RBC AUTO: 40.6 FL (ref 35.1–46.3)
EOSINOPHIL # BLD AUTO: 0.17 X10(3) UL (ref 0–0.7)
EOSINOPHIL NFR BLD AUTO: 1.9 %
ERYTHROCYTE [DISTWIDTH] IN BLOOD BY AUTOMATED COUNT: 12.8 % (ref 11–15)
GLOBULIN PLAS-MCNC: 3.4 G/DL (ref 2.8–4.4)
GLUCOSE BLD-MCNC: 91 MG/DL (ref 70–99)
HCT VFR BLD AUTO: 42.2 % (ref 35–48)
HDLC SERPL-MCNC: 51 MG/DL (ref 40–59)
HGB BLD-MCNC: 14 G/DL (ref 12–16)
IMM GRANULOCYTES # BLD AUTO: 0.04 X10(3) UL (ref 0–1)
IMM GRANULOCYTES NFR BLD: 0.4 %
LDLC SERPL CALC-MCNC: 97 MG/DL (ref ?–100)
LYMPHOCYTES # BLD AUTO: 1.6 X10(3) UL (ref 1–4)
LYMPHOCYTES NFR BLD AUTO: 17.8 %
M PROTEIN MFR SERPL ELPH: 7.2 G/DL (ref 6.4–8.2)
MCH RBC QN AUTO: 29 PG (ref 26–34)
MCHC RBC AUTO-ENTMCNC: 33.2 G/DL (ref 31–37)
MCV RBC AUTO: 87.6 FL (ref 80–100)
MONOCYTES # BLD AUTO: 0.66 X10(3) UL (ref 0.1–1)
MONOCYTES NFR BLD AUTO: 7.3 %
NEUTROPHILS # BLD AUTO: 6.49 X10 (3) UL (ref 1.5–7.7)
NEUTROPHILS # BLD AUTO: 6.49 X10(3) UL (ref 1.5–7.7)
NEUTROPHILS NFR BLD AUTO: 72 %
NONHDLC SERPL-MCNC: 115 MG/DL (ref ?–130)
OSMOLALITY SERPL CALC.SUM OF ELEC: 287 MOSM/KG (ref 275–295)
PATIENT FASTING Y/N/NP: YES
PATIENT FASTING Y/N/NP: YES
PLATELET # BLD AUTO: 332 10(3)UL (ref 150–450)
POTASSIUM SERPL-SCNC: 4.7 MMOL/L (ref 3.5–5.1)
RBC # BLD AUTO: 4.82 X10(6)UL (ref 3.8–5.3)
SODIUM SERPL-SCNC: 139 MMOL/L (ref 136–145)
T4 FREE SERPL-MCNC: 1.2 NG/DL (ref 0.8–1.7)
TRIGL SERPL-MCNC: 90 MG/DL (ref 30–149)
TSI SER-ACNC: 9.56 MIU/ML (ref 0.36–3.74)
VLDLC SERPL CALC-MCNC: 18 MG/DL (ref 0–30)
WBC # BLD AUTO: 9 X10(3) UL (ref 4–11)

## 2020-07-01 PROCEDURE — 80061 LIPID PANEL: CPT | Performed by: INTERNAL MEDICINE

## 2020-07-01 PROCEDURE — 36415 COLL VENOUS BLD VENIPUNCTURE: CPT | Performed by: INTERNAL MEDICINE

## 2020-07-01 PROCEDURE — 85025 COMPLETE CBC W/AUTO DIFF WBC: CPT | Performed by: INTERNAL MEDICINE

## 2020-07-01 PROCEDURE — 84439 ASSAY OF FREE THYROXINE: CPT | Performed by: INTERNAL MEDICINE

## 2020-07-01 PROCEDURE — 80053 COMPREHEN METABOLIC PANEL: CPT | Performed by: INTERNAL MEDICINE

## 2020-07-01 PROCEDURE — 84443 ASSAY THYROID STIM HORMONE: CPT | Performed by: INTERNAL MEDICINE

## 2020-07-02 ENCOUNTER — TELEPHONE (OUTPATIENT)
Dept: INTERNAL MEDICINE CLINIC | Facility: CLINIC | Age: 75
End: 2020-07-02

## 2020-07-02 DIAGNOSIS — E03.9 ACQUIRED HYPOTHYROIDISM: Primary | ICD-10-CM

## 2020-07-02 RX ORDER — LEVOTHYROXINE SODIUM 0.12 MG/1
125 TABLET ORAL
Qty: 30 TABLET | Refills: 11 | Status: SHIPPED | OUTPATIENT
Start: 2020-07-02 | End: 2020-12-08

## 2020-07-02 NOTE — TELEPHONE ENCOUNTER
Please notify patient that I thought her labs look good. Chemistries good. Potassium good. Kidneys good. Cholesterol good. One value that came out a little bit off as her thyroid.   It looks like she may need an increased dose of thyroid but I would fi

## 2020-07-02 NOTE — TELEPHONE ENCOUNTER
Message noted. We will go ahead and increase levothyroxine from 112 mcg to 125 mcg. We will recheck thyroid in 2 months or so.

## 2020-07-02 NOTE — TELEPHONE ENCOUNTER
Dr Sona Resendiz, patient is taking Levothyroxine 112 mcg correctly daily and has not skipped any doses. Patient is going out of town in an hour and will start new medication dose when she gets back in town on Monday or Tuesday next week.  Pharmacy is [facility] in

## 2020-07-15 ENCOUNTER — TELEPHONE (OUTPATIENT)
Dept: CASE MANAGEMENT | Age: 75
End: 2020-07-15

## 2020-07-15 NOTE — TELEPHONE ENCOUNTER
Patient is eligible for a 2020 Medicare Advantage Supervisit. Discussed in detail w/patient. Appt scheduled for 8/27/30.

## 2020-10-01 ENCOUNTER — TELEPHONE (OUTPATIENT)
Dept: CASE MANAGEMENT | Age: 75
End: 2020-10-01

## 2020-12-07 ENCOUNTER — TELEPHONE (OUTPATIENT)
Dept: INTERNAL MEDICINE CLINIC | Facility: CLINIC | Age: 75
End: 2020-12-07

## 2020-12-07 ENCOUNTER — APPOINTMENT (OUTPATIENT)
Dept: LAB | Age: 75
End: 2020-12-07
Attending: INTERNAL MEDICINE
Payer: MEDICARE

## 2020-12-07 PROCEDURE — 84443 ASSAY THYROID STIM HORMONE: CPT | Performed by: INTERNAL MEDICINE

## 2020-12-07 PROCEDURE — 36415 COLL VENOUS BLD VENIPUNCTURE: CPT | Performed by: INTERNAL MEDICINE

## 2020-12-07 PROCEDURE — 84439 ASSAY OF FREE THYROXINE: CPT | Performed by: INTERNAL MEDICINE

## 2020-12-08 RX ORDER — LEVOTHYROXINE SODIUM 0.12 MG/1
125 TABLET ORAL
Qty: 90 TABLET | Refills: 3 | Status: SHIPPED | OUTPATIENT
Start: 2020-12-08 | End: 2021-12-07

## 2020-12-08 NOTE — TELEPHONE ENCOUNTER
Dr. Shazia Main message relayed to pt who verbalized understanding. Rx refilled per pt request for 90 supply.     Refill request is for a maintenance medication and has met the criteria specified in the Ambulatory Medication Refill Standing Order for [facility]

## 2020-12-23 ENCOUNTER — TELEPHONE (OUTPATIENT)
Dept: INTERNAL MEDICINE CLINIC | Facility: CLINIC | Age: 75
End: 2020-12-23

## 2020-12-23 ENCOUNTER — HOSPITAL ENCOUNTER (OUTPATIENT)
Dept: MAMMOGRAPHY | Age: 75
Discharge: HOME | End: 2020-12-23
Attending: INTERNAL MEDICINE
Payer: MEDICARE

## 2020-12-23 DIAGNOSIS — Z12.31 ENCOUNTER FOR SCREENING MAMMOGRAM FOR BREAST CANCER: ICD-10-CM

## 2020-12-23 DIAGNOSIS — Z12.31 VISIT FOR SCREENING MAMMOGRAM: ICD-10-CM

## 2020-12-23 PROCEDURE — 77063 BREAST TOMOSYNTHESIS BI: CPT | Performed by: INTERNAL MEDICINE

## 2020-12-23 PROCEDURE — 77067 SCR MAMMO BI INCL CAD: CPT | Performed by: INTERNAL MEDICINE

## 2021-02-02 NOTE — TELEPHONE ENCOUNTER
Pt has not been seen for physical exam since 03/18/2018 (pt has been seen for acute issues only since then). Per TE 7/15/20 Pt was scheduled for MA- this appt was cancelled by pt. See TE 10/01/20, pt declined MA appt that was offered.  Pt needs to be schedu

## 2021-02-03 RX ORDER — METOPROLOL SUCCINATE 25 MG/1
25 TABLET, EXTENDED RELEASE ORAL DAILY
Qty: 90 TABLET | Refills: 3 | OUTPATIENT
Start: 2021-02-03

## 2021-02-03 RX ORDER — METOPROLOL SUCCINATE 25 MG/1
TABLET, EXTENDED RELEASE ORAL
Qty: 90 TABLET | Refills: 3 | OUTPATIENT
Start: 2021-02-03

## 2021-02-03 RX ORDER — METOPROLOL SUCCINATE 25 MG/1
25 TABLET, EXTENDED RELEASE ORAL DAILY
Qty: 90 TABLET | Refills: 3 | Status: SHIPPED | OUTPATIENT
Start: 2021-02-03 | End: 2022-01-18

## 2021-02-03 NOTE — TELEPHONE ENCOUNTER
Current refill request refused due to refill is either a duplicate request or has active refills at the pharmacy. Check previous templates.     Requested Prescriptions     Refused Prescriptions Disp Refills   • Metoprolol Succinate ER 25 MG Oral Tablet 24

## 2021-02-04 ENCOUNTER — PATIENT MESSAGE (OUTPATIENT)
Dept: INTERNAL MEDICINE CLINIC | Facility: CLINIC | Age: 76
End: 2021-02-04

## 2021-02-04 ENCOUNTER — TELEPHONE (OUTPATIENT)
Dept: INTERNAL MEDICINE CLINIC | Facility: CLINIC | Age: 76
End: 2021-02-04

## 2021-02-04 NOTE — TELEPHONE ENCOUNTER
[employer] message sent to patient with [employer] Covid vaccine guidelines. 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

## 2021-02-04 NOTE — TELEPHONE ENCOUNTER
From: Edyta Nicole  To: Jose Alberto Horner MD  Sent: 2/4/2021 12:11 PM CST  Subject: Non-Urgent Medical Question    Hi Dr. Dee Dee Gabriel! This is Jeannette Cooks. Is there any reason that I would not be eligible for the virus shot?  I have signed up 2 times but

## 2021-03-09 DIAGNOSIS — Z23 NEED FOR VACCINATION: ICD-10-CM

## 2021-03-13 ENCOUNTER — IMMUNIZATION (OUTPATIENT)
Dept: LAB | Facility: HOSPITAL | Age: 76
End: 2021-03-13
Attending: HOSPITALIST
Payer: MEDICARE

## 2021-03-13 DIAGNOSIS — Z23 NEED FOR VACCINATION: Primary | ICD-10-CM

## 2021-03-13 PROCEDURE — 0011A SARSCOV2 VAC 100MCG/0.5ML IM: CPT

## 2021-03-23 ENCOUNTER — OFFICE VISIT (OUTPATIENT)
Dept: INTERNAL MEDICINE CLINIC | Facility: CLINIC | Age: 76
End: 2021-03-23
Payer: COMMERCIAL

## 2021-03-23 VITALS
OXYGEN SATURATION: 98 % | HEART RATE: 72 BPM | HEIGHT: 63 IN | WEIGHT: 202 LBS | SYSTOLIC BLOOD PRESSURE: 130 MMHG | BODY MASS INDEX: 35.79 KG/M2 | DIASTOLIC BLOOD PRESSURE: 78 MMHG

## 2021-03-23 DIAGNOSIS — E78.5 HYPERLIPIDEMIA, UNSPECIFIED HYPERLIPIDEMIA TYPE: ICD-10-CM

## 2021-03-23 DIAGNOSIS — Z00.00 MEDICARE ANNUAL WELLNESS VISIT, SUBSEQUENT: Primary | ICD-10-CM

## 2021-03-23 DIAGNOSIS — N82.3 RECTOVAGINAL FISTULA: ICD-10-CM

## 2021-03-23 DIAGNOSIS — E03.9 ACQUIRED HYPOTHYROIDISM: ICD-10-CM

## 2021-03-23 DIAGNOSIS — I10 ESSENTIAL HYPERTENSION: ICD-10-CM

## 2021-03-23 DIAGNOSIS — Z00.00 ROUTINE HEALTH MAINTENANCE: ICD-10-CM

## 2021-03-23 PROCEDURE — 3078F DIAST BP <80 MM HG: CPT | Performed by: INTERNAL MEDICINE

## 2021-03-23 PROCEDURE — 99397 PER PM REEVAL EST PAT 65+ YR: CPT | Performed by: INTERNAL MEDICINE

## 2021-03-23 PROCEDURE — 3075F SYST BP GE 130 - 139MM HG: CPT | Performed by: INTERNAL MEDICINE

## 2021-03-23 PROCEDURE — G0439 PPPS, SUBSEQ VISIT: HCPCS | Performed by: INTERNAL MEDICINE

## 2021-03-23 PROCEDURE — 96160 PT-FOCUSED HLTH RISK ASSMT: CPT | Performed by: INTERNAL MEDICINE

## 2021-03-23 PROCEDURE — 3008F BODY MASS INDEX DOCD: CPT | Performed by: INTERNAL MEDICINE

## 2021-03-23 NOTE — PROGRESS NOTES
HPI:   Dodie Robert is a 77year old female presents with the following problems. Jeannie Montana comes in for Medicare annual wellness physical    She has hypertension. Blood pressure seems to be under adequate control.     She indicates that she did see  some Ménière issues and restless legs. She will get updated labs in July. Follow-up with me in 6 months. Family history shows that mother is alive at 80. She had breast carcinoma at 80. Father went to "[employer] and \"collapsed\" in a store.   He was i Meniere's disease    • Osteoarthritis of right hip 6/23/2016   • Pre-diabetes 10/20/2017   • Primary localized osteoarthrosis, shoulder region 6/20/2013    Log Date: 04/24/2013    • Rotator cuff tear, right    • Splenic cyst 4/7/2015   • Splenic cyst 12/23 Social History    Tobacco Use      Smoking status: Former Smoker        Packs/day: 0.00        Types: Cigarettes      Smokeless tobacco: Never Used    Vaping Use      Vaping Use: Never used    Alcohol use: Yes      Comment: Vodka, 2 drinks, yearly    Héctor immunizations at another office such as Influenza, Hepatitis B, Tetanus, or Pneumococcal?: No     Functional Ability     Bathing or Showering: Able without help    Toileting: Able without help    Dressing: Able without help    Eating: Able without help 12/23/2021      ASSESSMENT AND PLAN:         1. Medicare annual wellness visit, subsequent  Medicare annual wellness visit    2. Acquired hypothyroidism  On supplement. Asymptomatic. Thyroid levels in December 2020 were good    3.  Essential hypertension

## 2021-04-10 ENCOUNTER — IMMUNIZATION (OUTPATIENT)
Dept: LAB | Facility: HOSPITAL | Age: 76
End: 2021-04-10
Attending: EMERGENCY MEDICINE
Payer: MEDICARE

## 2021-04-10 DIAGNOSIS — Z23 NEED FOR VACCINATION: Primary | ICD-10-CM

## 2021-04-10 PROCEDURE — 0012A SARSCOV2 VAC 100MCG/0.5ML IM: CPT

## 2021-08-25 ENCOUNTER — LAB ENCOUNTER (OUTPATIENT)
Dept: LAB | Age: 76
End: 2021-08-25
Attending: INTERNAL MEDICINE
Payer: MEDICARE

## 2021-08-25 LAB
ALBUMIN SERPL-MCNC: 3.7 G/DL (ref 3.4–5)
ALBUMIN/GLOB SERPL: 1.1 {RATIO} (ref 1–2)
ALP LIVER SERPL-CCNC: 69 U/L
ALT SERPL-CCNC: 24 U/L
ANION GAP SERPL CALC-SCNC: 4 MMOL/L (ref 0–18)
AST SERPL-CCNC: 14 U/L (ref 15–37)
BASOPHILS # BLD AUTO: 0.06 X10(3) UL (ref 0–0.2)
BASOPHILS NFR BLD AUTO: 0.7 %
BILIRUB SERPL-MCNC: 0.6 MG/DL (ref 0.1–2)
BUN BLD-MCNC: 16 MG/DL (ref 7–18)
BUN/CREAT SERPL: 17.4 (ref 10–20)
CALCIUM BLD-MCNC: 9.2 MG/DL (ref 8.5–10.1)
CHLORIDE SERPL-SCNC: 107 MMOL/L (ref 98–112)
CHOLEST SMN-MCNC: 168 MG/DL (ref ?–200)
CO2 SERPL-SCNC: 28 MMOL/L (ref 21–32)
CREAT BLD-MCNC: 0.92 MG/DL
DEPRECATED RDW RBC AUTO: 40 FL (ref 35.1–46.3)
EOSINOPHIL # BLD AUTO: 0.21 X10(3) UL (ref 0–0.7)
EOSINOPHIL NFR BLD AUTO: 2.6 %
ERYTHROCYTE [DISTWIDTH] IN BLOOD BY AUTOMATED COUNT: 12.4 % (ref 11–15)
GLOBULIN PLAS-MCNC: 3.5 G/DL (ref 2.8–4.4)
GLUCOSE BLD-MCNC: 93 MG/DL (ref 70–99)
HCT VFR BLD AUTO: 42.8 %
HDLC SERPL-MCNC: 51 MG/DL (ref 40–59)
HGB BLD-MCNC: 13.7 G/DL
IMM GRANULOCYTES # BLD AUTO: 0.03 X10(3) UL (ref 0–1)
IMM GRANULOCYTES NFR BLD: 0.4 %
LDLC SERPL CALC-MCNC: 98 MG/DL (ref ?–100)
LYMPHOCYTES # BLD AUTO: 1.89 X10(3) UL (ref 1–4)
LYMPHOCYTES NFR BLD AUTO: 23.2 %
M PROTEIN MFR SERPL ELPH: 7.2 G/DL (ref 6.4–8.2)
MCH RBC QN AUTO: 28 PG (ref 26–34)
MCHC RBC AUTO-ENTMCNC: 32 G/DL (ref 31–37)
MCV RBC AUTO: 87.3 FL
MONOCYTES # BLD AUTO: 0.66 X10(3) UL (ref 0.1–1)
MONOCYTES NFR BLD AUTO: 8.1 %
NEUTROPHILS # BLD AUTO: 5.3 X10 (3) UL (ref 1.5–7.7)
NEUTROPHILS # BLD AUTO: 5.3 X10(3) UL (ref 1.5–7.7)
NEUTROPHILS NFR BLD AUTO: 65 %
NONHDLC SERPL-MCNC: 117 MG/DL (ref ?–130)
OSMOLALITY SERPL CALC.SUM OF ELEC: 289 MOSM/KG (ref 275–295)
PATIENT FASTING Y/N/NP: YES
PATIENT FASTING Y/N/NP: YES
PLATELET # BLD AUTO: 299 10(3)UL (ref 150–450)
POTASSIUM SERPL-SCNC: 5.1 MMOL/L (ref 3.5–5.1)
RBC # BLD AUTO: 4.9 X10(6)UL
SODIUM SERPL-SCNC: 139 MMOL/L (ref 136–145)
TRIGL SERPL-MCNC: 105 MG/DL (ref 30–149)
TSI SER-ACNC: 0.37 MIU/ML (ref 0.36–3.74)
VLDLC SERPL CALC-MCNC: 17 MG/DL (ref 0–30)
WBC # BLD AUTO: 8.2 X10(3) UL (ref 4–11)

## 2021-08-25 PROCEDURE — 80053 COMPREHEN METABOLIC PANEL: CPT | Performed by: INTERNAL MEDICINE

## 2021-08-25 PROCEDURE — 84443 ASSAY THYROID STIM HORMONE: CPT | Performed by: INTERNAL MEDICINE

## 2021-08-25 PROCEDURE — 85025 COMPLETE CBC W/AUTO DIFF WBC: CPT | Performed by: INTERNAL MEDICINE

## 2021-08-25 PROCEDURE — 36415 COLL VENOUS BLD VENIPUNCTURE: CPT | Performed by: INTERNAL MEDICINE

## 2021-08-25 PROCEDURE — 80061 LIPID PANEL: CPT | Performed by: INTERNAL MEDICINE

## 2021-08-31 ENCOUNTER — OFFICE VISIT (OUTPATIENT)
Dept: INTERNAL MEDICINE CLINIC | Facility: CLINIC | Age: 76
End: 2021-08-31
Payer: COMMERCIAL

## 2021-08-31 VITALS
OXYGEN SATURATION: 99 % | DIASTOLIC BLOOD PRESSURE: 68 MMHG | HEIGHT: 63 IN | HEART RATE: 62 BPM | TEMPERATURE: 98 F | BODY MASS INDEX: 36 KG/M2 | SYSTOLIC BLOOD PRESSURE: 128 MMHG

## 2021-08-31 DIAGNOSIS — N82.3 RECTOVAGINAL FISTULA: ICD-10-CM

## 2021-08-31 DIAGNOSIS — E78.5 HYPERLIPIDEMIA, UNSPECIFIED HYPERLIPIDEMIA TYPE: ICD-10-CM

## 2021-08-31 DIAGNOSIS — I10 ESSENTIAL HYPERTENSION: Primary | ICD-10-CM

## 2021-08-31 DIAGNOSIS — E03.9 ACQUIRED HYPOTHYROIDISM: ICD-10-CM

## 2021-08-31 DIAGNOSIS — Z00.00 ROUTINE HEALTH MAINTENANCE: ICD-10-CM

## 2021-08-31 DIAGNOSIS — Z12.11 COLON CANCER SCREENING: ICD-10-CM

## 2021-08-31 PROCEDURE — 3074F SYST BP LT 130 MM HG: CPT | Performed by: INTERNAL MEDICINE

## 2021-08-31 PROCEDURE — 99214 OFFICE O/P EST MOD 30 MIN: CPT | Performed by: INTERNAL MEDICINE

## 2021-08-31 PROCEDURE — 3078F DIAST BP <80 MM HG: CPT | Performed by: INTERNAL MEDICINE

## 2021-08-31 PROCEDURE — 3008F BODY MASS INDEX DOCD: CPT | Performed by: INTERNAL MEDICINE

## 2021-08-31 NOTE — PROGRESS NOTES
HPI:   Ramone Duong is a 77year old female presents with the following problems. Lyle Crowe comes in for follow-up. She feels well. She has hypertension. Blood pressure under good control    I did discuss with her her rectovaginal fistula.   She fee meniscal tear.   MRI done December 9, 2015    • Age-related nuclear cataract of both eyes 10/24/2019   • Baker's cyst 1/5/2016    Shah cyst noted on left knee by MRI scan December 9, 2015    • Displacement of lumbar intervertebral disc without myelopathy 4 Thyroid Disorder Mother         thyroid problems   • Kidney Disease Mother         Chronic kidney disease   • Cancer Mother         Cancer-breast   • Breast Cancer Mother 80   • Heart Disease Father         CAD, father passed away in [place], he had what so uL    Neutrophil Absolute 5.30 1.50 - 7.70 x10(3) uL    Lymphocyte Absolute 1.89 1.00 - 4.00 x10(3) uL    Monocyte Absolute 0.66 0.10 - 1.00 x10(3) uL    Eosinophil Absolute 0.21 0.00 - 0.70 x10(3) uL    Basophil Absolute 0.06 0.00 - 0.20 x10(3) uL    Nia AND PLAN:         1. Essential hypertension  Blood pressure under satisfactory control. 2. Hyperlipidemia, unspecified hyperlipidemia type  Lipids satisfactory. In the past she did not want to start statin therapy.     3. Rectovaginal fistula  Asymptoma

## 2021-12-07 RX ORDER — LEVOTHYROXINE SODIUM 0.12 MG/1
TABLET ORAL
Qty: 90 TABLET | Refills: 3 | Status: SHIPPED | OUTPATIENT
Start: 2021-12-07

## 2021-12-10 ENCOUNTER — PATIENT MESSAGE (OUTPATIENT)
Dept: INTERNAL MEDICINE CLINIC | Facility: CLINIC | Age: 76
End: 2021-12-10

## 2021-12-10 DIAGNOSIS — Z12.31 VISIT FOR SCREENING MAMMOGRAM: ICD-10-CM

## 2021-12-10 DIAGNOSIS — Z12.31 ENCOUNTER FOR SCREENING MAMMOGRAM FOR MALIGNANT NEOPLASM OF BREAST: Primary | ICD-10-CM

## 2021-12-10 NOTE — TELEPHONE ENCOUNTER
From: Sunday Angeles  To: Magnolia Rivas MD  Sent: 12/10/2021 10:02 AM CST  Subject: Mammogram    I'm receiving messages that I need a note to get a mammogram test. Thank you Dr. Keesha Murry

## 2021-12-28 ENCOUNTER — TELEPHONE (OUTPATIENT)
Dept: INTERNAL MEDICINE CLINIC | Facility: CLINIC | Age: 76
End: 2021-12-28

## 2021-12-28 NOTE — TELEPHONE ENCOUNTER
COVID triage:     Start of symptoms: 12/27, home Covid test done today, positive      Fever:  []  No fever  [x]  Temperature: 102 F forehead   []  Chills   []  Night sweats     Cough:  [x] Productive cough  [] Cough with exertion  [] Dry cough     Breathin

## 2021-12-28 NOTE — TELEPHONE ENCOUNTER
Patient is calling today she tested positive for covid on an at home test    She woke with a temp it has since gone away, she sore throat with phlegm and she feels tired    Is there anything she should be doing or taking?   Please call patient [phone]

## 2022-01-10 ENCOUNTER — TELEPHONE (OUTPATIENT)
Dept: INTERNAL MEDICINE CLINIC | Facility: CLINIC | Age: 77
End: 2022-01-10

## 2022-01-10 ENCOUNTER — HOSPITAL ENCOUNTER (EMERGENCY)
Facility: HOSPITAL | Age: 77
Discharge: HOME | End: 2022-01-10
Attending: EMERGENCY MEDICINE
Payer: MEDICARE

## 2022-01-10 ENCOUNTER — APPOINTMENT (OUTPATIENT)
Dept: CT IMAGING | Facility: HOSPITAL | Age: 77
End: 2022-01-10
Attending: EMERGENCY MEDICINE
Payer: MEDICARE

## 2022-01-10 VITALS
RESPIRATION RATE: 18 BRPM | HEART RATE: 82 BPM | DIASTOLIC BLOOD PRESSURE: 84 MMHG | WEIGHT: 203 LBS | SYSTOLIC BLOOD PRESSURE: 156 MMHG | OXYGEN SATURATION: 96 % | HEIGHT: 64 IN | TEMPERATURE: 97 F | BODY MASS INDEX: 34.66 KG/M2

## 2022-01-10 DIAGNOSIS — K57.92 ACUTE DIVERTICULITIS: Primary | ICD-10-CM

## 2022-01-10 LAB
ANION GAP SERPL CALC-SCNC: 7 MMOL/L (ref 0–18)
BASOPHILS # BLD AUTO: 0.05 X10(3) UL (ref 0–0.2)
BASOPHILS NFR BLD AUTO: 0.3 %
BUN BLD-MCNC: 16 MG/DL (ref 7–18)
BUN/CREAT SERPL: 21.3 (ref 10–20)
CALCIUM BLD-MCNC: 9.4 MG/DL (ref 8.5–10.1)
CHLORIDE SERPL-SCNC: 103 MMOL/L (ref 98–112)
CO2 SERPL-SCNC: 26 MMOL/L (ref 21–32)
CREAT BLD-MCNC: 0.75 MG/DL
DEPRECATED RDW RBC AUTO: 40.6 FL (ref 35.1–46.3)
EOSINOPHIL # BLD AUTO: 0.14 X10(3) UL (ref 0–0.7)
EOSINOPHIL NFR BLD AUTO: 0.8 %
ERYTHROCYTE [DISTWIDTH] IN BLOOD BY AUTOMATED COUNT: 13.2 % (ref 11–15)
GLUCOSE BLD-MCNC: 107 MG/DL (ref 70–99)
HCT VFR BLD AUTO: 41.1 %
HGB BLD-MCNC: 14.2 G/DL
IMM GRANULOCYTES # BLD AUTO: 0.08 X10(3) UL (ref 0–1)
IMM GRANULOCYTES NFR BLD: 0.5 %
LYMPHOCYTES # BLD AUTO: 1.79 X10(3) UL (ref 1–4)
LYMPHOCYTES NFR BLD AUTO: 10.4 %
MCH RBC QN AUTO: 29.3 PG (ref 26–34)
MCHC RBC AUTO-ENTMCNC: 34.5 G/DL (ref 31–37)
MCV RBC AUTO: 84.7 FL
MONOCYTES # BLD AUTO: 1.17 X10(3) UL (ref 0.1–1)
MONOCYTES NFR BLD AUTO: 6.8 %
NEUTROPHILS # BLD AUTO: 14.02 X10 (3) UL (ref 1.5–7.7)
NEUTROPHILS # BLD AUTO: 14.02 X10(3) UL (ref 1.5–7.7)
NEUTROPHILS NFR BLD AUTO: 81.2 %
OSMOLALITY SERPL CALC.SUM OF ELEC: 284 MOSM/KG (ref 275–295)
PLATELET # BLD AUTO: 373 10(3)UL (ref 150–450)
POTASSIUM SERPL-SCNC: 3.9 MMOL/L (ref 3.5–5.1)
RBC # BLD AUTO: 4.85 X10(6)UL
SODIUM SERPL-SCNC: 136 MMOL/L (ref 136–145)
WBC # BLD AUTO: 17.3 X10(3) UL (ref 4–11)

## 2022-01-10 PROCEDURE — 85025 COMPLETE CBC W/AUTO DIFF WBC: CPT | Performed by: EMERGENCY MEDICINE

## 2022-01-10 PROCEDURE — 74176 CT ABD & PELVIS W/O CONTRAST: CPT | Performed by: EMERGENCY MEDICINE

## 2022-01-10 PROCEDURE — 80048 BASIC METABOLIC PNL TOTAL CA: CPT | Performed by: EMERGENCY MEDICINE

## 2022-01-10 PROCEDURE — 36415 COLL VENOUS BLD VENIPUNCTURE: CPT

## 2022-01-10 PROCEDURE — 99284 EMERGENCY DEPT VISIT MOD MDM: CPT

## 2022-01-10 RX ORDER — AMOXICILLIN AND CLAVULANATE POTASSIUM 875; 125 MG/1; MG/1
1 TABLET, FILM COATED ORAL 2 TIMES DAILY
Qty: 14 TABLET | Refills: 0 | Status: SHIPPED | OUTPATIENT
Start: 2022-01-10 | End: 2022-01-17

## 2022-01-10 RX ORDER — SACCHAROMYCES BOULARDII 250 MG
250 CAPSULE ORAL 2 TIMES DAILY
Qty: 28 CAPSULE | Refills: 0 | Status: SHIPPED | OUTPATIENT
Start: 2022-01-10 | End: 2022-01-24

## 2022-01-10 NOTE — TELEPHONE ENCOUNTER
Spoke to pt. Reports she woke up 2-3 times last night with 9-10 out of 10 abdominal pain. She got up to go to the bathroom and had a small BM and noted mucus from the rectum. Denies any blood in stool. She was also sweating overnight.  Pt thought something

## 2022-01-10 NOTE — TELEPHONE ENCOUNTER
Pt is calling and states that woke up with pain in her stomach area. Sweating and in pain. The pain woke her up 2 or 3 times. Patient states that she has diverticulitis. Patient fears she has another fistula.   Wants to know if she should go to the dorita

## 2022-01-10 NOTE — TELEPHONE ENCOUNTER
Patient is calling after going to the ER and completing a CT scan. Patient was informed that it was in fact Diverticulitis. Patient was informed to schedule a follow up with Dr Brain Johnson. Patient is coming in on Thursday, 1/20/2022 at 1430. FYI to nursing.

## 2022-01-11 NOTE — TELEPHONE ENCOUNTER
[address] [facility] desk, I should see patient this Thursday or Friday in follow-up of her diverticulitis. Please call patient to arrange.   Thank you

## 2022-01-11 NOTE — TELEPHONE ENCOUNTER
[facility]. I do not see her on the list for Thursday. Please add.   Thank you

## 2022-01-11 NOTE — TELEPHONE ENCOUNTER
Patient was called per request below. Patient was added to Dr Cindi Anderson schedule this Thursday, 1/13/2022 at 0900.

## 2022-01-11 NOTE — TELEPHONE ENCOUNTER
TO FD: please see MD message below.  Patient has appt for next Thursday but MD would like to see her this Thursday or Friday

## 2022-01-13 ENCOUNTER — OFFICE VISIT (OUTPATIENT)
Dept: INTERNAL MEDICINE CLINIC | Facility: CLINIC | Age: 77
End: 2022-01-13
Payer: COMMERCIAL

## 2022-01-13 ENCOUNTER — LAB ENCOUNTER (OUTPATIENT)
Dept: LAB | Age: 77
End: 2022-01-13
Attending: INTERNAL MEDICINE
Payer: MEDICARE

## 2022-01-13 VITALS
WEIGHT: 199 LBS | TEMPERATURE: 97 F | OXYGEN SATURATION: 98 % | DIASTOLIC BLOOD PRESSURE: 80 MMHG | HEART RATE: 84 BPM | HEIGHT: 64 IN | SYSTOLIC BLOOD PRESSURE: 130 MMHG | BODY MASS INDEX: 33.97 KG/M2

## 2022-01-13 DIAGNOSIS — K57.92 DIVERTICULITIS: Primary | ICD-10-CM

## 2022-01-13 DIAGNOSIS — I10 ESSENTIAL HYPERTENSION: ICD-10-CM

## 2022-01-13 DIAGNOSIS — Z12.11 COLON CANCER SCREENING: ICD-10-CM

## 2022-01-13 DIAGNOSIS — N82.3 RECTOVAGINAL FISTULA: ICD-10-CM

## 2022-01-13 DIAGNOSIS — Z00.00 ROUTINE HEALTH MAINTENANCE: ICD-10-CM

## 2022-01-13 DIAGNOSIS — H53.9 VISION CHANGES: ICD-10-CM

## 2022-01-13 DIAGNOSIS — E03.9 ACQUIRED HYPOTHYROIDISM: ICD-10-CM

## 2022-01-13 DIAGNOSIS — Z86.16 HISTORY OF COVID-19: ICD-10-CM

## 2022-01-13 LAB
BASOPHILS # BLD AUTO: 0.05 X10(3) UL (ref 0–0.2)
BASOPHILS NFR BLD AUTO: 0.4 %
DEPRECATED RDW RBC AUTO: 41.5 FL (ref 35.1–46.3)
EOSINOPHIL # BLD AUTO: 0.16 X10(3) UL (ref 0–0.7)
EOSINOPHIL NFR BLD AUTO: 1.4 %
ERYTHROCYTE [DISTWIDTH] IN BLOOD BY AUTOMATED COUNT: 13.2 % (ref 11–15)
HCT VFR BLD AUTO: 43.1 %
HGB BLD-MCNC: 13.7 G/DL
IMM GRANULOCYTES # BLD AUTO: 0.05 X10(3) UL (ref 0–1)
IMM GRANULOCYTES NFR BLD: 0.4 %
LYMPHOCYTES # BLD AUTO: 1.39 X10(3) UL (ref 1–4)
LYMPHOCYTES NFR BLD AUTO: 12.2 %
MCH RBC QN AUTO: 27.3 PG (ref 26–34)
MCHC RBC AUTO-ENTMCNC: 31.8 G/DL (ref 31–37)
MCV RBC AUTO: 86 FL
MONOCYTES # BLD AUTO: 0.76 X10(3) UL (ref 0.1–1)
MONOCYTES NFR BLD AUTO: 6.7 %
NEUTROPHILS # BLD AUTO: 8.97 X10 (3) UL (ref 1.5–7.7)
NEUTROPHILS # BLD AUTO: 8.97 X10(3) UL (ref 1.5–7.7)
NEUTROPHILS NFR BLD AUTO: 78.9 %
PLATELET # BLD AUTO: 377 10(3)UL (ref 150–450)
RBC # BLD AUTO: 5.01 X10(6)UL
WBC # BLD AUTO: 11.4 X10(3) UL (ref 4–11)

## 2022-01-13 PROCEDURE — 3075F SYST BP GE 130 - 139MM HG: CPT | Performed by: INTERNAL MEDICINE

## 2022-01-13 PROCEDURE — 3008F BODY MASS INDEX DOCD: CPT | Performed by: INTERNAL MEDICINE

## 2022-01-13 PROCEDURE — 85025 COMPLETE CBC W/AUTO DIFF WBC: CPT | Performed by: INTERNAL MEDICINE

## 2022-01-13 PROCEDURE — 99214 OFFICE O/P EST MOD 30 MIN: CPT | Performed by: INTERNAL MEDICINE

## 2022-01-13 PROCEDURE — 3079F DIAST BP 80-89 MM HG: CPT | Performed by: INTERNAL MEDICINE

## 2022-01-13 PROCEDURE — 36415 COLL VENOUS BLD VENIPUNCTURE: CPT | Performed by: INTERNAL MEDICINE

## 2022-01-13 NOTE — PROGRESS NOTES
Jaydon Ojeda is a 77year old female. HPI:   Patient presents with:  Diverticulitis: pt here for diverticulitis f/u      Juanito  comes in for follow-up of diverticulitis. She was in the emergency room Monday with diverticulitis.   She started to have lo knee 1/5/2016    MRI done by [facility] shows small joint effusion and moderate-sized Baker's cyst and complex medial meniscal tear.   MRI done December 9, 2015    • Age-related nuclear cataract of both eyes 10/24/2019   • Baker's cyst 1/5/2016    Baker cyst PERRL. Sclera anicteric. NECK:  Supple,  no adenopathy,    LUNGS:  clear to auscultation. Effort normal  CARDIO:  RRR without murmur. S1 and S2 normal  GI:  good BS's,  no masses, some tenderness in the lower abdomen area midline and off to the left. on the 20th and we both agreed she probably will not need to follow-up with me that week. The patient indicates understanding of these issues and agrees to the plan.     Selena Banegas MD  1/13/2022  9:34 AM

## 2022-01-17 NOTE — TELEPHONE ENCOUNTER
Patient is calling with update per Dr. Hernandez Aid request.  Having no pain or discomfort. Bowel movements are okay. Going regularly in the morning. No discomfort with having bowel movement. Yesterday noticed she had a bit more energy. Is watching her diet.

## 2022-01-17 NOTE — TELEPHONE ENCOUNTER
I think for now no antibiotics are needed since there is no GI complaints. I believe she has a follow-up with Dr. Roberto Banuelos this week.     Please let her know that I would like to follow-up with her in 2 to 3 weeks to review with Dr. Roberto Banuelos indicate

## 2022-01-18 RX ORDER — METOPROLOL SUCCINATE 25 MG/1
TABLET, EXTENDED RELEASE ORAL
Qty: 90 TABLET | Refills: 3 | Status: SHIPPED | OUTPATIENT
Start: 2022-01-18

## 2022-01-19 ENCOUNTER — HOSPITAL ENCOUNTER (OUTPATIENT)
Dept: MAMMOGRAPHY | Age: 77
Discharge: HOME | End: 2022-01-19
Attending: INTERNAL MEDICINE
Payer: MEDICARE

## 2022-01-19 ENCOUNTER — HOSPITAL ENCOUNTER (OUTPATIENT)
Dept: GENERAL RADIOLOGY | Age: 77
Discharge: HOME | End: 2022-01-19
Attending: INTERNAL MEDICINE
Payer: MEDICARE

## 2022-01-19 DIAGNOSIS — R05.9 COUGH: ICD-10-CM

## 2022-01-19 DIAGNOSIS — Z12.31 VISIT FOR SCREENING MAMMOGRAM: ICD-10-CM

## 2022-01-19 DIAGNOSIS — Z12.31 ENCOUNTER FOR SCREENING MAMMOGRAM FOR MALIGNANT NEOPLASM OF BREAST: ICD-10-CM

## 2022-01-19 PROCEDURE — 77063 BREAST TOMOSYNTHESIS BI: CPT | Performed by: INTERNAL MEDICINE

## 2022-01-19 PROCEDURE — 71046 X-RAY EXAM CHEST 2 VIEWS: CPT | Performed by: INTERNAL MEDICINE

## 2022-01-19 PROCEDURE — 77067 SCR MAMMO BI INCL CAD: CPT | Performed by: INTERNAL MEDICINE

## 2022-01-20 ENCOUNTER — TELEPHONE (OUTPATIENT)
Dept: INTERNAL MEDICINE CLINIC | Facility: CLINIC | Age: 77
End: 2022-01-20

## 2022-01-20 RX ORDER — METOPROLOL SUCCINATE 25 MG/1
TABLET, EXTENDED RELEASE ORAL
Qty: 90 TABLET | Refills: 3 | OUTPATIENT
Start: 2022-01-20

## 2022-01-20 NOTE — TELEPHONE ENCOUNTER
Called patient with Dr. Gregorio Phillips message. Patient expressed understanding. Dr. Lupe Irwin said colonoscopy May 17. He also stopped florastor. To Dr. Sam Chambers.

## 2022-01-20 NOTE — TELEPHONE ENCOUNTER
Message noted. I think that if she is doing well and she just saw Dr. Muna Tavares,  as long as she does not have any GI symptoms that relates to her diverticulitis we can keep her March appointment.

## 2022-01-20 NOTE — TELEPHONE ENCOUNTER
1 year sent 1/18/22    Current refill request refused due to refill is either a duplicate request or has active refills at the pharmacy. Check previous templates.     Requested Prescriptions     Refused Prescriptions Disp Refills   • METOPROLOL SUCCINATE 2

## 2022-01-20 NOTE — TELEPHONE ENCOUNTER
Pt is calling and states she saw   on 1/20/22.   Dr Cali Lira stated to the patient that he would send a summary of the appt today to Dr. Katarina Guajardo    Pt is wondering if Dr Katarina Guajardo still wants to see her in regards to this appt or will the summary

## 2022-01-20 NOTE — TELEPHONE ENCOUNTER
Please let patient know that her chest x-ray was clear. No pneumonia. Mammogram also good. When she sees Dr. Yancy Patel please ask her to let me know what he recommends for follow-up of her diverticulitis.   Specifically if he recommended follow-up col

## 2022-03-08 ENCOUNTER — TELEPHONE (OUTPATIENT)
Dept: INTERNAL MEDICINE CLINIC | Facility: CLINIC | Age: 77
End: 2022-03-08

## 2022-03-08 ENCOUNTER — LAB ENCOUNTER (OUTPATIENT)
Dept: LAB | Age: 77
End: 2022-03-08
Attending: INTERNAL MEDICINE
Payer: MEDICARE

## 2022-03-08 ENCOUNTER — OFFICE VISIT (OUTPATIENT)
Dept: INTERNAL MEDICINE CLINIC | Facility: CLINIC | Age: 77
End: 2022-03-08
Payer: COMMERCIAL

## 2022-03-08 VITALS
HEART RATE: 88 BPM | DIASTOLIC BLOOD PRESSURE: 74 MMHG | TEMPERATURE: 98 F | OXYGEN SATURATION: 99 % | SYSTOLIC BLOOD PRESSURE: 110 MMHG

## 2022-03-08 DIAGNOSIS — Z12.11 COLON CANCER SCREENING: ICD-10-CM

## 2022-03-08 DIAGNOSIS — K57.92 DIVERTICULITIS: ICD-10-CM

## 2022-03-08 DIAGNOSIS — Z00.00 ROUTINE HEALTH MAINTENANCE: ICD-10-CM

## 2022-03-08 DIAGNOSIS — E03.9 ACQUIRED HYPOTHYROIDISM: ICD-10-CM

## 2022-03-08 DIAGNOSIS — I10 ESSENTIAL HYPERTENSION: ICD-10-CM

## 2022-03-08 DIAGNOSIS — N82.3 RECTOVAGINAL FISTULA: ICD-10-CM

## 2022-03-08 DIAGNOSIS — Z00.00 MEDICARE ANNUAL WELLNESS VISIT, SUBSEQUENT: Primary | ICD-10-CM

## 2022-03-08 DIAGNOSIS — R00.2 PALPITATIONS: ICD-10-CM

## 2022-03-08 LAB
ALBUMIN SERPL-MCNC: 3.7 G/DL (ref 3.4–5)
ALBUMIN/GLOB SERPL: 1.2 {RATIO} (ref 1–2)
ALP LIVER SERPL-CCNC: 76 U/L
ALT SERPL-CCNC: 27 U/L
ANION GAP SERPL CALC-SCNC: 3 MMOL/L (ref 0–18)
AST SERPL-CCNC: 16 U/L (ref 15–37)
BASOPHILS # BLD AUTO: 0.04 X10(3) UL (ref 0–0.2)
BASOPHILS NFR BLD AUTO: 0.5 %
BILIRUB SERPL-MCNC: 0.5 MG/DL (ref 0.1–2)
BILIRUB UR QL: NEGATIVE
BUN BLD-MCNC: 14 MG/DL (ref 7–18)
BUN/CREAT SERPL: 17.1 (ref 10–20)
CALCIUM BLD-MCNC: 9.3 MG/DL (ref 8.5–10.1)
CHLORIDE SERPL-SCNC: 106 MMOL/L (ref 98–112)
CHOLEST SERPL-MCNC: 180 MG/DL (ref ?–200)
CO2 SERPL-SCNC: 29 MMOL/L (ref 21–32)
COLOR UR: YELLOW
CREAT BLD-MCNC: 0.82 MG/DL
DEPRECATED RDW RBC AUTO: 42.8 FL (ref 35.1–46.3)
EOSINOPHIL # BLD AUTO: 0.25 X10(3) UL (ref 0–0.7)
EOSINOPHIL NFR BLD AUTO: 2.8 %
ERYTHROCYTE [DISTWIDTH] IN BLOOD BY AUTOMATED COUNT: 13.2 % (ref 11–15)
FASTING PATIENT LIPID ANSWER: NO
FASTING STATUS PATIENT QL REPORTED: NO
GLOBULIN PLAS-MCNC: 3.2 G/DL (ref 2.8–4.4)
GLUCOSE BLD-MCNC: 96 MG/DL (ref 70–99)
GLUCOSE UR-MCNC: NEGATIVE MG/DL
HCT VFR BLD AUTO: 41.4 %
HDLC SERPL-MCNC: 56 MG/DL (ref 40–59)
HGB BLD-MCNC: 13.3 G/DL
HGB UR QL STRIP.AUTO: NEGATIVE
IMM GRANULOCYTES # BLD AUTO: 0.05 X10(3) UL (ref 0–1)
IMM GRANULOCYTES NFR BLD: 0.6 %
KETONES UR-MCNC: NEGATIVE MG/DL
LDLC SERPL CALC-MCNC: 108 MG/DL (ref ?–100)
LEUKOCYTE ESTERASE UR QL STRIP.AUTO: NEGATIVE
LYMPHOCYTES NFR BLD AUTO: 18 %
MCH RBC QN AUTO: 28.4 PG (ref 26–34)
MCHC RBC AUTO-ENTMCNC: 32.1 G/DL (ref 31–37)
MCV RBC AUTO: 88.3 FL
MONOCYTES # BLD AUTO: 0.62 X10(3) UL (ref 0.1–1)
MONOCYTES NFR BLD AUTO: 7 %
NEUTROPHILS # BLD AUTO: 6.26 X10 (3) UL (ref 1.5–7.7)
NEUTROPHILS # BLD AUTO: 6.26 X10(3) UL (ref 1.5–7.7)
NEUTROPHILS NFR BLD AUTO: 71.1 %
NITRITE UR QL STRIP.AUTO: NEGATIVE
NONHDLC SERPL-MCNC: 124 MG/DL (ref ?–130)
OSMOLALITY SERPL CALC.SUM OF ELEC: 286 MOSM/KG (ref 275–295)
PH UR: 5 [PH] (ref 5–8)
PLATELET # BLD AUTO: 315 10(3)UL (ref 150–450)
POTASSIUM SERPL-SCNC: 4.8 MMOL/L (ref 3.5–5.1)
PROT SERPL-MCNC: 6.9 G/DL (ref 6.4–8.2)
PROT UR-MCNC: NEGATIVE MG/DL
RBC # BLD AUTO: 4.69 X10(6)UL
SODIUM SERPL-SCNC: 138 MMOL/L (ref 136–145)
SP GR UR STRIP: 1.02 (ref 1–1.03)
T3FREE SERPL-MCNC: 2.87 PG/ML (ref 2.4–4.2)
T4 FREE SERPL-MCNC: 1.4 NG/DL (ref 0.8–1.7)
TRIGL SERPL-MCNC: 86 MG/DL (ref 30–149)
TSI SER-ACNC: 0.34 MIU/ML (ref 0.36–3.74)
UROBILINOGEN UR STRIP-ACNC: <2
VIT C UR-MCNC: NEGATIVE MG/DL
VLDLC SERPL CALC-MCNC: 15 MG/DL (ref 0–30)
WBC # BLD AUTO: 8.8 X10(3) UL (ref 4–11)

## 2022-03-08 PROCEDURE — 93000 ELECTROCARDIOGRAM COMPLETE: CPT | Performed by: INTERNAL MEDICINE

## 2022-03-08 PROCEDURE — 84481 FREE ASSAY (FT-3): CPT | Performed by: INTERNAL MEDICINE

## 2022-03-08 PROCEDURE — 80053 COMPREHEN METABOLIC PANEL: CPT | Performed by: INTERNAL MEDICINE

## 2022-03-08 PROCEDURE — 3078F DIAST BP <80 MM HG: CPT | Performed by: INTERNAL MEDICINE

## 2022-03-08 PROCEDURE — 3074F SYST BP LT 130 MM HG: CPT | Performed by: INTERNAL MEDICINE

## 2022-03-08 PROCEDURE — 36415 COLL VENOUS BLD VENIPUNCTURE: CPT | Performed by: INTERNAL MEDICINE

## 2022-03-08 PROCEDURE — 85025 COMPLETE CBC W/AUTO DIFF WBC: CPT | Performed by: INTERNAL MEDICINE

## 2022-03-08 PROCEDURE — 80061 LIPID PANEL: CPT | Performed by: INTERNAL MEDICINE

## 2022-03-08 PROCEDURE — G0439 PPPS, SUBSEQ VISIT: HCPCS | Performed by: INTERNAL MEDICINE

## 2022-03-08 PROCEDURE — 84443 ASSAY THYROID STIM HORMONE: CPT | Performed by: INTERNAL MEDICINE

## 2022-03-08 PROCEDURE — 81003 URINALYSIS AUTO W/O SCOPE: CPT | Performed by: INTERNAL MEDICINE

## 2022-03-08 PROCEDURE — 96160 PT-FOCUSED HLTH RISK ASSMT: CPT | Performed by: INTERNAL MEDICINE

## 2022-03-08 PROCEDURE — 84439 ASSAY OF FREE THYROXINE: CPT | Performed by: INTERNAL MEDICINE

## 2022-03-08 PROCEDURE — 99397 PER PM REEVAL EST PAT 65+ YR: CPT | Performed by: INTERNAL MEDICINE

## 2022-03-08 RX ORDER — POLYETHYLENE GLYCOL 3350, SODIUM CHLORIDE, SODIUM BICARBONATE, POTASSIUM CHLORIDE 420; 11.2; 5.72; 1.48 G/4L; G/4L; G/4L; G/4L
POWDER, FOR SOLUTION ORAL
COMMUNITY
Start: 2022-01-20 | End: 2022-03-28

## 2022-03-08 RX ORDER — AMOXICILLIN AND CLAVULANATE POTASSIUM 875; 125 MG/1; MG/1
1 TABLET, FILM COATED ORAL 2 TIMES DAILY
Qty: 20 TABLET | Refills: 0 | Status: SHIPPED | OUTPATIENT
Start: 2022-03-08 | End: 2022-03-18

## 2022-03-22 ENCOUNTER — HOSPITAL ENCOUNTER (EMERGENCY)
Facility: HOSPITAL | Age: 77
Discharge: HOME | End: 2022-03-22
Attending: EMERGENCY MEDICINE
Payer: MEDICARE

## 2022-03-22 ENCOUNTER — APPOINTMENT (OUTPATIENT)
Dept: GENERAL RADIOLOGY | Facility: HOSPITAL | Age: 77
End: 2022-03-22
Attending: EMERGENCY MEDICINE
Payer: MEDICARE

## 2022-03-22 VITALS
HEART RATE: 67 BPM | RESPIRATION RATE: 13 BRPM | WEIGHT: 202 LBS | TEMPERATURE: 99 F | BODY MASS INDEX: 34.49 KG/M2 | HEIGHT: 64 IN | DIASTOLIC BLOOD PRESSURE: 70 MMHG | SYSTOLIC BLOOD PRESSURE: 118 MMHG | OXYGEN SATURATION: 98 %

## 2022-03-22 DIAGNOSIS — R00.2 PALPITATIONS: Primary | ICD-10-CM

## 2022-03-22 LAB
ALBUMIN SERPL-MCNC: 3.5 G/DL (ref 3.4–5)
ALP LIVER SERPL-CCNC: 81 U/L
ALT SERPL-CCNC: 29 U/L
ANION GAP SERPL CALC-SCNC: 5 MMOL/L (ref 0–18)
AST SERPL-CCNC: 17 U/L (ref 15–37)
BASOPHILS # BLD AUTO: 0.06 X10(3) UL (ref 0–0.2)
BASOPHILS NFR BLD AUTO: 0.5 %
BILIRUB DIRECT SERPL-MCNC: <0.1 MG/DL (ref 0–0.2)
BILIRUB SERPL-MCNC: 0.3 MG/DL (ref 0.1–2)
BILIRUB UR QL: NEGATIVE
BUN BLD-MCNC: 16 MG/DL (ref 7–18)
BUN/CREAT SERPL: 15.2 (ref 10–20)
CALCIUM BLD-MCNC: 9 MG/DL (ref 8.5–10.1)
CHLORIDE SERPL-SCNC: 104 MMOL/L (ref 98–112)
CLARITY UR: CLEAR
CO2 SERPL-SCNC: 29 MMOL/L (ref 21–32)
COLOR UR: YELLOW
CREAT BLD-MCNC: 1.05 MG/DL
D DIMER PPP FEU-MCNC: 0.42 UG/ML FEU (ref ?–0.75)
DEPRECATED RDW RBC AUTO: 41.3 FL (ref 35.1–46.3)
EOSINOPHIL # BLD AUTO: 0.16 X10(3) UL (ref 0–0.7)
EOSINOPHIL NFR BLD AUTO: 1.3 %
ERYTHROCYTE [DISTWIDTH] IN BLOOD BY AUTOMATED COUNT: 12.9 % (ref 11–15)
GLUCOSE BLD-MCNC: 93 MG/DL (ref 70–99)
GLUCOSE UR-MCNC: NEGATIVE MG/DL
HCT VFR BLD AUTO: 41.1 %
HGB BLD-MCNC: 13 G/DL
HGB UR QL STRIP.AUTO: NEGATIVE
IMM GRANULOCYTES # BLD AUTO: 0.05 X10(3) UL (ref 0–1)
KETONES UR-MCNC: NEGATIVE MG/DL
LYMPHOCYTES # BLD AUTO: 1.81 X10(3) UL (ref 1–4)
LYMPHOCYTES NFR BLD AUTO: 14.2 %
MCH RBC QN AUTO: 27.8 PG (ref 26–34)
MCHC RBC AUTO-ENTMCNC: 31.6 G/DL (ref 31–37)
MCV RBC AUTO: 87.8 FL
MONOCYTES # BLD AUTO: 0.87 X10(3) UL (ref 0.1–1)
MONOCYTES NFR BLD AUTO: 6.8 %
NEUTROPHILS # BLD AUTO: 9.79 X10 (3) UL (ref 1.5–7.7)
NEUTROPHILS # BLD AUTO: 9.79 X10(3) UL (ref 1.5–7.7)
NEUTROPHILS NFR BLD AUTO: 76.8 %
NITRITE UR QL STRIP.AUTO: NEGATIVE
NT-PROBNP SERPL-MCNC: 513 PG/ML (ref ?–450)
OSMOLALITY SERPL CALC.SUM OF ELEC: 287 MOSM/KG (ref 275–295)
PH UR: 6 [PH] (ref 5–8)
PLATELET # BLD AUTO: 309 10(3)UL (ref 150–450)
POTASSIUM SERPL-SCNC: 4.4 MMOL/L (ref 3.5–5.1)
PROT SERPL-MCNC: 7.3 G/DL (ref 6.4–8.2)
PROT UR-MCNC: NEGATIVE MG/DL
RBC # BLD AUTO: 4.68 X10(6)UL
SODIUM SERPL-SCNC: 138 MMOL/L (ref 136–145)
T3FREE SERPL-MCNC: 2.71 PG/ML (ref 2.4–4.2)
T4 FREE SERPL-MCNC: 1.2 NG/DL (ref 0.8–1.7)
TROPONIN I HIGH SENSITIVITY: 4 NG/L
TSI SER-ACNC: 0.23 MIU/ML (ref 0.36–3.74)
UROBILINOGEN UR STRIP-ACNC: <2
VIT C UR-MCNC: 20 MG/DL
WBC # BLD AUTO: 12.7 X10(3) UL (ref 4–11)

## 2022-03-22 PROCEDURE — 85379 FIBRIN DEGRADATION QUANT: CPT | Performed by: EMERGENCY MEDICINE

## 2022-03-22 PROCEDURE — 84484 ASSAY OF TROPONIN QUANT: CPT | Performed by: EMERGENCY MEDICINE

## 2022-03-22 PROCEDURE — 84481 FREE ASSAY (FT-3): CPT | Performed by: EMERGENCY MEDICINE

## 2022-03-22 PROCEDURE — 80076 HEPATIC FUNCTION PANEL: CPT | Performed by: EMERGENCY MEDICINE

## 2022-03-22 PROCEDURE — 93005 ELECTROCARDIOGRAM TRACING: CPT

## 2022-03-22 PROCEDURE — 96360 HYDRATION IV INFUSION INIT: CPT

## 2022-03-22 PROCEDURE — 85025 COMPLETE CBC W/AUTO DIFF WBC: CPT | Performed by: EMERGENCY MEDICINE

## 2022-03-22 PROCEDURE — 87086 URINE CULTURE/COLONY COUNT: CPT | Performed by: EMERGENCY MEDICINE

## 2022-03-22 PROCEDURE — 71045 X-RAY EXAM CHEST 1 VIEW: CPT | Performed by: EMERGENCY MEDICINE

## 2022-03-22 PROCEDURE — 99284 EMERGENCY DEPT VISIT MOD MDM: CPT

## 2022-03-22 PROCEDURE — 84439 ASSAY OF FREE THYROXINE: CPT | Performed by: EMERGENCY MEDICINE

## 2022-03-22 PROCEDURE — 83880 ASSAY OF NATRIURETIC PEPTIDE: CPT | Performed by: EMERGENCY MEDICINE

## 2022-03-22 PROCEDURE — 93010 ELECTROCARDIOGRAM REPORT: CPT | Performed by: EMERGENCY MEDICINE

## 2022-03-22 PROCEDURE — 81001 URINALYSIS AUTO W/SCOPE: CPT | Performed by: EMERGENCY MEDICINE

## 2022-03-22 PROCEDURE — 80048 BASIC METABOLIC PNL TOTAL CA: CPT | Performed by: EMERGENCY MEDICINE

## 2022-03-22 PROCEDURE — 84443 ASSAY THYROID STIM HORMONE: CPT | Performed by: EMERGENCY MEDICINE

## 2022-03-22 RX ORDER — SODIUM CHLORIDE 9 MG/ML
125 INJECTION, SOLUTION INTRAVENOUS CONTINUOUS
Status: DISCONTINUED | OUTPATIENT
Start: 2022-03-22 | End: 2022-03-22

## 2022-03-22 NOTE — ED INITIAL ASSESSMENT (HPI)
Intermittent palpitations since this morning. Reports intermittent bilateral leg weakness. Reports feeling better after eating a banana and drinking an electrolyte drink. Also reports increased urination and frequent stools.

## 2022-03-23 NOTE — ED QUICK NOTES
Patient presents with:  Arrythmia/Palpitations    Patient aox3 to ed via private vehicle co of bilateral leg weakness and tingling x yesterday morning, stated she was outside all day doing things around the house. Patient reported having palpitations today and was worried that her electrolyte was imbalanced     Patient changed into gown on nibp cardiac and spo2 monitors.  Side railsx2 call light within reach

## 2022-03-30 ENCOUNTER — TELEPHONE (OUTPATIENT)
Dept: INTERNAL MEDICINE CLINIC | Facility: CLINIC | Age: 77
End: 2022-03-30

## 2022-03-30 NOTE — TELEPHONE ENCOUNTER
Please call patient  She left voicemail message requesting order for xray or appointment with Dr Jeffrey Joseph  Concerned about possible nerve problem  Tasked to nursing

## 2022-03-30 NOTE — TELEPHONE ENCOUNTER
Pt was seen in ER 3/22/22- reports she experiences weakness in her legs after getting out of her car. She has already seen cardiology as f/u after ER visit, pt wanted to schedule in office appt for further evaluation with PCP. Denies any new or persistent symptoms at this time.  Pt requested appt for 4/5 -- appt scheduled 4/5/22 at 2:30pm.

## 2022-04-05 ENCOUNTER — LAB ENCOUNTER (OUTPATIENT)
Dept: LAB | Age: 77
End: 2022-04-05
Attending: INTERNAL MEDICINE
Payer: MEDICARE

## 2022-04-05 ENCOUNTER — OFFICE VISIT (OUTPATIENT)
Dept: INTERNAL MEDICINE CLINIC | Facility: CLINIC | Age: 77
End: 2022-04-05
Payer: COMMERCIAL

## 2022-04-05 VITALS
OXYGEN SATURATION: 97 % | WEIGHT: 202 LBS | SYSTOLIC BLOOD PRESSURE: 138 MMHG | TEMPERATURE: 99 F | HEART RATE: 71 BPM | BODY MASS INDEX: 34.49 KG/M2 | HEIGHT: 64 IN | DIASTOLIC BLOOD PRESSURE: 80 MMHG

## 2022-04-05 DIAGNOSIS — K57.92 DIVERTICULITIS: ICD-10-CM

## 2022-04-05 DIAGNOSIS — R00.2 PALPITATIONS: ICD-10-CM

## 2022-04-05 DIAGNOSIS — Z00.00 ROUTINE HEALTH MAINTENANCE: ICD-10-CM

## 2022-04-05 DIAGNOSIS — M54.50 ACUTE MIDLINE LOW BACK PAIN WITHOUT SCIATICA: Primary | ICD-10-CM

## 2022-04-05 DIAGNOSIS — E03.9 ACQUIRED HYPOTHYROIDISM: ICD-10-CM

## 2022-04-05 DIAGNOSIS — Z12.11 COLON CANCER SCREENING: ICD-10-CM

## 2022-04-05 DIAGNOSIS — I10 ESSENTIAL HYPERTENSION: ICD-10-CM

## 2022-04-05 LAB
ANION GAP SERPL CALC-SCNC: 6 MMOL/L (ref 0–18)
BASOPHILS # BLD AUTO: 0.06 X10(3) UL (ref 0–0.2)
BASOPHILS NFR BLD AUTO: 0.6 %
BUN BLD-MCNC: 19 MG/DL (ref 7–18)
BUN/CREAT SERPL: 22.1 (ref 10–20)
CALCIUM BLD-MCNC: 9.5 MG/DL (ref 8.5–10.1)
CHLORIDE SERPL-SCNC: 106 MMOL/L (ref 98–112)
CO2 SERPL-SCNC: 28 MMOL/L (ref 21–32)
CREAT BLD-MCNC: 0.86 MG/DL
DEPRECATED RDW RBC AUTO: 42 FL (ref 35.1–46.3)
EOSINOPHIL # BLD AUTO: 0.25 X10(3) UL (ref 0–0.7)
EOSINOPHIL NFR BLD AUTO: 2.4 %
ERYTHROCYTE [DISTWIDTH] IN BLOOD BY AUTOMATED COUNT: 12.7 % (ref 11–15)
FASTING STATUS PATIENT QL REPORTED: NO
GLUCOSE BLD-MCNC: 134 MG/DL (ref 70–99)
HCT VFR BLD AUTO: 42.4 %
HGB BLD-MCNC: 13.3 G/DL
IMM GRANULOCYTES # BLD AUTO: 0.05 X10(3) UL (ref 0–1)
IMM GRANULOCYTES NFR BLD: 0.5 %
LYMPHOCYTES # BLD AUTO: 2.25 X10(3) UL (ref 1–4)
LYMPHOCYTES NFR BLD AUTO: 22 %
MCH RBC QN AUTO: 28.1 PG (ref 26–34)
MCHC RBC AUTO-ENTMCNC: 31.4 G/DL (ref 31–37)
MCV RBC AUTO: 89.6 FL
MONOCYTES # BLD AUTO: 0.69 X10(3) UL (ref 0.1–1)
MONOCYTES NFR BLD AUTO: 6.7 %
NEUTROPHILS # BLD AUTO: 6.95 X10 (3) UL (ref 1.5–7.7)
NEUTROPHILS # BLD AUTO: 6.95 X10(3) UL (ref 1.5–7.7)
NEUTROPHILS NFR BLD AUTO: 67.8 %
OSMOLALITY SERPL CALC.SUM OF ELEC: 294 MOSM/KG (ref 275–295)
PLATELET # BLD AUTO: 310 10(3)UL (ref 150–450)
POTASSIUM SERPL-SCNC: 4.7 MMOL/L (ref 3.5–5.1)
RBC # BLD AUTO: 4.73 X10(6)UL
SODIUM SERPL-SCNC: 140 MMOL/L (ref 136–145)
T4 FREE SERPL-MCNC: 1.2 NG/DL (ref 0.8–1.7)
TSI SER-ACNC: 0.18 MIU/ML (ref 0.36–3.74)
WBC # BLD AUTO: 10.3 X10(3) UL (ref 4–11)

## 2022-04-05 PROCEDURE — 3079F DIAST BP 80-89 MM HG: CPT | Performed by: INTERNAL MEDICINE

## 2022-04-05 PROCEDURE — 36415 COLL VENOUS BLD VENIPUNCTURE: CPT | Performed by: INTERNAL MEDICINE

## 2022-04-05 PROCEDURE — 85025 COMPLETE CBC W/AUTO DIFF WBC: CPT | Performed by: INTERNAL MEDICINE

## 2022-04-05 PROCEDURE — 99214 OFFICE O/P EST MOD 30 MIN: CPT | Performed by: INTERNAL MEDICINE

## 2022-04-05 PROCEDURE — 84439 ASSAY OF FREE THYROXINE: CPT | Performed by: INTERNAL MEDICINE

## 2022-04-05 PROCEDURE — 84443 ASSAY THYROID STIM HORMONE: CPT | Performed by: INTERNAL MEDICINE

## 2022-04-05 PROCEDURE — 3008F BODY MASS INDEX DOCD: CPT | Performed by: INTERNAL MEDICINE

## 2022-04-05 PROCEDURE — 3075F SYST BP GE 130 - 139MM HG: CPT | Performed by: INTERNAL MEDICINE

## 2022-04-05 PROCEDURE — 80048 BASIC METABOLIC PNL TOTAL CA: CPT | Performed by: INTERNAL MEDICINE

## 2022-04-07 ENCOUNTER — TELEPHONE (OUTPATIENT)
Dept: INTERNAL MEDICINE CLINIC | Facility: CLINIC | Age: 77
End: 2022-04-07

## 2022-04-07 NOTE — TELEPHONE ENCOUNTER
Please let patient know that her labs came out fairly acceptable except it appears that her thyroid dose is a little bit more than she needs. A simple solution can be for her to skip one thyroid dose per week. What I would recommend is for her to pick a day of the week that she will remember NOT to take her thyroid tablet. May be a Saturday or Sunday or another day of the week that is easy for her to remember. I placed an order for a repeat thyroid to be done in 2 months. I also included a cholesterol test.  She may benefit from a cholesterol medication.   I have discussed this with her in the past. Rash

## 2022-04-07 NOTE — TELEPHONE ENCOUNTER
Noted.  I will comply with patient wishes for no return phone call.   I believe she will call if she continues to have problems

## 2022-04-07 NOTE — TELEPHONE ENCOUNTER
Spoke with patient to relay MD message below; Patient verbalized understanding to new instructions. Pt did state during call that she is having \"palpitations still and diverticulitis last night while caring for her mom\". No triage nurse available - I did explain I would need a nurse to speak with her further about these symptoms and to stay by her phone. She states she is busy and can not do this and \"shouldn't have said anything, just forgot I said that\". I explained its my job and duty to report serious symptoms like this. She further described the \"palpitations\" as an on/off flutter in her throat that has been going on - she said Dr. Yani Resendiz is aware of this. Also further explained she is on another round of Amoxicillin and took one \"as needed last night after pain in side\". I reiterated recent script SIG and it appears patient is not taking as prescribed but as needed. She replied \"you don't understand but Dr. Yani Resendiz does\". Patient made it clear she does not wish to hear back from me and \"wished she never said anything\". Verbalized frustrations.     Routed to Dr. Javier Zarate

## 2022-04-11 ENCOUNTER — TELEPHONE (OUTPATIENT)
Dept: INTERNAL MEDICINE CLINIC | Facility: CLINIC | Age: 77
End: 2022-04-11

## 2022-04-11 ENCOUNTER — APPOINTMENT (OUTPATIENT)
Dept: CT IMAGING | Facility: HOSPITAL | Age: 77
End: 2022-04-11
Attending: EMERGENCY MEDICINE
Payer: MEDICARE

## 2022-04-11 ENCOUNTER — HOSPITAL ENCOUNTER (EMERGENCY)
Facility: HOSPITAL | Age: 77
Discharge: HOME | End: 2022-04-11
Attending: EMERGENCY MEDICINE
Payer: MEDICARE

## 2022-04-11 VITALS
DIASTOLIC BLOOD PRESSURE: 82 MMHG | OXYGEN SATURATION: 98 % | TEMPERATURE: 99 F | SYSTOLIC BLOOD PRESSURE: 155 MMHG | HEART RATE: 63 BPM | RESPIRATION RATE: 18 BRPM

## 2022-04-11 DIAGNOSIS — K57.92 ACUTE DIVERTICULITIS: Primary | ICD-10-CM

## 2022-04-11 LAB
ANION GAP SERPL CALC-SCNC: 3 MMOL/L (ref 0–18)
BASOPHILS # BLD AUTO: 0.05 X10(3) UL (ref 0–0.2)
BASOPHILS NFR BLD AUTO: 0.4 %
BILIRUB UR QL: NEGATIVE
BUN BLD-MCNC: 16 MG/DL (ref 7–18)
BUN/CREAT SERPL: 17.8 (ref 10–20)
CALCIUM BLD-MCNC: 9.5 MG/DL (ref 8.5–10.1)
CHLORIDE SERPL-SCNC: 102 MMOL/L (ref 98–112)
CLARITY UR: CLEAR
CO2 SERPL-SCNC: 31 MMOL/L (ref 21–32)
COLOR UR: YELLOW
CREAT BLD-MCNC: 0.9 MG/DL
DEPRECATED RDW RBC AUTO: 39.9 FL (ref 35.1–46.3)
EOSINOPHIL # BLD AUTO: 0.16 X10(3) UL (ref 0–0.7)
EOSINOPHIL NFR BLD AUTO: 1.3 %
ERYTHROCYTE [DISTWIDTH] IN BLOOD BY AUTOMATED COUNT: 12.6 % (ref 11–15)
GLUCOSE BLD-MCNC: 95 MG/DL (ref 70–99)
GLUCOSE UR-MCNC: NEGATIVE MG/DL
HCT VFR BLD AUTO: 44.4 %
HGB BLD-MCNC: 14.4 G/DL
HGB UR QL STRIP.AUTO: NEGATIVE
IMM GRANULOCYTES # BLD AUTO: 0.05 X10(3) UL (ref 0–1)
IMM GRANULOCYTES NFR BLD: 0.4 %
KETONES UR-MCNC: NEGATIVE MG/DL
LEUKOCYTE ESTERASE UR QL STRIP.AUTO: NEGATIVE
LYMPHOCYTES # BLD AUTO: 1.81 X10(3) UL (ref 1–4)
LYMPHOCYTES NFR BLD AUTO: 15.1 %
MCH RBC QN AUTO: 28 PG (ref 26–34)
MCHC RBC AUTO-ENTMCNC: 32.4 G/DL (ref 31–37)
MCV RBC AUTO: 86.2 FL
MONOCYTES # BLD AUTO: 0.76 X10(3) UL (ref 0.1–1)
MONOCYTES NFR BLD AUTO: 6.4 %
NEUTROPHILS # BLD AUTO: 9.13 X10 (3) UL (ref 1.5–7.7)
NEUTROPHILS # BLD AUTO: 9.13 X10(3) UL (ref 1.5–7.7)
NEUTROPHILS NFR BLD AUTO: 76.4 %
NITRITE UR QL STRIP.AUTO: NEGATIVE
OSMOLALITY SERPL CALC.SUM OF ELEC: 283 MOSM/KG (ref 275–295)
PH UR: 5 [PH] (ref 5–8)
PLATELET # BLD AUTO: 363 10(3)UL (ref 150–450)
POTASSIUM SERPL-SCNC: 4.6 MMOL/L (ref 3.5–5.1)
PROT UR-MCNC: NEGATIVE MG/DL
RBC # BLD AUTO: 5.15 X10(6)UL
SODIUM SERPL-SCNC: 136 MMOL/L (ref 136–145)
SP GR UR STRIP: 1.01 (ref 1–1.03)
UROBILINOGEN UR STRIP-ACNC: <2
VIT C UR-MCNC: NEGATIVE MG/DL
WBC # BLD AUTO: 12 X10(3) UL (ref 4–11)

## 2022-04-11 PROCEDURE — 81003 URINALYSIS AUTO W/O SCOPE: CPT | Performed by: EMERGENCY MEDICINE

## 2022-04-11 PROCEDURE — 74177 CT ABD & PELVIS W/CONTRAST: CPT | Performed by: EMERGENCY MEDICINE

## 2022-04-11 PROCEDURE — 80048 BASIC METABOLIC PNL TOTAL CA: CPT | Performed by: EMERGENCY MEDICINE

## 2022-04-11 PROCEDURE — 85025 COMPLETE CBC W/AUTO DIFF WBC: CPT | Performed by: EMERGENCY MEDICINE

## 2022-04-11 PROCEDURE — 96360 HYDRATION IV INFUSION INIT: CPT

## 2022-04-11 PROCEDURE — 99284 EMERGENCY DEPT VISIT MOD MDM: CPT

## 2022-04-11 RX ORDER — AMOXICILLIN AND CLAVULANATE POTASSIUM 500; 125 MG/1; MG/1
1 TABLET, FILM COATED ORAL 3 TIMES DAILY
Qty: 15 TABLET | Refills: 0 | Status: SHIPPED | OUTPATIENT
Start: 2022-04-11 | End: 2022-04-15

## 2022-04-11 NOTE — TELEPHONE ENCOUNTER
Please call patient. Please let her know that I see she is on the schedule at 4 PM for her \"diverticulitis\". However she is going to need a CT scan of her abdomen. At 4 PM we will not be able to get that done and get the results efficiently. Please let her know that I would like her to go to the emergency room where she can be evaluated for \"diverticulitis\". We can get her CAT scan of the abdomen. We can then decide on best treatments. Please let her know this is important as I know she is got her colonoscopy coming up and if she has \"acute diverticulitis\" this colonoscopy may need to be rescheduled. I cannot order a stat CT scan from the office with her insurance. The CT scans on a \"stat\" basis need to be done through the emergency room. Renal she may not want to go to emergency room as she was just there for her back pain but this is important to figure out whether she is got diverticulitis or not.

## 2022-04-11 NOTE — TELEPHONE ENCOUNTER
Spoke to pt who reports she has been feeling much better since last week and abdominal pain now feels more like abdominal soreness. Reports on 4/7 she started taking Augmentin 875-125 x2 tablets and \"felt like she was dying and her throat was closing. \" reports she stopped taking that and then found an old 3year old rx for Augmentin (same medication) but lower dose of 500mg bid and has been taking it since then. Reports today is her last tablet. Pt denies any recurrent throat symptoms since,. Reports she was experiencing R/L quadrant lower abdominal pain/stabbing prior to abx treatment. States she has been taking florastor additionally. She also states she has more energy and is feeling much better. Denies bowel changes, diarrhea, nausea/vomiting, fever. Eating bland diet and tolerating. RN advised on MD message to ER; pt declined and would like to come in office as she is feeling so much better. RN spoke to MD regarding above update. Per Dr. Karen Fuentes, he would still like pt to go to ER as she may have abscess and would still want CT scan regardless of ER vs in office appt. States she would most likely she recommended to ER even if she waited for appt. RN spoke to pt and advised on MD recommendation again; she verbalized understanding and agreeable to ER now. Appt cancelled.

## 2022-04-11 NOTE — ED INITIAL ASSESSMENT (HPI)
PATIENT AOX3 TO ED VIA PRIVATE VEHICLE CO OF ABD PAIN X Wednesday LOWER QUADRANT. DENIES URINARY SX. HX OF DIVERTICULITIS.  DENIES N/V/D

## 2022-04-12 ENCOUNTER — OFFICE VISIT (OUTPATIENT)
Dept: INTERNAL MEDICINE CLINIC | Facility: CLINIC | Age: 77
End: 2022-04-12
Payer: COMMERCIAL

## 2022-04-12 VITALS
TEMPERATURE: 98 F | SYSTOLIC BLOOD PRESSURE: 132 MMHG | DIASTOLIC BLOOD PRESSURE: 82 MMHG | WEIGHT: 200.38 LBS | BODY MASS INDEX: 34 KG/M2 | OXYGEN SATURATION: 99 % | HEART RATE: 76 BPM

## 2022-04-12 DIAGNOSIS — R00.2 PALPITATIONS: ICD-10-CM

## 2022-04-12 DIAGNOSIS — E03.9 ACQUIRED HYPOTHYROIDISM: ICD-10-CM

## 2022-04-12 DIAGNOSIS — K57.92 ACUTE DIVERTICULITIS: Primary | ICD-10-CM

## 2022-04-12 DIAGNOSIS — I10 ESSENTIAL HYPERTENSION: ICD-10-CM

## 2022-04-12 PROCEDURE — 3079F DIAST BP 80-89 MM HG: CPT | Performed by: INTERNAL MEDICINE

## 2022-04-12 PROCEDURE — 99214 OFFICE O/P EST MOD 30 MIN: CPT | Performed by: INTERNAL MEDICINE

## 2022-04-12 PROCEDURE — 3075F SYST BP GE 130 - 139MM HG: CPT | Performed by: INTERNAL MEDICINE

## 2022-04-12 RX ORDER — FAMOTIDINE 20 MG/1
20 TABLET, FILM COATED ORAL 2 TIMES DAILY
Refills: 0 | COMMUNITY
Start: 2022-04-12

## 2022-04-12 RX ORDER — AMINO ACIDS/MV,IRON,MIN
TABLET ORAL
COMMUNITY

## 2022-04-12 NOTE — TELEPHONE ENCOUNTER
Spoke with patient  Unable to come in on Thursday as her mom will be back & she takes care of her  Pt scheduled appt with Dr David Felix for today at 1pm  She has questions about medication & some other things too    Sent to clinical as alok

## 2022-04-12 NOTE — TELEPHONE ENCOUNTER
Please call patient and let her know that I appreciate her going to the emergency room. This way we have a good idea of what is going on. Thankfully the CAT did not show any major inflammation but what looked like \"resolving\" diverticulitis that is partially treated. It appears that she got Augmentin antibiotics for 5 days. I think this is reasonable. Please ask her to follow-up with me Thursday or I can recheck her abdomen and decide on the length of time she should be on antibiotics since she keeps getting the diverticulitis symptoms.

## 2022-04-14 ENCOUNTER — TELEPHONE (OUTPATIENT)
Dept: INTERNAL MEDICINE CLINIC | Facility: CLINIC | Age: 77
End: 2022-04-14

## 2022-04-14 NOTE — TELEPHONE ENCOUNTER
To Dr. Moise Mosley----    Spoke to pt for condition update. She states that the sharp pains are gone. She still feels slightly sore in the lower abdomen. Pain up to 3 or 4 out of 10 now, was previously up to 9/10 so feels that she is improving. She will be completing augmentin on Saturday. She wonders if this is enough medication. Pt also taking pepcid BID, which is helping with heartburn. She is having normal BM's. She spoke with Dr. Miladys Taylor office and has colonoscopy set for July 18th.

## 2022-04-14 NOTE — TELEPHONE ENCOUNTER
Pt called to follow up as per Dr Walter Montaño request regarding diverticulitis  Sharp pains are gone, now sore in stomach area  Should patient have addt'l medication?   Tasked to nursing

## 2022-04-15 RX ORDER — AMOXICILLIN AND CLAVULANATE POTASSIUM 500; 125 MG/1; MG/1
1 TABLET, FILM COATED ORAL 3 TIMES DAILY
Qty: 15 TABLET | Refills: 0 | Status: SHIPPED | OUTPATIENT
Start: 2022-04-15 | End: 2022-04-20

## 2022-04-15 NOTE — TELEPHONE ENCOUNTER
Please let patient know that I sent over an additional 5 days of Augmentin for her since she is getting better. This will be in addition to the 9 days she already took to total a 14-day treatment. I think she should have a total of 14 days of treatment. Refill sent.

## 2022-10-25 ENCOUNTER — TELEPHONE (OUTPATIENT)
Dept: INTERNAL MEDICINE CLINIC | Facility: CLINIC | Age: 77
End: 2022-10-25

## 2022-11-11 ENCOUNTER — TELEPHONE (OUTPATIENT)
Dept: INTERNAL MEDICINE CLINIC | Facility: CLINIC | Age: 77
End: 2022-11-11

## 2022-11-11 ENCOUNTER — OFFICE VISIT (OUTPATIENT)
Dept: INTERNAL MEDICINE CLINIC | Facility: CLINIC | Age: 77
End: 2022-11-11
Payer: COMMERCIAL

## 2022-11-11 ENCOUNTER — LAB ENCOUNTER (OUTPATIENT)
Dept: LAB | Age: 77
End: 2022-11-11
Attending: INTERNAL MEDICINE
Payer: MEDICARE

## 2022-11-11 VITALS
HEIGHT: 64 IN | WEIGHT: 191 LBS | TEMPERATURE: 98 F | OXYGEN SATURATION: 99 % | BODY MASS INDEX: 32.61 KG/M2 | DIASTOLIC BLOOD PRESSURE: 80 MMHG | SYSTOLIC BLOOD PRESSURE: 134 MMHG | HEART RATE: 68 BPM

## 2022-11-11 DIAGNOSIS — I10 ESSENTIAL HYPERTENSION: ICD-10-CM

## 2022-11-11 DIAGNOSIS — R10.32 ABDOMINAL PAIN, ACUTE, BILATERAL LOWER QUADRANT: Primary | ICD-10-CM

## 2022-11-11 DIAGNOSIS — R10.31 ABDOMINAL PAIN, ACUTE, BILATERAL LOWER QUADRANT: Primary | ICD-10-CM

## 2022-11-11 DIAGNOSIS — Z00.00 ROUTINE HEALTH MAINTENANCE: ICD-10-CM

## 2022-11-11 DIAGNOSIS — E03.9 ACQUIRED HYPOTHYROIDISM: ICD-10-CM

## 2022-11-11 LAB
ANION GAP SERPL CALC-SCNC: 4 MMOL/L (ref 0–18)
BASOPHILS # BLD AUTO: 0.04 X10(3) UL (ref 0–0.2)
BASOPHILS NFR BLD AUTO: 0.4 %
BUN BLD-MCNC: 12 MG/DL (ref 7–18)
BUN/CREAT SERPL: 14.3 (ref 10–20)
CALCIUM BLD-MCNC: 9.6 MG/DL (ref 8.5–10.1)
CHLORIDE SERPL-SCNC: 107 MMOL/L (ref 98–112)
CHOLEST SERPL-MCNC: 166 MG/DL (ref ?–200)
CO2 SERPL-SCNC: 29 MMOL/L (ref 21–32)
CREAT BLD-MCNC: 0.84 MG/DL
DEPRECATED RDW RBC AUTO: 41.6 FL (ref 35.1–46.3)
EOSINOPHIL # BLD AUTO: 0.23 X10(3) UL (ref 0–0.7)
EOSINOPHIL NFR BLD AUTO: 2.6 %
ERYTHROCYTE [DISTWIDTH] IN BLOOD BY AUTOMATED COUNT: 12.8 % (ref 11–15)
FASTING PATIENT LIPID ANSWER: NO
FASTING STATUS PATIENT QL REPORTED: NO
GFR SERPLBLD BASED ON 1.73 SQ M-ARVRAT: 72 ML/MIN/1.73M2 (ref 60–?)
GLUCOSE BLD-MCNC: 98 MG/DL (ref 70–99)
HCT VFR BLD AUTO: 42.5 %
HDLC SERPL-MCNC: 57 MG/DL (ref 40–59)
HGB BLD-MCNC: 13.8 G/DL
IMM GRANULOCYTES # BLD AUTO: 0.03 X10(3) UL (ref 0–1)
IMM GRANULOCYTES NFR BLD: 0.3 %
LDLC SERPL CALC-MCNC: 93 MG/DL (ref ?–100)
LYMPHOCYTES # BLD AUTO: 2.04 X10(3) UL (ref 1–4)
LYMPHOCYTES NFR BLD AUTO: 22.9 %
MCH RBC QN AUTO: 28.6 PG (ref 26–34)
MCHC RBC AUTO-ENTMCNC: 32.5 G/DL (ref 31–37)
MCV RBC AUTO: 88.2 FL
MONOCYTES # BLD AUTO: 0.75 X10(3) UL (ref 0.1–1)
MONOCYTES NFR BLD AUTO: 8.4 %
NEUTROPHILS # BLD AUTO: 5.8 X10 (3) UL (ref 1.5–7.7)
NEUTROPHILS # BLD AUTO: 5.8 X10(3) UL (ref 1.5–7.7)
NEUTROPHILS NFR BLD AUTO: 65.4 %
NONHDLC SERPL-MCNC: 109 MG/DL (ref ?–130)
OSMOLALITY SERPL CALC.SUM OF ELEC: 290 MOSM/KG (ref 275–295)
PLATELET # BLD AUTO: 346 10(3)UL (ref 150–450)
POTASSIUM SERPL-SCNC: 5.3 MMOL/L (ref 3.5–5.1)
RBC # BLD AUTO: 4.82 X10(6)UL
SODIUM SERPL-SCNC: 140 MMOL/L (ref 136–145)
TRIGL SERPL-MCNC: 83 MG/DL (ref 30–149)
TSI SER-ACNC: 0.64 MIU/ML (ref 0.36–3.74)
VLDLC SERPL CALC-MCNC: 13 MG/DL (ref 0–30)
WBC # BLD AUTO: 8.9 X10(3) UL (ref 4–11)

## 2022-11-11 PROCEDURE — 85025 COMPLETE CBC W/AUTO DIFF WBC: CPT | Performed by: INTERNAL MEDICINE

## 2022-11-11 PROCEDURE — 80048 BASIC METABOLIC PNL TOTAL CA: CPT | Performed by: INTERNAL MEDICINE

## 2022-11-11 PROCEDURE — 36415 COLL VENOUS BLD VENIPUNCTURE: CPT | Performed by: INTERNAL MEDICINE

## 2022-11-11 PROCEDURE — 84443 ASSAY THYROID STIM HORMONE: CPT | Performed by: INTERNAL MEDICINE

## 2022-11-11 PROCEDURE — 80061 LIPID PANEL: CPT | Performed by: INTERNAL MEDICINE

## 2022-11-11 NOTE — TELEPHONE ENCOUNTER
Okay to let patient know today or next week that I thought her labs came out good. White count good. Thyroid good. Cholesterol good. Testing just very minimally elevated but she is had this before and I do not think this is a major problem.   All in all I am very satisfied with the results

## 2022-11-29 ENCOUNTER — TELEPHONE (OUTPATIENT)
Dept: INTERNAL MEDICINE CLINIC | Facility: CLINIC | Age: 77
End: 2022-11-29

## 2022-11-29 NOTE — TELEPHONE ENCOUNTER
Symptoms and vital signs noted. 1.  Please let patient know that I did receive her message. 2.  Since blood pressure and blood sugar appear to be good today there is probably nothing else I would be doing in the office. She recently had good labs. 3.  Next steps would be to rest and eat properly and keep her fluids up.

## 2022-12-02 RX ORDER — LEVOTHYROXINE SODIUM 0.12 MG/1
125 TABLET ORAL
Qty: 90 TABLET | Refills: 3 | Status: SHIPPED | OUTPATIENT
Start: 2022-12-02

## 2022-12-20 ENCOUNTER — APPOINTMENT (OUTPATIENT)
Dept: GENERAL RADIOLOGY | Facility: HOSPITAL | Age: 77
End: 2022-12-20
Payer: MEDICARE

## 2022-12-20 ENCOUNTER — HOSPITAL ENCOUNTER (EMERGENCY)
Facility: HOSPITAL | Age: 77
Discharge: HOME | End: 2022-12-20
Attending: EMERGENCY MEDICINE
Payer: MEDICARE

## 2022-12-20 VITALS
HEIGHT: 63 IN | BODY MASS INDEX: 33.31 KG/M2 | SYSTOLIC BLOOD PRESSURE: 140 MMHG | OXYGEN SATURATION: 99 % | HEART RATE: 66 BPM | TEMPERATURE: 98 F | RESPIRATION RATE: 18 BRPM | DIASTOLIC BLOOD PRESSURE: 90 MMHG | WEIGHT: 188 LBS

## 2022-12-20 DIAGNOSIS — R07.89 CHEST PAIN, ATYPICAL: Primary | ICD-10-CM

## 2022-12-20 DIAGNOSIS — F41.9 ANXIETY: ICD-10-CM

## 2022-12-20 DIAGNOSIS — R42 LIGHTHEADEDNESS: ICD-10-CM

## 2022-12-20 LAB
ANION GAP SERPL CALC-SCNC: 3 MMOL/L (ref 0–18)
ATRIAL RATE: 65 BPM
BASOPHILS # BLD AUTO: 0.03 X10(3) UL (ref 0–0.2)
BASOPHILS NFR BLD AUTO: 0.3 %
BUN BLD-MCNC: 17 MG/DL (ref 7–18)
BUN/CREAT SERPL: 20.7 (ref 10–20)
CALCIUM BLD-MCNC: 9.8 MG/DL (ref 8.5–10.1)
CHLORIDE SERPL-SCNC: 102 MMOL/L (ref 98–112)
CO2 SERPL-SCNC: 31 MMOL/L (ref 21–32)
CREAT BLD-MCNC: 0.82 MG/DL
DEPRECATED RDW RBC AUTO: 40.8 FL (ref 35.1–46.3)
EOSINOPHIL # BLD AUTO: 0.15 X10(3) UL (ref 0–0.7)
EOSINOPHIL NFR BLD AUTO: 1.7 %
ERYTHROCYTE [DISTWIDTH] IN BLOOD BY AUTOMATED COUNT: 12.9 % (ref 11–15)
GFR SERPLBLD BASED ON 1.73 SQ M-ARVRAT: 74 ML/MIN/1.73M2 (ref 60–?)
GLUCOSE BLD-MCNC: 99 MG/DL (ref 70–99)
HCT VFR BLD AUTO: 41.9 %
HGB BLD-MCNC: 13.8 G/DL
IMM GRANULOCYTES # BLD AUTO: 0.05 X10(3) UL (ref 0–1)
IMM GRANULOCYTES NFR BLD: 0.6 %
LYMPHOCYTES # BLD AUTO: 1.74 X10(3) UL (ref 1–4)
LYMPHOCYTES NFR BLD AUTO: 19.2 %
MCH RBC QN AUTO: 28.5 PG (ref 26–34)
MCHC RBC AUTO-ENTMCNC: 32.9 G/DL (ref 31–37)
MCV RBC AUTO: 86.4 FL
MONOCYTES # BLD AUTO: 0.6 X10(3) UL (ref 0.1–1)
MONOCYTES NFR BLD AUTO: 6.6 %
NEUTROPHILS # BLD AUTO: 6.49 X10 (3) UL (ref 1.5–7.7)
NEUTROPHILS # BLD AUTO: 6.49 X10(3) UL (ref 1.5–7.7)
NEUTROPHILS NFR BLD AUTO: 71.6 %
OSMOLALITY SERPL CALC.SUM OF ELEC: 284 MOSM/KG (ref 275–295)
P AXIS: 58 DEGREES
P-R INTERVAL: 156 MS
PLATELET # BLD AUTO: 305 10(3)UL (ref 150–450)
POTASSIUM SERPL-SCNC: 5.5 MMOL/L (ref 3.5–5.1)
Q-T INTERVAL: 386 MS
QRS DURATION: 80 MS
QTC CALCULATION (BEZET): 401 MS
R AXIS: 13 DEGREES
RBC # BLD AUTO: 4.85 X10(6)UL
SODIUM SERPL-SCNC: 136 MMOL/L (ref 136–145)
T AXIS: 7 DEGREES
TROPONIN I HIGH SENSITIVITY: 7 NG/L
VENTRICULAR RATE: 65 BPM
WBC # BLD AUTO: 9.1 X10(3) UL (ref 4–11)

## 2022-12-20 PROCEDURE — 80048 BASIC METABOLIC PNL TOTAL CA: CPT

## 2022-12-20 PROCEDURE — 84484 ASSAY OF TROPONIN QUANT: CPT | Performed by: EMERGENCY MEDICINE

## 2022-12-20 PROCEDURE — 71045 X-RAY EXAM CHEST 1 VIEW: CPT

## 2022-12-20 PROCEDURE — 99285 EMERGENCY DEPT VISIT HI MDM: CPT

## 2022-12-20 PROCEDURE — 80048 BASIC METABOLIC PNL TOTAL CA: CPT | Performed by: EMERGENCY MEDICINE

## 2022-12-20 PROCEDURE — 99284 EMERGENCY DEPT VISIT MOD MDM: CPT

## 2022-12-20 PROCEDURE — 93010 ELECTROCARDIOGRAM REPORT: CPT

## 2022-12-20 PROCEDURE — 84484 ASSAY OF TROPONIN QUANT: CPT

## 2022-12-20 PROCEDURE — 85025 COMPLETE CBC W/AUTO DIFF WBC: CPT

## 2022-12-20 PROCEDURE — 36415 COLL VENOUS BLD VENIPUNCTURE: CPT

## 2022-12-20 PROCEDURE — 85025 COMPLETE CBC W/AUTO DIFF WBC: CPT | Performed by: EMERGENCY MEDICINE

## 2022-12-20 PROCEDURE — 93005 ELECTROCARDIOGRAM TRACING: CPT

## 2022-12-20 NOTE — ED QUICK NOTES
Patient showing no signs or symptoms of any respiratory distress. Pt provided with discharge instructions. Verbalized understanding understanding of plan of care at home and follow up. All questions and concerns addressed prior to discharge.

## 2022-12-20 NOTE — ED INITIAL ASSESSMENT (HPI)
Pt. Presents to ED with a complaint of high bp. Pt reports a bp of 190/105 yesterday. Today when the pt. was driving pt experienced really bad chest pain and headache.

## 2023-01-18 RX ORDER — METOPROLOL SUCCINATE 25 MG/1
TABLET, EXTENDED RELEASE ORAL
Qty: 90 TABLET | Refills: 3 | OUTPATIENT
Start: 2023-01-18

## 2023-01-18 RX ORDER — METOPROLOL SUCCINATE 25 MG/1
25 TABLET, EXTENDED RELEASE ORAL DAILY
Qty: 90 TABLET | Refills: 3 | Status: SHIPPED | OUTPATIENT
Start: 2023-01-18

## 2023-02-06 ENCOUNTER — TELEPHONE (OUTPATIENT)
Dept: INTERNAL MEDICINE CLINIC | Facility: CLINIC | Age: 78
End: 2023-02-06

## 2023-02-06 ENCOUNTER — HOSPITAL ENCOUNTER (OUTPATIENT)
Dept: MAMMOGRAPHY | Facility: HOSPITAL | Age: 78
Discharge: HOME | End: 2023-02-06
Attending: INTERNAL MEDICINE
Payer: MEDICARE

## 2023-02-06 DIAGNOSIS — Z12.31 ENCOUNTER FOR SCREENING MAMMOGRAM FOR MALIGNANT NEOPLASM OF BREAST: ICD-10-CM

## 2023-02-06 PROCEDURE — 77067 SCR MAMMO BI INCL CAD: CPT | Performed by: INTERNAL MEDICINE

## 2023-02-06 PROCEDURE — 77063 BREAST TOMOSYNTHESIS BI: CPT | Performed by: INTERNAL MEDICINE

## 2023-02-07 NOTE — TELEPHONE ENCOUNTER
Relayed MDs message on voicemail (hipaa verified). Instructed to call the office back with any questions.

## 2023-04-13 ENCOUNTER — TELEPHONE (OUTPATIENT)
Dept: INTERNAL MEDICINE CLINIC | Facility: CLINIC | Age: 78
End: 2023-04-13

## 2023-04-13 RX ORDER — AZITHROMYCIN 250 MG/1
TABLET, FILM COATED ORAL
Qty: 6 TABLET | Refills: 0 | Status: CANCELLED | OUTPATIENT
Start: 2023-04-13 | End: 2023-04-18

## 2023-04-13 NOTE — TELEPHONE ENCOUNTER
Spoke with pt and relayed MD message and instructions below. Pt states she will monitor her symptoms for the next 3-4 days and call us back early next week if symptoms have not improved. Advised to call back sooner if any worsening symptoms, or seek more urgent evaluation. Pt verbalized understanding.      FYI to Dr. Kenneth Yanes

## 2023-04-13 NOTE — TELEPHONE ENCOUNTER
Patient returned call, please call again  Patient apologized, not sure how she missed call  Tasked to nursing

## 2023-04-13 NOTE — TELEPHONE ENCOUNTER
Pt called to schedule an appointment with   Dr Jeffrey Joseph for bronchitis/laryngitis   Symptoms started on Tuesday   Coughing up a little yellow   Pt had negative Covid test result Tuesday evening     Please call patient to triage for appointment   [phone]

## 2023-04-13 NOTE — TELEPHONE ENCOUNTER
URI Triage:    Fever: Yes[]  No[x]  Unknown[]  [x] Temperature: 97.2F  [] Chills  [] Night sweats  [] Body aches    Cough: Yes[x] No[]  [] Productive cough  [] Cough with exertion  [] Dry cough    Respiratory Symptoms: Yes[] No[x]  [] Wheezing  [] Pain with deep breathing  [] SOB with exertion   [] SOB at rest  [] Heavy breathing  [] Chest discomfort with deep breathing or coughing    GI Symptoms: Yes [] No[x]  [] Diarrhea  [] Nausea  [] Vomiting  [] Abdominal pain  [] Lack of appetite    Other symptoms:  [x] Sore throat (\"a little\")  [] Difficulty swallowing  [] Sinus pressure  [] Nasal drainage  [] Nasal congestion  [] Chest congestion  [] Head congestion  [] PND  [] Facial pain   [] Ear pain  [] Conjunctivitis  [] Headache  [] Fatigue  [] Weakness  [] Loss of sense of smell   [] Loss of sense of taste    [x]OTC Medications: Elderberry    [] Any recent travel  [] Any sick contacts    [] Positive Covid exposure (<6 feet, >15 min, no mask worn)  If yes, date of exposure (last contact):     [x] Covid Test  Date/Result: 4/11/23 negative    Vaccinated (covid): Yes  [x]   No []  Booster:  Yes  []  No [x]    Symptom onset: 4/11/23    Patient states she started with sinus symptoms (congestion), which resolved as of yesterday. She is making herself cough to bring up phlegm. She has been gargling salt water, drinking tea/honey. Reviewed recommendations for covid retest, since she tested on symptom onset. She will take another test today and notify office of result. Patient was notified that this message will be routed to the physician to determine what their recommendation (s) would be. In the meantime, if they develop new or worsening symptoms, they were advised to call back or seek emergent evaluation at urgent care. ER precautions reviewed. Reviewed business hours and the after-hour service for the on-call physician, I.e, concerns/questions.         To Dr. Vaughn Lopez: pt seeking over the phone recommendation or appointment.

## 2023-04-13 NOTE — TELEPHONE ENCOUNTER
Message and symptoms noted. We can tell patient the following    1. Please let her know with her symptoms I can go ahead and give her a Zithromax Z-SMITH    2. We usually will advise people to hold off for 3 to 4 days with her current symptoms which suggest more of a \"virus\". 3.  However if she wishes to start antibiotics sooner rather than later I pended Zithromax Z-SMITH for her.

## 2023-04-14 RX ORDER — AZITHROMYCIN 250 MG/1
TABLET, FILM COATED ORAL
Qty: 6 TABLET | Refills: 0 | Status: SHIPPED | OUTPATIENT
Start: 2023-04-14 | End: 2023-04-19

## 2023-04-14 NOTE — TELEPHONE ENCOUNTER
Patient called as requested to check in   She is feeling worse today, especially in chest. Had a rough nigh. Yellow phlegm coming up   Can ZPak be prescribed?   [facility]  Tasked to nursing

## 2023-05-11 ENCOUNTER — OFFICE VISIT (OUTPATIENT)
Dept: INTERNAL MEDICINE CLINIC | Facility: CLINIC | Age: 78
End: 2023-05-11

## 2023-05-11 ENCOUNTER — TELEPHONE (OUTPATIENT)
Dept: INTERNAL MEDICINE CLINIC | Facility: CLINIC | Age: 78
End: 2023-05-11

## 2023-05-11 ENCOUNTER — LAB ENCOUNTER (OUTPATIENT)
Dept: LAB | Age: 78
End: 2023-05-11
Attending: INTERNAL MEDICINE
Payer: MEDICARE

## 2023-05-11 VITALS
WEIGHT: 193.63 LBS | OXYGEN SATURATION: 100 % | SYSTOLIC BLOOD PRESSURE: 116 MMHG | HEIGHT: 63 IN | TEMPERATURE: 98 F | HEART RATE: 61 BPM | BODY MASS INDEX: 34.31 KG/M2 | DIASTOLIC BLOOD PRESSURE: 70 MMHG

## 2023-05-11 DIAGNOSIS — Z00.00 ROUTINE HEALTH MAINTENANCE: ICD-10-CM

## 2023-05-11 DIAGNOSIS — Z78.0 ASYMPTOMATIC POSTMENOPAUSAL STATE: ICD-10-CM

## 2023-05-11 DIAGNOSIS — L84 CALLUS BETWEEN TOES: ICD-10-CM

## 2023-05-11 DIAGNOSIS — R20.2 TINGLING OF BOTH FEET: ICD-10-CM

## 2023-05-11 DIAGNOSIS — H81.09 MENIERE'S DISEASE, UNSPECIFIED LATERALITY: ICD-10-CM

## 2023-05-11 DIAGNOSIS — I10 ESSENTIAL HYPERTENSION: ICD-10-CM

## 2023-05-11 DIAGNOSIS — E03.9 ACQUIRED HYPOTHYROIDISM: ICD-10-CM

## 2023-05-11 DIAGNOSIS — Z00.00 MEDICARE ANNUAL WELLNESS VISIT, SUBSEQUENT: Primary | ICD-10-CM

## 2023-05-11 DIAGNOSIS — N82.3 RECTOVAGINAL FISTULA: ICD-10-CM

## 2023-05-11 PROBLEM — H40.003 GLAUCOMA SUSPECT OF BOTH EYES: Status: RESOLVED | Noted: 2019-10-24 | Resolved: 2023-05-11

## 2023-05-11 PROBLEM — K57.92 ACUTE DIVERTICULITIS: Status: RESOLVED | Noted: 2018-08-06 | Resolved: 2023-05-11

## 2023-05-11 PROBLEM — K57.92 DIVERTICULITIS: Status: RESOLVED | Noted: 2018-08-21 | Resolved: 2023-05-11

## 2023-05-11 PROBLEM — N80.9 ENDOMETRIOSIS: Status: RESOLVED | Noted: 2018-09-21 | Resolved: 2023-05-11

## 2023-05-11 PROBLEM — Z90.710 HISTORY OF HYSTERECTOMY: Status: RESOLVED | Noted: 2018-09-21 | Resolved: 2023-05-11

## 2023-05-11 PROBLEM — H25.13 AGE-RELATED NUCLEAR CATARACT OF BOTH EYES: Status: RESOLVED | Noted: 2019-10-24 | Resolved: 2023-05-11

## 2023-05-11 PROBLEM — R73.03 PRE-DIABETES: Status: RESOLVED | Noted: 2017-10-20 | Resolved: 2023-05-11

## 2023-05-11 PROBLEM — H43.391 VITREOUS FLOATERS OF RIGHT EYE: Status: RESOLVED | Noted: 2019-10-24 | Resolved: 2023-05-11

## 2023-05-11 LAB
ALBUMIN SERPL-MCNC: 3.8 G/DL (ref 3.4–5)
ALBUMIN/GLOB SERPL: 1.1 {RATIO} (ref 1–2)
ALP LIVER SERPL-CCNC: 74 U/L
ALT SERPL-CCNC: 20 U/L
ANION GAP SERPL CALC-SCNC: 7 MMOL/L (ref 0–18)
AST SERPL-CCNC: 16 U/L (ref 15–37)
BASOPHILS # BLD AUTO: 0.03 X10(3) UL (ref 0–0.2)
BASOPHILS NFR BLD AUTO: 0.4 %
BILIRUB SERPL-MCNC: 0.6 MG/DL (ref 0.1–2)
BILIRUB UR QL: NEGATIVE
BUN BLD-MCNC: 14 MG/DL (ref 7–18)
BUN/CREAT SERPL: 18.9 (ref 10–20)
CALCIUM BLD-MCNC: 9.5 MG/DL (ref 8.5–10.1)
CHLORIDE SERPL-SCNC: 104 MMOL/L (ref 98–112)
CHOLEST SERPL-MCNC: 167 MG/DL (ref ?–200)
CLARITY UR: CLEAR
CO2 SERPL-SCNC: 27 MMOL/L (ref 21–32)
CREAT BLD-MCNC: 0.74 MG/DL
DEPRECATED RDW RBC AUTO: 40.8 FL (ref 35.1–46.3)
EOSINOPHIL # BLD AUTO: 0.24 X10(3) UL (ref 0–0.7)
EOSINOPHIL NFR BLD AUTO: 3.1 %
ERYTHROCYTE [DISTWIDTH] IN BLOOD BY AUTOMATED COUNT: 13 % (ref 11–15)
FASTING PATIENT LIPID ANSWER: YES
FASTING STATUS PATIENT QL REPORTED: YES
GFR SERPLBLD BASED ON 1.73 SQ M-ARVRAT: 84 ML/MIN/1.73M2 (ref 60–?)
GLOBULIN PLAS-MCNC: 3.6 G/DL (ref 2.8–4.4)
GLUCOSE BLD-MCNC: 99 MG/DL (ref 70–99)
GLUCOSE UR-MCNC: NORMAL MG/DL
HCT VFR BLD AUTO: 42 %
HDLC SERPL-MCNC: 63 MG/DL (ref 40–59)
HGB BLD-MCNC: 13.6 G/DL
HGB UR QL STRIP.AUTO: NEGATIVE
IMM GRANULOCYTES # BLD AUTO: 0.04 X10(3) UL (ref 0–1)
IMM GRANULOCYTES NFR BLD: 0.5 %
KETONES UR-MCNC: NEGATIVE MG/DL
LDLC SERPL CALC-MCNC: 86 MG/DL (ref ?–100)
LEUKOCYTE ESTERASE UR QL STRIP.AUTO: NEGATIVE
LYMPHOCYTES # BLD AUTO: 1.58 X10(3) UL (ref 1–4)
LYMPHOCYTES NFR BLD AUTO: 20.2 %
MCH RBC QN AUTO: 28 PG (ref 26–34)
MCHC RBC AUTO-ENTMCNC: 32.4 G/DL (ref 31–37)
MCV RBC AUTO: 86.4 FL
MONOCYTES # BLD AUTO: 0.48 X10(3) UL (ref 0.1–1)
MONOCYTES NFR BLD AUTO: 6.1 %
NEUTROPHILS # BLD AUTO: 5.47 X10 (3) UL (ref 1.5–7.7)
NEUTROPHILS # BLD AUTO: 5.47 X10(3) UL (ref 1.5–7.7)
NEUTROPHILS NFR BLD AUTO: 69.7 %
NITRITE UR QL STRIP.AUTO: NEGATIVE
NONHDLC SERPL-MCNC: 104 MG/DL (ref ?–130)
OSMOLALITY SERPL CALC.SUM OF ELEC: 287 MOSM/KG (ref 275–295)
PH UR: 5.5 [PH] (ref 5–8)
PLATELET # BLD AUTO: 343 10(3)UL (ref 150–450)
POTASSIUM SERPL-SCNC: 4.2 MMOL/L (ref 3.5–5.1)
PROT SERPL-MCNC: 7.4 G/DL (ref 6.4–8.2)
PROT UR-MCNC: NEGATIVE MG/DL
RBC # BLD AUTO: 4.86 X10(6)UL
SODIUM SERPL-SCNC: 138 MMOL/L (ref 136–145)
SP GR UR STRIP: 1.02 (ref 1–1.03)
TRIGL SERPL-MCNC: 99 MG/DL (ref 30–149)
TSI SER-ACNC: 2.09 MIU/ML (ref 0.36–3.74)
UROBILINOGEN UR STRIP-ACNC: NORMAL
VIT B12 SERPL-MCNC: 376 PG/ML (ref 193–986)
VIT D+METAB SERPL-MCNC: 28 NG/ML (ref 30–100)
VLDLC SERPL CALC-MCNC: 16 MG/DL (ref 0–30)
WBC # BLD AUTO: 7.8 X10(3) UL (ref 4–11)

## 2023-05-11 PROCEDURE — 85025 COMPLETE CBC W/AUTO DIFF WBC: CPT | Performed by: INTERNAL MEDICINE

## 2023-05-11 PROCEDURE — 83921 ORGANIC ACID SINGLE QUANT: CPT

## 2023-05-11 PROCEDURE — 82607 VITAMIN B-12: CPT

## 2023-05-11 PROCEDURE — 84443 ASSAY THYROID STIM HORMONE: CPT | Performed by: INTERNAL MEDICINE

## 2023-05-11 PROCEDURE — 82306 VITAMIN D 25 HYDROXY: CPT | Performed by: INTERNAL MEDICINE

## 2023-05-11 PROCEDURE — 36415 COLL VENOUS BLD VENIPUNCTURE: CPT | Performed by: INTERNAL MEDICINE

## 2023-05-11 PROCEDURE — 80061 LIPID PANEL: CPT | Performed by: INTERNAL MEDICINE

## 2023-05-11 PROCEDURE — 80053 COMPREHEN METABOLIC PANEL: CPT | Performed by: INTERNAL MEDICINE

## 2023-05-11 NOTE — TELEPHONE ENCOUNTER
Please call patient let her know that I thought her test results came out good. Her vitamin D level was a little bit low. It looks like she is taking at 1000 units a day of vitamin D. She can increase this up to 2000 units a day.

## 2023-05-18 LAB — METHYLMALONIC ACID: 165 NMOL/L

## 2023-06-07 ENCOUNTER — TELEPHONE (OUTPATIENT)
Dept: INTERNAL MEDICINE CLINIC | Facility: CLINIC | Age: 78
End: 2023-06-07

## 2023-06-07 ENCOUNTER — HOSPITAL ENCOUNTER (OUTPATIENT)
Dept: BONE DENSITY | Age: 78
Discharge: HOME | End: 2023-06-07
Attending: INTERNAL MEDICINE
Payer: MEDICARE

## 2023-06-07 DIAGNOSIS — Z78.0 ASYMPTOMATIC POSTMENOPAUSAL STATE: ICD-10-CM

## 2023-06-07 PROCEDURE — 77080 DXA BONE DENSITY AXIAL: CPT | Performed by: INTERNAL MEDICINE

## 2023-06-08 NOTE — TELEPHONE ENCOUNTER
Please let patient know that her DEXA scan showed what is called \"osteopenia\". This is osteoporosis so that is a good sign. I would just recommend over-the-counter calcium supplement of 500 mg a day. Also a vitamin D supplement over 1000 units a day. These can both be gotten over-the-counter.

## 2023-06-09 ENCOUNTER — OFFICE VISIT (OUTPATIENT)
Dept: PODIATRY CLINIC | Facility: CLINIC | Age: 78
End: 2023-06-09

## 2023-06-09 DIAGNOSIS — M79.674 TOE PAIN, BILATERAL: ICD-10-CM

## 2023-06-09 DIAGNOSIS — L60.3 NAIL DYSTROPHY: ICD-10-CM

## 2023-06-09 DIAGNOSIS — M79.675 TOE PAIN, BILATERAL: ICD-10-CM

## 2023-06-09 DIAGNOSIS — L60.0 INGROWN NAIL: Primary | ICD-10-CM

## 2023-06-09 DIAGNOSIS — B35.1 ONYCHOMYCOSIS: ICD-10-CM

## 2023-06-09 RX ORDER — AMLODIPINE BESYLATE 2.5 MG/1
2.5 TABLET ORAL DAILY
Qty: 30 TABLET | Refills: 11 | COMMUNITY
Start: 2022-12-08 | End: 2023-12-03

## 2023-06-10 NOTE — PATIENT INSTRUCTIONS
Follow up in 2-3 months for re-evaluation or sooner if other concerns arise. Can consider nail matrixectomy procedure if symptoms worsen or fail to improve.

## 2023-08-09 ENCOUNTER — OFFICE VISIT (OUTPATIENT)
Dept: PODIATRY CLINIC | Facility: CLINIC | Age: 78
End: 2023-08-09

## 2023-08-09 DIAGNOSIS — M20.41 HAMMER TOES OF BOTH FEET: ICD-10-CM

## 2023-08-09 DIAGNOSIS — M20.42 HAMMER TOES OF BOTH FEET: ICD-10-CM

## 2023-08-09 DIAGNOSIS — M79.674 TOE PAIN, BILATERAL: ICD-10-CM

## 2023-08-09 DIAGNOSIS — B35.1 ONYCHOMYCOSIS: ICD-10-CM

## 2023-08-09 DIAGNOSIS — L60.0 INGROWN NAIL: Primary | ICD-10-CM

## 2023-08-09 DIAGNOSIS — L60.3 NAIL DYSTROPHY: ICD-10-CM

## 2023-08-09 DIAGNOSIS — M79.675 TOE PAIN, BILATERAL: ICD-10-CM

## 2023-08-09 PROCEDURE — 1159F MED LIST DOCD IN RCRD: CPT | Performed by: STUDENT IN AN ORGANIZED HEALTH CARE EDUCATION/TRAINING PROGRAM

## 2023-08-09 PROCEDURE — 1160F RVW MEDS BY RX/DR IN RCRD: CPT | Performed by: STUDENT IN AN ORGANIZED HEALTH CARE EDUCATION/TRAINING PROGRAM

## 2023-08-09 PROCEDURE — 99213 OFFICE O/P EST LOW 20 MIN: CPT | Performed by: STUDENT IN AN ORGANIZED HEALTH CARE EDUCATION/TRAINING PROGRAM

## 2023-08-09 PROCEDURE — 1126F AMNT PAIN NOTED NONE PRSNT: CPT | Performed by: STUDENT IN AN ORGANIZED HEALTH CARE EDUCATION/TRAINING PROGRAM

## 2023-08-12 NOTE — PATIENT INSTRUCTIONS
Ambulate with supportive shoes and avoid walking barefoot. Can use Kerasal to affected nails. Follow-up in 2 to 3 months for reevaluation.

## 2023-09-26 ENCOUNTER — HOSPITAL ENCOUNTER (OUTPATIENT)
Dept: GENERAL RADIOLOGY | Age: 78
Discharge: HOME | End: 2023-09-26
Attending: INTERNAL MEDICINE
Payer: MEDICARE

## 2023-09-26 ENCOUNTER — TELEPHONE (OUTPATIENT)
Dept: INTERNAL MEDICINE CLINIC | Facility: CLINIC | Age: 78
End: 2023-09-26

## 2023-09-26 ENCOUNTER — OFFICE VISIT (OUTPATIENT)
Dept: INTERNAL MEDICINE CLINIC | Facility: CLINIC | Age: 78
End: 2023-09-26

## 2023-09-26 VITALS
BODY MASS INDEX: 34.95 KG/M2 | WEIGHT: 197.25 LBS | SYSTOLIC BLOOD PRESSURE: 122 MMHG | HEIGHT: 63 IN | OXYGEN SATURATION: 97 % | DIASTOLIC BLOOD PRESSURE: 80 MMHG | HEART RATE: 72 BPM

## 2023-09-26 DIAGNOSIS — M25.561 RIGHT KNEE PAIN, UNSPECIFIED CHRONICITY: ICD-10-CM

## 2023-09-26 DIAGNOSIS — M25.511 RIGHT SHOULDER PAIN, UNSPECIFIED CHRONICITY: Primary | ICD-10-CM

## 2023-09-26 DIAGNOSIS — M25.511 RIGHT SHOULDER PAIN, UNSPECIFIED CHRONICITY: ICD-10-CM

## 2023-09-26 DIAGNOSIS — E55.9 VITAMIN D DEFICIENCY: ICD-10-CM

## 2023-09-26 DIAGNOSIS — E03.9 ACQUIRED HYPOTHYROIDISM: ICD-10-CM

## 2023-09-26 DIAGNOSIS — I10 ESSENTIAL HYPERTENSION: ICD-10-CM

## 2023-09-26 DIAGNOSIS — Z00.00 ROUTINE HEALTH MAINTENANCE: ICD-10-CM

## 2023-09-26 PROCEDURE — 99214 OFFICE O/P EST MOD 30 MIN: CPT | Performed by: INTERNAL MEDICINE

## 2023-09-26 PROCEDURE — 3008F BODY MASS INDEX DOCD: CPT | Performed by: INTERNAL MEDICINE

## 2023-09-26 PROCEDURE — 1159F MED LIST DOCD IN RCRD: CPT | Performed by: INTERNAL MEDICINE

## 2023-09-26 PROCEDURE — 73560 X-RAY EXAM OF KNEE 1 OR 2: CPT | Performed by: INTERNAL MEDICINE

## 2023-09-26 PROCEDURE — 73030 X-RAY EXAM OF SHOULDER: CPT | Performed by: INTERNAL MEDICINE

## 2023-09-26 PROCEDURE — 1170F FXNL STATUS ASSESSED: CPT | Performed by: INTERNAL MEDICINE

## 2023-09-26 PROCEDURE — 3074F SYST BP LT 130 MM HG: CPT | Performed by: INTERNAL MEDICINE

## 2023-09-26 PROCEDURE — 3079F DIAST BP 80-89 MM HG: CPT | Performed by: INTERNAL MEDICINE

## 2023-09-26 RX ORDER — LEVOTHYROXINE SODIUM 0.12 MG/1
TABLET ORAL
COMMUNITY
Start: 2023-09-26

## 2023-09-26 NOTE — TELEPHONE ENCOUNTER
Please let patient know the following regarding her x-ray results    Her shoulder does show some degenerative changes. Dr. Danelle Hogan will be able to help with that I believe    2. Your knee x-ray showed that there looks to be a possible \"fragment\" of the top of the patella which is the kneecap. Not quite sure what this represents. I do not think she actually fell on it. This may be old however. There is a moderate amount of degenerative change in the knee. The radiologist also saw some of the left knee and indicated that there was \"severe\" left knee arthritis although I do not think she is having problems on the left side.     3.  We discussed in the office she should see Dr. Von Nichole who can address these problems

## 2023-09-29 NOTE — TELEPHONE ENCOUNTER
Pt called, she is scheduled to see   Dr Jarett Rossi on Oct 23     In the meantime asks for order to go to [facility] [address] in [place], they can see pt for physical therapy  Pt spoke with clinical mgr - Oseas López  Please send order to Fax# [phone]

## 2023-10-23 RX ORDER — AMLODIPINE BESYLATE 2.5 MG/1
2.5 TABLET ORAL DAILY
Qty: 30 TABLET | Refills: 11 | Status: SHIPPED | OUTPATIENT
Start: 2023-10-23 | End: 2024-10-17

## 2023-10-23 NOTE — TELEPHONE ENCOUNTER
Refill request is for a maintenance medication and has met the criteria specified in the Ambulatory Medication Refill Standing Order for eligibility, visits, laboratory, alerts and was sent to the requested pharmacy. Requested Prescriptions     Signed Prescriptions Disp Refills    amLODIPine 2.5 MG Oral Tab 30 tablet 11     Sig: Take 1 tablet (2.5 mg total) by mouth daily.      Authorizing Provider: Brandin Jarvis     Ordering User: Blessing Carrero

## 2023-11-14 ENCOUNTER — TELEPHONE (OUTPATIENT)
Dept: INTERNAL MEDICINE CLINIC | Facility: CLINIC | Age: 78
End: 2023-11-14

## 2023-11-14 ENCOUNTER — OFFICE VISIT (OUTPATIENT)
Dept: INTERNAL MEDICINE CLINIC | Facility: CLINIC | Age: 78
End: 2023-11-14

## 2023-11-14 ENCOUNTER — LAB ENCOUNTER (OUTPATIENT)
Dept: LAB | Age: 78
End: 2023-11-14
Attending: INTERNAL MEDICINE
Payer: MEDICARE

## 2023-11-14 VITALS
RESPIRATION RATE: 20 BRPM | OXYGEN SATURATION: 98 % | WEIGHT: 193 LBS | DIASTOLIC BLOOD PRESSURE: 80 MMHG | HEART RATE: 67 BPM | HEIGHT: 63 IN | SYSTOLIC BLOOD PRESSURE: 136 MMHG | TEMPERATURE: 97 F | BODY MASS INDEX: 34.2 KG/M2

## 2023-11-14 DIAGNOSIS — E55.9 VITAMIN D DEFICIENCY: ICD-10-CM

## 2023-11-14 DIAGNOSIS — M25.561 RIGHT KNEE PAIN, UNSPECIFIED CHRONICITY: ICD-10-CM

## 2023-11-14 DIAGNOSIS — I10 ESSENTIAL HYPERTENSION: Primary | ICD-10-CM

## 2023-11-14 DIAGNOSIS — H81.09 MENIERE'S DISEASE, UNSPECIFIED LATERALITY: ICD-10-CM

## 2023-11-14 DIAGNOSIS — N82.3 RECTOVAGINAL FISTULA: ICD-10-CM

## 2023-11-14 DIAGNOSIS — E03.9 ACQUIRED HYPOTHYROIDISM: ICD-10-CM

## 2023-11-14 DIAGNOSIS — Z00.00 ROUTINE HEALTH MAINTENANCE: ICD-10-CM

## 2023-11-14 DIAGNOSIS — Z12.31 VISIT FOR SCREENING MAMMOGRAM: ICD-10-CM

## 2023-11-14 DIAGNOSIS — M25.511 RIGHT SHOULDER PAIN, UNSPECIFIED CHRONICITY: ICD-10-CM

## 2023-11-14 LAB
ALBUMIN SERPL-MCNC: 4.4 G/DL (ref 3.2–4.8)
ALBUMIN/GLOB SERPL: 1.6 {RATIO} (ref 1–2)
ALP LIVER SERPL-CCNC: 68 U/L
ALT SERPL-CCNC: 17 U/L
ANION GAP SERPL CALC-SCNC: 6 MMOL/L (ref 0–18)
AST SERPL-CCNC: 18 U/L (ref ?–34)
BASOPHILS # BLD AUTO: 0.06 X10(3) UL (ref 0–0.2)
BASOPHILS NFR BLD AUTO: 0.7 %
BILIRUB SERPL-MCNC: 0.8 MG/DL (ref 0.2–1.1)
BUN BLD-MCNC: 13 MG/DL (ref 9–23)
BUN/CREAT SERPL: 14.9 (ref 10–20)
CALCIUM BLD-MCNC: 10.1 MG/DL (ref 8.7–10.4)
CHLORIDE SERPL-SCNC: 105 MMOL/L (ref 98–112)
CHOLEST SERPL-MCNC: 193 MG/DL (ref ?–200)
CO2 SERPL-SCNC: 32 MMOL/L (ref 21–32)
CREAT BLD-MCNC: 0.87 MG/DL
DEPRECATED RDW RBC AUTO: 43.1 FL (ref 35.1–46.3)
EGFRCR SERPLBLD CKD-EPI 2021: 69 ML/MIN/1.73M2 (ref 60–?)
EOSINOPHIL # BLD AUTO: 0.2 X10(3) UL (ref 0–0.7)
EOSINOPHIL NFR BLD AUTO: 2.2 %
ERYTHROCYTE [DISTWIDTH] IN BLOOD BY AUTOMATED COUNT: 13.2 % (ref 11–15)
FASTING PATIENT LIPID ANSWER: YES
FASTING STATUS PATIENT QL REPORTED: YES
GLOBULIN PLAS-MCNC: 2.7 G/DL (ref 2.8–4.4)
GLUCOSE BLD-MCNC: 102 MG/DL (ref 70–99)
HCT VFR BLD AUTO: 43.4 %
HDLC SERPL-MCNC: 66 MG/DL (ref 40–59)
HGB BLD-MCNC: 13.9 G/DL
IMM GRANULOCYTES # BLD AUTO: 0.05 X10(3) UL (ref 0–1)
IMM GRANULOCYTES NFR BLD: 0.6 %
LDLC SERPL CALC-MCNC: 111 MG/DL (ref ?–100)
LYMPHOCYTES # BLD AUTO: 1.6 X10(3) UL (ref 1–4)
LYMPHOCYTES NFR BLD AUTO: 17.6 %
MCH RBC QN AUTO: 28.4 PG (ref 26–34)
MCHC RBC AUTO-ENTMCNC: 32 G/DL (ref 31–37)
MCV RBC AUTO: 88.8 FL
MONOCYTES # BLD AUTO: 0.56 X10(3) UL (ref 0.1–1)
MONOCYTES NFR BLD AUTO: 6.2 %
NEUTROPHILS # BLD AUTO: 6.61 X10 (3) UL (ref 1.5–7.7)
NEUTROPHILS # BLD AUTO: 6.61 X10(3) UL (ref 1.5–7.7)
NEUTROPHILS NFR BLD AUTO: 72.7 %
NONHDLC SERPL-MCNC: 127 MG/DL (ref ?–130)
OSMOLALITY SERPL CALC.SUM OF ELEC: 296 MOSM/KG (ref 275–295)
PLATELET # BLD AUTO: 310 10(3)UL (ref 150–450)
POTASSIUM SERPL-SCNC: 5.4 MMOL/L (ref 3.5–5.1)
PROT SERPL-MCNC: 7.1 G/DL (ref 5.7–8.2)
RBC # BLD AUTO: 4.89 X10(6)UL
SODIUM SERPL-SCNC: 143 MMOL/L (ref 136–145)
TRIGL SERPL-MCNC: 86 MG/DL (ref 30–149)
TSI SER-ACNC: 2.04 MIU/ML (ref 0.55–4.78)
VIT D+METAB SERPL-MCNC: 36.7 NG/ML (ref 30–100)
VLDLC SERPL CALC-MCNC: 15 MG/DL (ref 0–30)
WBC # BLD AUTO: 9.1 X10(3) UL (ref 4–11)

## 2023-11-14 PROCEDURE — 1159F MED LIST DOCD IN RCRD: CPT | Performed by: INTERNAL MEDICINE

## 2023-11-14 PROCEDURE — 84443 ASSAY THYROID STIM HORMONE: CPT | Performed by: INTERNAL MEDICINE

## 2023-11-14 PROCEDURE — 1125F AMNT PAIN NOTED PAIN PRSNT: CPT | Performed by: INTERNAL MEDICINE

## 2023-11-14 PROCEDURE — 99214 OFFICE O/P EST MOD 30 MIN: CPT | Performed by: INTERNAL MEDICINE

## 2023-11-14 PROCEDURE — 3079F DIAST BP 80-89 MM HG: CPT | Performed by: INTERNAL MEDICINE

## 2023-11-14 PROCEDURE — 85025 COMPLETE CBC W/AUTO DIFF WBC: CPT | Performed by: INTERNAL MEDICINE

## 2023-11-14 PROCEDURE — 36415 COLL VENOUS BLD VENIPUNCTURE: CPT | Performed by: INTERNAL MEDICINE

## 2023-11-14 PROCEDURE — 80053 COMPREHEN METABOLIC PANEL: CPT | Performed by: INTERNAL MEDICINE

## 2023-11-14 PROCEDURE — 80061 LIPID PANEL: CPT | Performed by: INTERNAL MEDICINE

## 2023-11-14 PROCEDURE — 3075F SYST BP GE 130 - 139MM HG: CPT | Performed by: INTERNAL MEDICINE

## 2023-11-14 PROCEDURE — 3008F BODY MASS INDEX DOCD: CPT | Performed by: INTERNAL MEDICINE

## 2023-11-14 PROCEDURE — 82306 VITAMIN D 25 HYDROXY: CPT

## 2023-11-15 NOTE — TELEPHONE ENCOUNTER
Please let patient know her labs look good. ( Note to self: intermittent elevated potassium throughout the years.  False elevation when rechecked. )

## 2023-11-27 ENCOUNTER — LAB ENCOUNTER (OUTPATIENT)
Dept: LAB | Age: 78
End: 2023-11-27
Attending: INTERNAL MEDICINE
Payer: MEDICARE

## 2023-11-27 ENCOUNTER — OFFICE VISIT (OUTPATIENT)
Dept: INTERNAL MEDICINE CLINIC | Facility: CLINIC | Age: 78
End: 2023-11-27

## 2023-11-27 VITALS
TEMPERATURE: 98 F | OXYGEN SATURATION: 97 % | SYSTOLIC BLOOD PRESSURE: 138 MMHG | WEIGHT: 191 LBS | DIASTOLIC BLOOD PRESSURE: 78 MMHG | RESPIRATION RATE: 16 BRPM | HEART RATE: 60 BPM | BODY MASS INDEX: 33.84 KG/M2 | HEIGHT: 63 IN

## 2023-11-27 DIAGNOSIS — E03.9 ACQUIRED HYPOTHYROIDISM: ICD-10-CM

## 2023-11-27 DIAGNOSIS — R73.9 ELEVATED BLOOD SUGAR: ICD-10-CM

## 2023-11-27 DIAGNOSIS — R20.2 TINGLING: ICD-10-CM

## 2023-11-27 DIAGNOSIS — I10 ESSENTIAL HYPERTENSION: ICD-10-CM

## 2023-11-27 DIAGNOSIS — R00.2 PALPITATIONS: Primary | ICD-10-CM

## 2023-11-27 LAB
ANION GAP SERPL CALC-SCNC: 4 MMOL/L (ref 0–18)
ATRIAL RATE: 60 BPM
BUN BLD-MCNC: 14 MG/DL (ref 9–23)
BUN/CREAT SERPL: 16.9 (ref 10–20)
CALCIUM BLD-MCNC: 9.7 MG/DL (ref 8.7–10.4)
CHLORIDE SERPL-SCNC: 104 MMOL/L (ref 98–112)
CO2 SERPL-SCNC: 31 MMOL/L (ref 21–32)
CREAT BLD-MCNC: 0.83 MG/DL
EGFRCR SERPLBLD CKD-EPI 2021: 73 ML/MIN/1.73M2 (ref 60–?)
EST. AVERAGE GLUCOSE BLD GHB EST-MCNC: 120 MG/DL (ref 68–126)
FASTING STATUS PATIENT QL REPORTED: NO
GLUCOSE BLD-MCNC: 98 MG/DL (ref 70–99)
HBA1C MFR BLD: 5.8 % (ref ?–5.7)
MAGNESIUM SERPL-MCNC: 2.4 MG/DL (ref 1.6–2.6)
OSMOLALITY SERPL CALC.SUM OF ELEC: 288 MOSM/KG (ref 275–295)
P AXIS: -14 DEGREES
P-R INTERVAL: 164 MS
POTASSIUM SERPL-SCNC: 5.5 MMOL/L (ref 3.5–5.1)
Q-T INTERVAL: 408 MS
QRS DURATION: 78 MS
QTC CALCULATION (BEZET): 408 MS
R AXIS: 12 DEGREES
SODIUM SERPL-SCNC: 139 MMOL/L (ref 136–145)
T AXIS: 6 DEGREES
VENTRICULAR RATE: 60 BPM

## 2023-11-27 PROCEDURE — 80048 BASIC METABOLIC PNL TOTAL CA: CPT | Performed by: INTERNAL MEDICINE

## 2023-11-27 PROCEDURE — 36415 COLL VENOUS BLD VENIPUNCTURE: CPT | Performed by: INTERNAL MEDICINE

## 2023-11-27 PROCEDURE — 83735 ASSAY OF MAGNESIUM: CPT | Performed by: INTERNAL MEDICINE

## 2023-11-27 PROCEDURE — 83036 HEMOGLOBIN GLYCOSYLATED A1C: CPT | Performed by: INTERNAL MEDICINE

## 2023-11-28 ENCOUNTER — TELEPHONE (OUTPATIENT)
Dept: INTERNAL MEDICINE CLINIC | Facility: CLINIC | Age: 78
End: 2023-11-28

## 2023-11-28 DIAGNOSIS — E87.5 HYPERKALEMIA: Primary | ICD-10-CM

## 2023-11-28 NOTE — TELEPHONE ENCOUNTER
Please let patient know that her labs came out fairly acceptable except for her potassium which keeps being elevated. I think this is more of a laboratory issue and it may be that when we do labs in our building it may take some time to get over to the hospital to be run. Please ask her to go this week for repeat BMP to check her potassium. She does not have to fast.  I like her to go to the [facility] as this way the labs are done a little bit quicker. She can let the  know that she has had a problem with elevated potassium in the blood when blood seems to be drawn in an outpatient facility. Otherwise I was satisfied with her labs. Her hemoglobin A1c just came out 1/10 of a point elevated which is of no concern.

## 2023-11-29 ENCOUNTER — HOSPITAL ENCOUNTER (EMERGENCY)
Facility: HOSPITAL | Age: 78
Discharge: HOME | End: 2023-11-29
Attending: EMERGENCY MEDICINE
Payer: MEDICARE

## 2023-11-29 ENCOUNTER — APPOINTMENT (OUTPATIENT)
Dept: GENERAL RADIOLOGY | Facility: HOSPITAL | Age: 78
End: 2023-11-29
Attending: EMERGENCY MEDICINE
Payer: MEDICARE

## 2023-11-29 VITALS
HEART RATE: 66 BPM | OXYGEN SATURATION: 97 % | TEMPERATURE: 98 F | SYSTOLIC BLOOD PRESSURE: 130 MMHG | DIASTOLIC BLOOD PRESSURE: 76 MMHG | RESPIRATION RATE: 15 BRPM

## 2023-11-29 DIAGNOSIS — R00.2 PALPITATIONS: Primary | ICD-10-CM

## 2023-11-29 LAB
ANION GAP SERPL CALC-SCNC: 7 MMOL/L (ref 0–18)
ATRIAL RATE: 65 BPM
BASOPHILS # BLD AUTO: 0.05 X10(3) UL (ref 0–0.2)
BASOPHILS NFR BLD AUTO: 0.4 %
BUN BLD-MCNC: 11 MG/DL (ref 9–23)
BUN/CREAT SERPL: 12.2 (ref 10–20)
CALCIUM BLD-MCNC: 9.6 MG/DL (ref 8.7–10.4)
CHLORIDE SERPL-SCNC: 102 MMOL/L (ref 98–112)
CO2 SERPL-SCNC: 28 MMOL/L (ref 21–32)
CREAT BLD-MCNC: 0.9 MG/DL
D DIMER PPP FEU-MCNC: 0.57 UG/ML FEU (ref ?–0.77)
DEPRECATED RDW RBC AUTO: 42.2 FL (ref 35.1–46.3)
EGFRCR SERPLBLD CKD-EPI 2021: 66 ML/MIN/1.73M2 (ref 60–?)
EOSINOPHIL # BLD AUTO: 0.1 X10(3) UL (ref 0–0.7)
EOSINOPHIL NFR BLD AUTO: 0.8 %
ERYTHROCYTE [DISTWIDTH] IN BLOOD BY AUTOMATED COUNT: 13.5 % (ref 11–15)
GLUCOSE BLD-MCNC: 122 MG/DL (ref 70–99)
HCT VFR BLD AUTO: 41 %
HGB BLD-MCNC: 13.5 G/DL
IMM GRANULOCYTES # BLD AUTO: 0.05 X10(3) UL (ref 0–1)
IMM GRANULOCYTES NFR BLD: 0.4 %
LYMPHOCYTES # BLD AUTO: 1.41 X10(3) UL (ref 1–4)
LYMPHOCYTES NFR BLD AUTO: 11.1 %
MAGNESIUM SERPL-MCNC: 2.1 MG/DL (ref 1.6–2.6)
MCH RBC QN AUTO: 28.4 PG (ref 26–34)
MCHC RBC AUTO-ENTMCNC: 32.9 G/DL (ref 31–37)
MCV RBC AUTO: 86.3 FL
MONOCYTES # BLD AUTO: 0.62 X10(3) UL (ref 0.1–1)
MONOCYTES NFR BLD AUTO: 4.9 %
NEUTROPHILS # BLD AUTO: 10.52 X10 (3) UL (ref 1.5–7.7)
NEUTROPHILS # BLD AUTO: 10.52 X10(3) UL (ref 1.5–7.7)
NEUTROPHILS NFR BLD AUTO: 82.4 %
OSMOLALITY SERPL CALC.SUM OF ELEC: 285 MOSM/KG (ref 275–295)
P AXIS: 22 DEGREES
P-R INTERVAL: 170 MS
PLATELET # BLD AUTO: 387 10(3)UL (ref 150–450)
POTASSIUM SERPL-SCNC: 3.8 MMOL/L (ref 3.5–5.1)
Q-T INTERVAL: 394 MS
QRS DURATION: 82 MS
QTC CALCULATION (BEZET): 409 MS
R AXIS: 2 DEGREES
RBC # BLD AUTO: 4.75 X10(6)UL
SODIUM SERPL-SCNC: 137 MMOL/L (ref 136–145)
T AXIS: 1 DEGREES
TROPONIN I SERPL HS-MCNC: <3 NG/L
VENTRICULAR RATE: 65 BPM
WBC # BLD AUTO: 12.8 X10(3) UL (ref 4–11)

## 2023-11-29 PROCEDURE — 80048 BASIC METABOLIC PNL TOTAL CA: CPT | Performed by: EMERGENCY MEDICINE

## 2023-11-29 PROCEDURE — 36415 COLL VENOUS BLD VENIPUNCTURE: CPT

## 2023-11-29 PROCEDURE — 93005 ELECTROCARDIOGRAM TRACING: CPT

## 2023-11-29 PROCEDURE — 71045 X-RAY EXAM CHEST 1 VIEW: CPT | Performed by: EMERGENCY MEDICINE

## 2023-11-29 PROCEDURE — 83735 ASSAY OF MAGNESIUM: CPT | Performed by: EMERGENCY MEDICINE

## 2023-11-29 PROCEDURE — 93010 ELECTROCARDIOGRAM REPORT: CPT

## 2023-11-29 PROCEDURE — 85025 COMPLETE CBC W/AUTO DIFF WBC: CPT | Performed by: EMERGENCY MEDICINE

## 2023-11-29 PROCEDURE — 85379 FIBRIN DEGRADATION QUANT: CPT | Performed by: EMERGENCY MEDICINE

## 2023-11-29 PROCEDURE — 99284 EMERGENCY DEPT VISIT MOD MDM: CPT

## 2023-11-29 PROCEDURE — 99285 EMERGENCY DEPT VISIT HI MDM: CPT

## 2023-11-29 PROCEDURE — 84484 ASSAY OF TROPONIN QUANT: CPT | Performed by: EMERGENCY MEDICINE

## 2023-11-29 RX ORDER — ACETAMINOPHEN 325 MG/1
650 TABLET ORAL ONCE
Status: COMPLETED | OUTPATIENT
Start: 2023-11-29 | End: 2023-11-29

## 2023-11-29 NOTE — DISCHARGE INSTRUCTIONS
Continue home medications. See Dr. Anne Marie Clemente for follow-up. Someone from his office should be calling to help set up this appointment. Return to the ER if you develop worsening symptoms, lightheadedness or fainting, chest pain or pressure, difficulty breathing, or any emergent concerns.

## 2023-11-30 ENCOUNTER — HOSPITAL ENCOUNTER (OUTPATIENT)
Dept: CV DIAGNOSTICS | Facility: HOSPITAL | Age: 78
Discharge: HOME | End: 2023-11-30
Attending: INTERNAL MEDICINE
Payer: MEDICARE

## 2023-11-30 ENCOUNTER — HOSPITAL ENCOUNTER (OUTPATIENT)
Dept: CV DIAGNOSTICS | Facility: HOSPITAL | Age: 78
End: 2023-11-30
Attending: INTERNAL MEDICINE
Payer: MEDICARE

## 2023-11-30 DIAGNOSIS — R00.2 PALPITATIONS: ICD-10-CM

## 2023-11-30 PROCEDURE — 93247 EXT ECG>7D<15D SCAN A/R: CPT | Performed by: INTERNAL MEDICINE

## 2023-11-30 PROCEDURE — 93246 EXT ECG>7D<15D RECORDING: CPT | Performed by: INTERNAL MEDICINE

## 2023-12-01 ENCOUNTER — TELEPHONE (OUTPATIENT)
Dept: INTERNAL MEDICINE CLINIC | Facility: CLINIC | Age: 78
End: 2023-12-01

## 2023-12-01 DIAGNOSIS — R82.90 ABNORMAL URINALYSIS: Primary | ICD-10-CM

## 2023-12-01 RX ORDER — AMOXICILLIN 875 MG/1
875 TABLET, COATED ORAL 2 TIMES DAILY
Qty: 10 TABLET | Refills: 0 | Status: SHIPPED | OUTPATIENT
Start: 2023-12-01 | End: 2023-12-06

## 2023-12-01 RX ORDER — CEFADROXIL 500 MG/1
500 CAPSULE ORAL 2 TIMES DAILY
Qty: 10 CAPSULE | Refills: 0 | Status: SHIPPED | OUTPATIENT
Start: 2023-12-01 | End: 2023-12-06

## 2023-12-01 NOTE — TELEPHONE ENCOUNTER
Message and symptoms noted. My thoughts are as follows    It is always best to submit a urine and urine culture so we know what bacteria is causing any urinary symptoms. I placed order. 2.    She can do that prior to starting antibiotics that would be optimal    3. She is unable to I can treat her for UTI with Duricef to see if that helps her urinary symptoms thinking that she has a urinary tract infection. She is allergies or adverse reactions to all the antibiotics we use for UTIs. Laz Tena is related to Keflex. Under allergies Keflex is listed as \"swelling\" but no anaphylactic reactions listed therefore I believe Duricef would be acceptable. Duricef sent.     4.    I sent Chandan Lee into her pharmacy on file

## 2023-12-01 NOTE — TELEPHONE ENCOUNTER
Spoke to patient  A week ago noticed her urine smelled bad but she was more concerned about her heart and palpitations she forgot to mention it  When she was at the ER her WBCs were elevated but she didn't think anything of it    Last night \"my whole stomach and back began to hurt\"  \"I woke up so many times during the night to pee only a little bit\"  Reports she is dizzy and a little nauseous  She is pushing fluids and drinking cranberry juice. Denies blood in urine. Denies burning with urination. Reports she feels a little cold but Denies fever. She saw the cardiologist at 12:20 pm today. Had a monitor placed yesterday and he ordered for her to have a stress test but told her these symptoms could be all related to a UTI. Says she knows its not diverticulitis because it feels different. When I asked if she would be able to give a urine sample she said she is taking care of her mother who is 80.     [address]    To Dr Walter Montaño

## 2023-12-01 NOTE — TELEPHONE ENCOUNTER
Called patient relaying Javid Fortune MD RX message sent today. Patient was extremely grateful for your assistance today.

## 2023-12-01 NOTE — TELEPHONE ENCOUNTER
To DR. DOBSON - can you clarify which medication was sent to pharmacy? patient cannot leave her mother alone

## 2023-12-01 NOTE — TELEPHONE ENCOUNTER
Called patient to relay MD's message. Patient states she will not take a capsule. She never takes capsules because she is worried they will make her sick. She asked for Amoxicillin tablets.      To Dr. Sam Chambers--

## 2023-12-01 NOTE — TELEPHONE ENCOUNTER
Patient is calling last night she developed belly & back hurt, frequent urination. Patient is asking to have something called into [facility]    Patient was seen in ER yesterday she has an appointment with the cardiologist.  Alissa Becerra she had a heart monitor put on.     Phone [phone]

## 2023-12-01 NOTE — TELEPHONE ENCOUNTER
Noted.  I did send amoxicillin in for patient.   I put note to pharmacy to cancel [facility] prescription

## 2023-12-07 ENCOUNTER — TELEPHONE (OUTPATIENT)
Dept: INTERNAL MEDICINE CLINIC | Facility: CLINIC | Age: 78
End: 2023-12-07

## 2023-12-07 NOTE — TELEPHONE ENCOUNTER
Medicare Progress note from [address] signed, faxed, confirmation received, sent to scan.     Fax # [phone]

## 2023-12-22 ENCOUNTER — TELEPHONE (OUTPATIENT)
Dept: INTERNAL MEDICINE CLINIC | Facility: CLINIC | Age: 78
End: 2023-12-22

## 2023-12-22 NOTE — TELEPHONE ENCOUNTER
Please let patient know that her heart monitor came out good.  No arrhythmias.  No fast heartbeats.  Please ask her if she has seen her cardiologist Dr. Roe.  Please fax results to him.  He was with [facility]

## 2023-12-27 NOTE — TELEPHONE ENCOUNTER
[facility]  [facility] - she verbalized understanding. She saw DR. Roe but will not have stress test til January  Holter results faxed to DR. Roe at [phone]-confirmation recieved

## 2024-01-01 NOTE — TELEPHONE ENCOUNTER
Adri Schultz. Ambrose Rachel did have a repeat ultrasound of her splenic cyst October 22, 2018 which showed the size to be 5.8 x 6.5 x 5.5. In December 11, 2016 it measured 5.5 x 5.0 x 5.0 cm.   Noted in the 2016 report that the splenic cyst that the cyst had measured 6 PATIENT INFORMATION    Anticipatory guidance discussed  Add one food at a time every 3-5 days to see if tolerated  Adequate diet for breastfeeding  Avoid potential choking hazards (large, spherical, or coin shaped foods) unit  Call for decreased feeding, fever  Consider saving potentially allergenic foods (e.g. fish, egg white, wheat) until last  Make middle-of-night feeds \"brief and boring\"  Never leave unattended except in crib  Risk of falling once learns to roll  Safe sleep furniture  Sleep face up to decrease the chances of SIDS  Smoke detectors  Start solids gradually at 4-6 months    Follow-Up  - Return for your 6 month well child visit.    4 months old Health and Safety Tips - The following hyperlinks are available to access via [facility]    Parent Education from [employer]    Educación para padres sobre niños sanos    Common dosing for acetaminophen:  Acetaminophen (Tylenol) can be given every 4 hours.  Infant Drops: 160mg/5ml for  Weight 6-11 lbs 12-17 lbs 18-23 lbs 24-35 lbs   Dose 1.25 ml 2.5 ml 3.75 ml 5 ml     Additional Educational Resources:  For additional resources regarding your symptoms, diagnosis, or further health information, please visit the Online Health Resources section in [facility].

## 2024-01-04 ENCOUNTER — LAB ENCOUNTER (OUTPATIENT)
Dept: LAB | Age: 79
End: 2024-01-04
Attending: INTERNAL MEDICINE
Payer: COMMERCIAL

## 2024-01-04 ENCOUNTER — OFFICE VISIT (OUTPATIENT)
Dept: INTERNAL MEDICINE CLINIC | Facility: CLINIC | Age: 79
End: 2024-01-04
Payer: MEDICARE

## 2024-01-04 VITALS
HEART RATE: 60 BPM | HEIGHT: 63 IN | SYSTOLIC BLOOD PRESSURE: 130 MMHG | BODY MASS INDEX: 33.84 KG/M2 | DIASTOLIC BLOOD PRESSURE: 70 MMHG | WEIGHT: 191 LBS | TEMPERATURE: 98 F

## 2024-01-04 DIAGNOSIS — R00.2 PALPITATIONS: Primary | ICD-10-CM

## 2024-01-04 DIAGNOSIS — I34.0 NONRHEUMATIC MITRAL VALVE REGURGITATION: ICD-10-CM

## 2024-01-04 DIAGNOSIS — E03.9 ACQUIRED HYPOTHYROIDISM: ICD-10-CM

## 2024-01-04 DIAGNOSIS — I10 ESSENTIAL HYPERTENSION: ICD-10-CM

## 2024-01-04 DIAGNOSIS — R10.32 LEFT GROIN PAIN: ICD-10-CM

## 2024-01-04 DIAGNOSIS — M25.512 ACUTE PAIN OF LEFT SHOULDER: ICD-10-CM

## 2024-01-04 LAB
ANION GAP SERPL CALC-SCNC: 5 MMOL/L (ref 0–18)
BASOPHILS # BLD AUTO: 0.05 X10(3) UL (ref 0–0.2)
BASOPHILS NFR BLD AUTO: 0.5 %
BILIRUB UR QL: NEGATIVE
BUN BLD-MCNC: 18 MG/DL (ref 9–23)
BUN/CREAT SERPL: 21.2 (ref 10–20)
CALCIUM BLD-MCNC: 9.7 MG/DL (ref 8.7–10.4)
CHLORIDE SERPL-SCNC: 102 MMOL/L (ref 98–112)
CLARITY UR: CLEAR
CO2 SERPL-SCNC: 29 MMOL/L (ref 21–32)
CREAT BLD-MCNC: 0.85 MG/DL
DEPRECATED RDW RBC AUTO: 41.4 FL (ref 35.1–46.3)
EGFRCR SERPLBLD CKD-EPI 2021: 71 ML/MIN/1.73M2 (ref 60–?)
EOSINOPHIL # BLD AUTO: 0.22 X10(3) UL (ref 0–0.7)
EOSINOPHIL NFR BLD AUTO: 2.1 %
ERYTHROCYTE [DISTWIDTH] IN BLOOD BY AUTOMATED COUNT: 13.1 % (ref 11–15)
FASTING STATUS PATIENT QL REPORTED: NO
GLUCOSE BLD-MCNC: 101 MG/DL (ref 70–99)
GLUCOSE UR-MCNC: NORMAL MG/DL
HCT VFR BLD AUTO: 43.4 %
HGB BLD-MCNC: 14 G/DL
HGB UR QL STRIP.AUTO: NEGATIVE
IMM GRANULOCYTES # BLD AUTO: 0.04 X10(3) UL (ref 0–1)
IMM GRANULOCYTES NFR BLD: 0.4 %
KETONES UR-MCNC: NEGATIVE MG/DL
LEUKOCYTE ESTERASE UR QL STRIP.AUTO: 250
LYMPHOCYTES # BLD AUTO: 1.34 X10(3) UL (ref 1–4)
LYMPHOCYTES NFR BLD AUTO: 12.5 %
MCH RBC QN AUTO: 28.2 PG (ref 26–34)
MCHC RBC AUTO-ENTMCNC: 32.3 G/DL (ref 31–37)
MCV RBC AUTO: 87.3 FL
MONOCYTES # BLD AUTO: 0.64 X10(3) UL (ref 0.1–1)
MONOCYTES NFR BLD AUTO: 6 %
NEUTROPHILS # BLD AUTO: 8.41 X10 (3) UL (ref 1.5–7.7)
NEUTROPHILS # BLD AUTO: 8.41 X10(3) UL (ref 1.5–7.7)
NEUTROPHILS NFR BLD AUTO: 78.5 %
NITRITE UR QL STRIP.AUTO: NEGATIVE
OSMOLALITY SERPL CALC.SUM OF ELEC: 284 MOSM/KG (ref 275–295)
PH UR: 5.5 [PH] (ref 5–8)
PLATELET # BLD AUTO: 359 10(3)UL (ref 150–450)
POTASSIUM SERPL-SCNC: 5.1 MMOL/L (ref 3.5–5.1)
PROT UR-MCNC: NEGATIVE MG/DL
RBC # BLD AUTO: 4.97 X10(6)UL
SODIUM SERPL-SCNC: 136 MMOL/L (ref 136–145)
SP GR UR STRIP: 1.02 (ref 1–1.03)
TSI SER-ACNC: 0.67 MIU/ML (ref 0.55–4.78)
UROBILINOGEN UR STRIP-ACNC: NORMAL
WBC # BLD AUTO: 10.7 X10(3) UL (ref 4–11)

## 2024-01-04 PROCEDURE — 36415 COLL VENOUS BLD VENIPUNCTURE: CPT | Performed by: INTERNAL MEDICINE

## 2024-01-04 PROCEDURE — 80048 BASIC METABOLIC PNL TOTAL CA: CPT | Performed by: INTERNAL MEDICINE

## 2024-01-04 PROCEDURE — 3075F SYST BP GE 130 - 139MM HG: CPT | Performed by: INTERNAL MEDICINE

## 2024-01-04 PROCEDURE — 1125F AMNT PAIN NOTED PAIN PRSNT: CPT | Performed by: INTERNAL MEDICINE

## 2024-01-04 PROCEDURE — 1159F MED LIST DOCD IN RCRD: CPT | Performed by: INTERNAL MEDICINE

## 2024-01-04 PROCEDURE — 87147 CULTURE TYPE IMMUNOLOGIC: CPT | Performed by: INTERNAL MEDICINE

## 2024-01-04 PROCEDURE — 3008F BODY MASS INDEX DOCD: CPT | Performed by: INTERNAL MEDICINE

## 2024-01-04 PROCEDURE — 3078F DIAST BP <80 MM HG: CPT | Performed by: INTERNAL MEDICINE

## 2024-01-04 PROCEDURE — 87086 URINE CULTURE/COLONY COUNT: CPT | Performed by: INTERNAL MEDICINE

## 2024-01-04 PROCEDURE — 99214 OFFICE O/P EST MOD 30 MIN: CPT | Performed by: INTERNAL MEDICINE

## 2024-01-04 PROCEDURE — 84443 ASSAY THYROID STIM HORMONE: CPT | Performed by: INTERNAL MEDICINE

## 2024-01-04 PROCEDURE — 81001 URINALYSIS AUTO W/SCOPE: CPT | Performed by: INTERNAL MEDICINE

## 2024-01-04 PROCEDURE — 85025 COMPLETE CBC W/AUTO DIFF WBC: CPT | Performed by: INTERNAL MEDICINE

## 2024-01-04 RX ORDER — LEVOTHYROXINE SODIUM 0.12 MG/1
TABLET ORAL
Qty: 90 TABLET | Refills: 3 | Status: SHIPPED | OUTPATIENT
Start: 2024-01-04

## 2024-01-04 NOTE — PROGRESS NOTES
Codi Perdomo is a 78 year old female.  HPI:     Chief Complaint   Patient presents with    Checkup     Left shoulder pain and wants repeat urine test      Codi is here if she wishes to get a repeat urine and urine culture.  Orders in place.  She really does not have any urinary symptoms as she denies any dysuria frequency or slow stream.  She sometimes indicates she does not feel she urinates enough.  No abdominal pain or pelvic pain.    She was in the emergency room recently with palpitations.  Her white count was 12.8.  Will repeat white count and BMP.    Her mother is on hospice.  She needs to lift her mother frequently.  She feels that she strained her left shoulder.  She wishes a renewed physical therapy order to work on her left shoulder in addition to her left groin.  We did talk about x-rays.  No falls.  She did see Dr. Cabezas when she had right shoulder pain but she feels that seeing Dr. Cabezas now is not needed.  She also indicates she does not feel x-rays are needed at this point and wishes to pursue physical therapy which I think is reasonable since there is no falls.    She does have hypertension.  Good control.    She did see her cardiologist Dr. Roe.  I did read his report from December 1, 2023.  Noted is moderate mitral regurgitation on echocardiogram back 2020.  It was felt that her palpitations were controlled with beta-blocker.  She did have an Holter that did not show any atrial fibrillation or major arrhythmias.  He is getting a stress test ordered but canceled this as she could not read but the stress test was about it and got concerned about it.  I asked her to reschedule or discuss with Dr. Roe.  She does not voice any chest pains or shortness of breath today.    She is also has some left groin pain.  Not really left hip pain.  Again no falls.  Hurts to flex her left hip.  Her left shoulder hurts with movement.  The pain is located in the left anterior shoulder area.  She may  have bicipital tendinitis.  She has decreased range of motion in her left shoulder.  I again she wishes not to pursue any x-rays or orthopedic evaluation now and wishes to pursue physical therapy.  She knows that I placed x-ray orders in case she does not improve and she knows she can see Dr. Cabezas again.    He has a mammogram scheduled for February 12, 2024  Current Outpatient Medications   Medication Sig Dispense Refill    amLODIPine 2.5 MG Oral Tab Take 1 tablet (2.5 mg total) by mouth daily. 30 tablet 11    levothyroxine 125 MCG Oral Tab Take 1 tablet every day      metoprolol succinate ER 25 MG Oral Tablet 24 Hr Take 1 tablet (25 mg total) by mouth daily. 90 tablet 3    Multiple Vitamins-Minerals (OCUVITE EXTRA) Oral Tab Take by mouth.      famoTIDine 20 MG Oral Tab Take 1 tablet (20 mg total) by mouth daily.      Cholecalciferol (VITAMIN D) 1000 UNITS Oral Tab Take 1,000 Units by mouth daily.        Past Medical History:   Diagnosis Date    Acute diverticulitis 8/6/2018    Acute meniscal tear of left knee 1/5/2016    MRI done by  shows small joint effusion and moderate-sized Baker's cyst and complex medial meniscal tear.  MRI done December 9, 2015     Age-related nuclear cataract of both eyes 10/24/2019    Age-related nuclear cataract of both eyes 10/24/2019    Baker's cyst 1/5/2016    Shah cyst noted on left knee by MRI scan December 9, 2015     Displacement of lumbar intervertebral disc without myelopathy 4/13/2015    Diverticulitis 2018, 2013    Diverticulitis 2018    Diverticulitis 8/21/2018    CT scan August 6, 2018 showing acute sigmoid diverticulitis with developing phlegmon/abscess left iliac fossa.  Stable complex probably benign cystic splenic lesion measuring 7.3×5.4×7.7 cm unchanged from previous exam.  Hepatic steatosis noted.  Cholecystectomy noted.  Also noted is severe multifactorial spinal stenosis at L4-L5 and chronic wedge compression lower thoracic vertebrae.  Degenerativ     Endometriosis 9/21/2018    Esophageal reflux     Essential hypertension     Fatty liver     Foreign body in foot, left 2010    Foreign body left heel, Hallux valgus, sharp debridement, removal of a piece glass    Glaucoma suspect of both eyes 10/24/2019    Glaucoma suspect of both eyes 10/24/2019    Hearing loss 8/19/2013    History of hysterectomy 9/21/2018    HTN (hypertension)     Hypothyroidism     Meniere's disease     Osteoarthritis of right hip 6/23/2016    Pre-diabetes 10/20/2017    Pre-diabetes 10/20/2017    Primary localized osteoarthrosis, shoulder region 6/20/2013    Log Date: 04/24/2013     Rotator cuff tear, right     Splenic cyst 4/7/2015    Splenic cyst 12/23/2016    Tinnitus     Vitreous floaters of right eye 10/24/2019    Vitreous floaters of right eye 10/24/2019      Social History:  Social History     Socioeconomic History    Marital status: Single   Tobacco Use    Smoking status: Former     Packs/day: 0     Types: Cigarettes    Smokeless tobacco: Never   Vaping Use    Vaping Use: Never used   Substance and Sexual Activity    Alcohol use: Not Currently     Comment: Vodka, 2 drinks, yearly    Drug use: No   Other Topics Concern    Caffeine Concern Yes     Comment: chocolate, no per [employer]        REVIEW OF SYSTEMS:   GENERAL HEALTH:  feels well otherwise  RESPIRATORY:  Voices no shortness of breath with exertion or cough  CARDIOVASCULAR:  Voices no chest pain on exertion or shortness of breath  GI:   Voices no abdominal pain or changes of bowels   : see above   NEURO:  Voices no  headaches or dizziness    EXAM:   /70   Pulse 60   Temp 98 °F (36.7 °C) (Oral)   Ht 5' 3\" (1.6 m)   Wt 191 lb (86.6 kg)   BMI 33.83 kg/m²     GENERAL:  well developed, well nourished, in no apparent distress  LUNGS:  clear to auscultation.  Effort normal  CARDIO:  RRR without murmur.   S1 and S2 normal  GI:  good BS's,  no masses,   HSM or tenderness  EXTREMITIES : no cyanosis, clubbing or edema  MS: Left  shoulder with tenderness anteriorly.  No erythema.  Left groin without any tenderness or bulges to suggest femoral hernia.  Discomfort with flexion and rotation.    ASSESSMENT AND PLAN:     1. Palpitations  Seems to be resolved on beta-blockers.    2. Nonrheumatic mitral valve regurgitation  Followed by Dr. Roe from cardiology.  Asymptomatic  - CBC With Differential With Platelet    3. Essential hypertension  Blood pressure under satisfactory control.  - CBC With Differential With Platelet  - Basic Metabolic Panel (8)    4. Acquired hypothyroidism  She is on levothyroxine daily now.  Will check TSH.  - CBC With Differential With Platelet  - Basic Metabolic Panel (8)  - TSH W Reflex To Free T4    5. Left groin pain  X-rays if no better with physical therapy or follow-up with orthopedics  - XR HIP W OR WO PELVIS 2 OR 3 VIEWS, LEFT (CPT=73502); Future  - Physical Therapy Referral - External    6. Acute pain of left shoulder  X-rays if no better with physical therapy or follow-up with orthopedics  - XR SHOULDER, COMPLETE (MIN 2 VIEWS), LEFT (CPT=73030); Future  - Physical Therapy Referral - External    This visit was 30 minutes.  I spent 10 minutes before visit preparing and reviewing old records.  Greater than 50% of the visit was engaged in counseling and review of past data.     The patient indicates understanding of these issues and agrees to the plan.    Trino Evans MD  1/4/2024  8:24 AM

## 2024-01-05 ENCOUNTER — TELEPHONE (OUTPATIENT)
Dept: INTERNAL MEDICINE CLINIC | Facility: CLINIC | Age: 79
End: 2024-01-05

## 2024-01-05 NOTE — TELEPHONE ENCOUNTER
Please let patient know that I thought her blood and urine test results came out good.    Her thyroid level was good.  Her electrolytes were good.  Blood count good.    Please have a very small amount of bacteria in her urine that is of no clinical concern.  No treatment needed.  This bacteria can be a contaminant from the skin.    I am very happy and pleased with her results.    ( She has a hearing deficit )

## 2024-01-20 ENCOUNTER — HOSPITAL ENCOUNTER (OUTPATIENT)
Dept: GENERAL RADIOLOGY | Age: 79
Discharge: HOME | End: 2024-01-20
Attending: INTERNAL MEDICINE
Payer: MEDICARE

## 2024-01-20 DIAGNOSIS — R10.32 LEFT GROIN PAIN: ICD-10-CM

## 2024-01-20 DIAGNOSIS — M25.512 ACUTE PAIN OF LEFT SHOULDER: ICD-10-CM

## 2024-01-20 PROCEDURE — 73030 X-RAY EXAM OF SHOULDER: CPT | Performed by: INTERNAL MEDICINE

## 2024-01-20 PROCEDURE — 73502 X-RAY EXAM HIP UNI 2-3 VIEWS: CPT | Performed by: INTERNAL MEDICINE

## 2024-01-22 ENCOUNTER — TELEPHONE (OUTPATIENT)
Dept: INTERNAL MEDICINE CLINIC | Facility: CLINIC | Age: 79
End: 2024-01-22

## 2024-01-22 NOTE — TELEPHONE ENCOUNTER
S/w patient and relayed MD message.  Verbalizes understanding and agreement with tx. plan     Pt reports that she will be starting PT for LT shoulder tomorrow. If no improvement, will schedule appt. With Ortho to f/u     Paperwork faxed to Dr Paz    Pt has seen Dr Paz in past and wishes to stay with him    To Dr DOBSON  Pt requesting order for pneumonia vaccine  Please advise  Pt would also like MD feedback regarding cyst noted on  XR report

## 2024-01-22 NOTE — TELEPHONE ENCOUNTER
Please let Codi know that her left shoulder showed just some mild arthritic changes but that could be responsible for some of her left shoulder pain    Her hip joints also show some mild arthritis.  There was a fair amount of arthritis and a pelvic bone right by the bladder.  It was labeled \"severe\".  This is a bone that you feel right above the bladder.  I do not think she is having any pain over that area.    However please ask her to see Dr. Cabezas for this.      We will mail her the results and highlight the area that I would like Dr. Cabezas to be aware of.    Results in outbox.

## 2024-01-22 NOTE — TELEPHONE ENCOUNTER
The cyst that was noted on x-rays is by her \"pubic symphysis\" which is where the left and right side of the pelvic bone meet above the bladder area.    This per the radiologist seems to reflect \"arthritis\" in that area however please ask her to discuss with Dr. Luna anything else that needs to be done regarding that finding.  She can let Dr. Luna know he can see the report in the computer or she can show him the report when she gets it in the mail.

## 2024-02-05 PROBLEM — R10.32 LEFT GROIN PAIN: Status: ACTIVE | Noted: 2024-02-05

## 2024-02-05 PROBLEM — M43.16 SPONDYLOLISTHESIS AT L4-L5 LEVEL: Status: ACTIVE | Noted: 2024-02-05

## 2024-02-12 ENCOUNTER — HOSPITAL ENCOUNTER (OUTPATIENT)
Dept: MAMMOGRAPHY | Age: 79
Discharge: HOME | End: 2024-02-12
Attending: INTERNAL MEDICINE
Payer: MEDICARE

## 2024-02-12 DIAGNOSIS — Z12.31 VISIT FOR SCREENING MAMMOGRAM: ICD-10-CM

## 2024-02-12 PROCEDURE — 77067 SCR MAMMO BI INCL CAD: CPT | Performed by: INTERNAL MEDICINE

## 2024-02-12 PROCEDURE — 77063 BREAST TOMOSYNTHESIS BI: CPT | Performed by: INTERNAL MEDICINE

## 2024-02-15 ENCOUNTER — TELEPHONE (OUTPATIENT)
Dept: INTERNAL MEDICINE CLINIC | Facility: CLINIC | Age: 79
End: 2024-02-15

## 2024-02-29 ENCOUNTER — HOSPITAL ENCOUNTER (OUTPATIENT)
Dept: MRI IMAGING | Age: 79
Discharge: HOME | End: 2024-02-29
Attending: ORTHOPAEDIC SURGERY
Payer: MEDICARE

## 2024-02-29 DIAGNOSIS — R10.32 LEFT GROIN PAIN: ICD-10-CM

## 2024-02-29 DIAGNOSIS — G89.29 CHRONIC MIDLINE LOW BACK PAIN WITH LEFT-SIDED SCIATICA: ICD-10-CM

## 2024-02-29 DIAGNOSIS — M43.16 SPONDYLOLISTHESIS AT L4-L5 LEVEL: ICD-10-CM

## 2024-02-29 DIAGNOSIS — M54.42 CHRONIC MIDLINE LOW BACK PAIN WITH LEFT-SIDED SCIATICA: ICD-10-CM

## 2024-02-29 PROCEDURE — 72148 MRI LUMBAR SPINE W/O DYE: CPT | Performed by: ORTHOPAEDIC SURGERY

## 2024-05-14 ENCOUNTER — OFFICE VISIT (OUTPATIENT)
Dept: INTERNAL MEDICINE CLINIC | Facility: CLINIC | Age: 79
End: 2024-05-14

## 2024-05-14 ENCOUNTER — LAB ENCOUNTER (OUTPATIENT)
Dept: LAB | Age: 79
End: 2024-05-14
Attending: INTERNAL MEDICINE

## 2024-05-14 ENCOUNTER — TELEPHONE (OUTPATIENT)
Dept: INTERNAL MEDICINE CLINIC | Facility: CLINIC | Age: 79
End: 2024-05-14

## 2024-05-14 VITALS
DIASTOLIC BLOOD PRESSURE: 82 MMHG | OXYGEN SATURATION: 98 % | TEMPERATURE: 98 F | SYSTOLIC BLOOD PRESSURE: 148 MMHG | HEART RATE: 70 BPM

## 2024-05-14 DIAGNOSIS — Z00.00 MEDICARE ANNUAL WELLNESS VISIT, SUBSEQUENT: Primary | ICD-10-CM

## 2024-05-14 DIAGNOSIS — F43.21 GRIEF: ICD-10-CM

## 2024-05-14 DIAGNOSIS — I34.0 NONRHEUMATIC MITRAL VALVE REGURGITATION: ICD-10-CM

## 2024-05-14 DIAGNOSIS — R00.2 PALPITATIONS: ICD-10-CM

## 2024-05-14 DIAGNOSIS — E03.9 ACQUIRED HYPOTHYROIDISM: ICD-10-CM

## 2024-05-14 DIAGNOSIS — I10 ESSENTIAL HYPERTENSION: ICD-10-CM

## 2024-05-14 DIAGNOSIS — M48.061 SPINAL STENOSIS OF LUMBAR REGION WITHOUT NEUROGENIC CLAUDICATION: ICD-10-CM

## 2024-05-14 PROBLEM — R10.32 LEFT GROIN PAIN: Status: RESOLVED | Noted: 2024-02-05 | Resolved: 2024-05-14

## 2024-05-14 PROBLEM — M43.16 SPONDYLOLISTHESIS AT L4-L5 LEVEL: Status: RESOLVED | Noted: 2024-02-05 | Resolved: 2024-05-14

## 2024-05-14 LAB
ALBUMIN SERPL-MCNC: 4.4 G/DL (ref 3.2–4.8)
ALBUMIN/GLOB SERPL: 1.6 {RATIO} (ref 1–2)
ALP LIVER SERPL-CCNC: 76 U/L
ALT SERPL-CCNC: 17 U/L
ANION GAP SERPL CALC-SCNC: 6 MMOL/L (ref 0–18)
AST SERPL-CCNC: 19 U/L (ref ?–34)
BILIRUB SERPL-MCNC: 0.7 MG/DL (ref 0.2–1.1)
BUN BLD-MCNC: 14 MG/DL (ref 9–23)
BUN/CREAT SERPL: 16.1 (ref 10–20)
CALCIUM BLD-MCNC: 9.9 MG/DL (ref 8.7–10.4)
CHLORIDE SERPL-SCNC: 104 MMOL/L (ref 98–112)
CHOLEST SERPL-MCNC: 186 MG/DL (ref ?–200)
CO2 SERPL-SCNC: 30 MMOL/L (ref 21–32)
CREAT BLD-MCNC: 0.87 MG/DL
EGFRCR SERPLBLD CKD-EPI 2021: 69 ML/MIN/1.73M2 (ref 60–?)
FASTING PATIENT LIPID ANSWER: YES
FASTING STATUS PATIENT QL REPORTED: YES
GLOBULIN PLAS-MCNC: 2.8 G/DL (ref 2–3.5)
GLUCOSE BLD-MCNC: 103 MG/DL (ref 70–99)
HDLC SERPL-MCNC: 61 MG/DL (ref 40–59)
LDLC SERPL CALC-MCNC: 111 MG/DL (ref ?–100)
NONHDLC SERPL-MCNC: 125 MG/DL (ref ?–130)
OSMOLALITY SERPL CALC.SUM OF ELEC: 291 MOSM/KG (ref 275–295)
POTASSIUM SERPL-SCNC: 4.9 MMOL/L (ref 3.5–5.1)
PROT SERPL-MCNC: 7.2 G/DL (ref 5.7–8.2)
SODIUM SERPL-SCNC: 140 MMOL/L (ref 136–145)
TRIGL SERPL-MCNC: 73 MG/DL (ref 30–149)
TSI SER-ACNC: 1.05 MIU/ML (ref 0.55–4.78)
VLDLC SERPL CALC-MCNC: 12 MG/DL (ref 0–30)

## 2024-05-14 PROCEDURE — 84443 ASSAY THYROID STIM HORMONE: CPT | Performed by: INTERNAL MEDICINE

## 2024-05-14 PROCEDURE — 99214 OFFICE O/P EST MOD 30 MIN: CPT | Performed by: INTERNAL MEDICINE

## 2024-05-14 PROCEDURE — 80061 LIPID PANEL: CPT | Performed by: INTERNAL MEDICINE

## 2024-05-14 PROCEDURE — 96160 PT-FOCUSED HLTH RISK ASSMT: CPT | Performed by: INTERNAL MEDICINE

## 2024-05-14 PROCEDURE — G0439 PPPS, SUBSEQ VISIT: HCPCS | Performed by: INTERNAL MEDICINE

## 2024-05-14 PROCEDURE — 36415 COLL VENOUS BLD VENIPUNCTURE: CPT | Performed by: INTERNAL MEDICINE

## 2024-05-14 PROCEDURE — 80053 COMPREHEN METABOLIC PANEL: CPT | Performed by: INTERNAL MEDICINE

## 2024-05-14 NOTE — TELEPHONE ENCOUNTER
Please let Codi know that I thought her test results came out good.  Thyroid good.  Potassium good.  All in all I am pleased with the results

## 2024-05-14 NOTE — PROGRESS NOTES
HPI:   Codi Perdomo is a 78 year old female presents with the following problems.    Codi presents today for Medicare annual wellness visit    On [facility] this year her mother passed away.  She does have the expected grief.  She is tearful in the office but she does relate that she is not depressed.  She knows that her grief will pass.  Her mother was 99-1/2 years of age.  She knows her mother is in a \"better place\".  Now we both agreed to monitor her grief.  For now I do not believe she needs any counseling or medication.    Blood pressure was a little bit elevated but we did talk about the passing of her mother.  Repeat blood pressure was 142/82.  She does have a blood pressure cuff at home and will take blood pressure x 3 a couple days apart.    Will get updated labs.    She does have hypertension.  On therapy.    Copy and paste her MRI scan that Dr. Cabezas did below for reference.  mpression  CONCLUSION:  1. Multilevel degenerative changes of the lumbar spine, particularly at L3-L4 and L4-L5, where there is severe spinal canal stenosis. Moderate to severe spinal canal narrowing is seen at L2-L3, and there is moderate spinal canal stenosis at L1-L2.     2. Multilevel foraminal impingement as detailed above.     3. No acute osseous injury of the lumbar spine.     4. A large cystic splenic lesion is partially delineated, better characterized on prior CT imaging.     5. Distal colonic diverticulosis without MR evidence acute complication in the imaged volume.     6. Lesser incidental findings as above.           elm-remote.        Dictated by (CST): Doyle Sinha MD on 3/01/2024 at 10:25 AM      Finalized by (CST): Doyle Sinha MD on 3/01/2024 at 10:33 AM          She is known to have a splenic cyst and I do not think this needs any further imaging for now.    Yesterday she was at her car door.  She indicated that she had an episode where she felt that she had a feeling of \"gushed out of her legs\".  She  did not have any numbness or tingling.  He did not fall.  The symptoms lasted for about 5 minutes.  Now back to normal.  There is been no bladder or bowel symptoms and she did indicate Dr. Cabezas advised her to let us know if that happens.  She has a follow-up with Dr. Cabezas June 4.  She felt that her legs felt \"empty\".    Her DEXA score June 2023 showed osteopenia.  11% risk of major osteoporotic fracture and 2.3% hip fracture.    Sheis getting physical therapy.  She indicated Dr. Cabezas did recommend pain center evaluation but for now she wishes not to pursue that.      Wt Readings from Last 3 Encounters:   02/05/24 190 lb (86.2 kg)   01/04/24 191 lb (86.6 kg)   11/27/23 191 lb (86.6 kg)     There is no height or weight on file to calculate BMI.     Current Outpatient Medications   Medication Sig Dispense Refill    clobetasol 0.05 % External Solution       levothyroxine 125 MCG Oral Tab Take 1 tablet every day 90 tablet 3    amLODIPine 2.5 MG Oral Tab Take 1 tablet (2.5 mg total) by mouth daily. 30 tablet 11    metoprolol succinate ER 25 MG Oral Tablet 24 Hr Take 1 tablet (25 mg total) by mouth daily. 90 tablet 3    Multiple Vitamins-Minerals (OCUVITE EXTRA) Oral Tab Take by mouth.      famoTIDine 20 MG Oral Tab Take 1 tablet (20 mg total) by mouth daily.      Cholecalciferol (VITAMIN D) 1000 UNITS Oral Tab Take 1,000 Units by mouth daily.        Past Medical History:    Acute diverticulitis    Acute meniscal tear of left knee    MRI done by  shows small joint effusion and moderate-sized Baker's cyst and complex medial meniscal tear.  MRI done December 9, 2015     Age-related nuclear cataract of both eyes    Age-related nuclear cataract of both eyes    Baker's cyst    Shah cyst noted on left knee by MRI scan December 9, 2015     Displacement of lumbar intervertebral disc without myelopathy    Diverticulitis    Diverticulitis    Diverticulitis    CT scan August 6, 2018 showing acute sigmoid  diverticulitis with developing phlegmon/abscess left iliac fossa.  Stable complex probably benign cystic splenic lesion measuring 7.3×5.4×7.7 cm unchanged from previous exam.  Hepatic steatosis noted.  Cholecystectomy noted.  Also noted is severe multifactorial spinal stenosis at L4-L5 and chronic wedge compression lower thoracic vertebrae.  Degenerativ    Endometriosis    Esophageal reflux    Essential hypertension    Fatty liver    Foreign body in foot, left    Foreign body left heel, Hallux valgus, sharp debridement, removal of a piece glass    Glaucoma suspect of both eyes    Glaucoma suspect of both eyes    Hearing loss    History of hysterectomy    HTN (hypertension)    Hypothyroidism    Meniere's disease    Osteoarthritis of right hip    Pre-diabetes    Pre-diabetes    Primary localized osteoarthrosis, shoulder region    Log Date: 2013     Rotator cuff tear, right    Splenic cyst    Splenic cyst    Tinnitus    Vitreous floaters of right eye    Vitreous floaters of right eye      Past Surgical History:   Procedure Laterality Date    Carpal tunnel release      Cholecystectomy      Colonoscopy  2013    Dr. Rosas - repeat in     Debridement of nail(s), -2006    debridement of the med border of the left great toenail    Electrocardiogram, complete  2013    Scan to media tab 2013    Electrocardiogram, complete  2013    Scan to media tab 2013    Electrocardiogram, complete  2013    Scan to media tab 2013    Hysterectomy      Knee scope,med/lat menisectomy Left 2016    Procedure: ARTHROSCOPY KNEE LEFT;  Surgeon: Wilmer Garcia MD;  [facility]      Other      thumb surgery    Other surgical history  Carpal Tunnel both hands    Left knee torn meniscus    Removal of foreign body      Sharp debridement, removal of a piece of glass from Hallux valgus, left heel    Tonsillectomy      Total abdom hysterectomy        Family History    Problem Relation Age of Onset    Hypertension Mother     Thyroid Disorder Mother         thyroid problems    Kidney Disease Mother         Chronic kidney disease    Cancer Mother         Cancer-breast    Breast Cancer Mother 84    Heart Disease Father         CAD, father passed away in [place], he had what sounds like sudden death    Macular degeneration Father     Cancer Brother         Cancer-prostate    Diabetes Neg     Glaucoma Neg      Results for orders placed or performed in visit on 01/04/24   TSH W Reflex To Free T4   Result Value Ref Range    TSH 0.669 0.550 - 4.780 mIU/mL   CBC W/ DIFFERENTIAL   Result Value Ref Range    WBC 10.7 4.0 - 11.0 x10(3) uL    RBC 4.97 3.80 - 5.30 x10(6)uL    HGB 14.0 12.0 - 16.0 g/dL    HCT 43.4 35.0 - 48.0 %    MCV 87.3 80.0 - 100.0 fL    MCH 28.2 26.0 - 34.0 pg    MCHC 32.3 31.0 - 37.0 g/dL    RDW-SD 41.4 35.1 - 46.3 fL    RDW 13.1 11.0 - 15.0 %    .0 150.0 - 450.0 10(3)uL    Neutrophil Absolute Prelim 8.41 (H) 1.50 - 7.70 x10 (3) uL    Neutrophil Absolute 8.41 (H) 1.50 - 7.70 x10(3) uL    Lymphocyte Absolute 1.34 1.00 - 4.00 x10(3) uL    Monocyte Absolute 0.64 0.10 - 1.00 x10(3) uL    Eosinophil Absolute 0.22 0.00 - 0.70 x10(3) uL    Basophil Absolute 0.05 0.00 - 0.20 x10(3) uL    Immature Granulocyte Absolute 0.04 0.00 - 1.00 x10(3) uL    Neutrophil % 78.5 %    Lymphocyte % 12.5 %    Monocyte % 6.0 %    Eosinophil % 2.1 %    Basophil % 0.5 %    Immature Granulocyte % 0.4 %      Social History:   Social History     Socioeconomic History    Marital status: Single   Tobacco Use    Smoking status: Former     Types: Cigarettes    Smokeless tobacco: Never   Vaping Use    Vaping status: Never Used   Substance and Sexual Activity    Alcohol use: Not Currently     Comment: Vodka, 2 drinks, yearly    Drug use: No   Other Topics Concern    Caffeine Concern Yes     Comment: chocolate, no per [employer]     Social Determinants of Health     Financial Resource Strain: Patient  Declined (4/9/2024)    Received from [facility]    Overall Financial Resource Strain (CARDIA)     Difficulty of Paying Living Expenses: Patient declined   Food Insecurity: Patient Declined (4/9/2024)    Received from [facility]    Hunger Vital Sign     Worried About Running Out of Food in the Last Year: Patient declined     Ran Out of Food in the Last Year: Patient declined   Transportation Needs: Patient Declined (4/9/2024)    Received from [facility]    PRAPARE - Transportation     Lack of Transportation (Medical): Patient declined     Lack of Transportation (Non-Medical): Patient declined   Housing Stability: Patient Declined (4/9/2024)    Received from [facility]    Housing Stability Vital Sign     Unable to Pay for Housing in the Last Year: Patient declined     In the last 12 months, was there a time when you did not have a steady place to sleep or slept in a shelter (including now)?: Patient declined          REVIEW OF SYSTEMS:   GENERAL:  feels well.  No acute distress.   EYES: Voices no blurred vision or eye pain  HEENT:  Voices no nasal congestion, sinus pain or sore throat  LUNGS:  Voices no shortness of breath or cough  CARDIOVASCULAR:  Voices no chest pain or palpitations  GI:  Voices no abdominal pain, blood in stool, changes in bowels  :  Voices no dysuria or frequency  MUSCULOSKELETAL: No back pain as above.  NEURO:  Voices no headaches or dizziness  PSYCHE: Voices no depression or anxiety      EXAM:   /82   Pulse 70   Temp 98.1 °F (36.7 °C)   SpO2 98%     GENERAL:  well developed, well nourished.  in no apparent distress  SKIN:  no rashes.    HEENT:  Atraumatic.    pharynx without exudate or erythema  EYES:  PERRL . conjunctiva clear.  NECK:  supple, no adenopathy, thyroid normal  BREAST:  no dominant or suspicious masses, nipple discharge, or axillary adenopathy B/L  LUNGS:  clear to auscultation.  Effort normal  CARDIO:  RRR without murmur.  S1 and S2 normal.   GI:  good BS's. no masses, organomegaly or tenderness   RECTAL: Colonoscopy was October 2022  EXTREMITIES:  no cyanosis, clubbing or edema.  NEURO:  Awake and aware.  motor strength in her legs is good.  Strong lower extremity motor power bilaterally.  Reflexes 1 + knees  bilaterally      General Health     In the past six months, have you lost more than 10 pounds without trying?: 2 - No    Has your appetite been poor?: No    Type of Diet: Balanced    How does the patient maintain a good energy level?: Appropriate Exercise;Stretching    How would you describe your daily physical activity?: Moderate    How would you describe your current health state?: Fair    How do you maintain positive mental well-being?: Social Interaction;Visiting Friends;Visiting Family         Have you had any immunizations at another office such as Influenza, Hepatitis B, Tetanus, or Pneumococcal?: Yes     Functional Ability     Bathing or Showering: Able without help    Toileting: Able without help    Dressing: Able without help    Eating: Able without help    Driving: Able without help    Preparing your meals: Able without help    Managing money/bills: Able without help    Taking medications as prescribed: Able without help    Are you able to afford your medications?: Yes    Hearing Problems?: Yes     Functional Status     Hearing Problems?: Yes    Vision Problems? : Yes    Difficulty walking?: No    Difficulty dressing or bathing?: No    Problems with daily activities? : No    Memory Problems?: No      Fall/Risk Assessment                                                              Depression Screening (PHQ-2/PHQ-9): Over the LAST 2 WEEKS                      Advance Directives     Do you have a healthcare power of ?: Yes    Do you have a living will?: Yes     Hearing Assessment (Required for AWV/SWV)      Hearing Screening    Screening Method: Whisper Test  Whisper  Test Result: Fail         Visual Acuity                   Cognitive Assessment     What day of the week is this?: Correct    What month is it?: Correct    What year is it?: Correct    Recall \"Ball\": Correct    Recall \"Flag\": Correct    Recall \"Tree\": Correct         Codi Perdomo's SCREENING SCHEDULE   Tests on this list are recommended by your physician but may not be covered, or covered at this frequency, by your insurer. Please check with your insurance carrier before scheduling to verify coverage.   PREVENTATIVE SERVICES  INDICATIONS AND SCHEDULE Internal Lab or Procedure External Lab or Procedure   Diabetes Screening      HbgA1C   Annually HEMOGLOBIN A1C (%)   Date Value   12/16/2016 6.0 (A)     HgbA1C (%)   Date Value   11/27/2023 5.8 (H)         No data to display                Fasting Blood Sugar (FSB)Annually Glucose (mg/dL)   Date Value   01/04/2024 101 (H)   12/16/2015 102 (H)     GLUCOSE (mg/dL)   Date Value   04/21/2011 88         No data to display               Cardiovascular Disease Screening     LDL Annually LDL Cholesterol (mg/dL)   Date Value   11/14/2023 111 (H)     LDL-CHOLESTEROL (mg/dL (calc))   Date Value   04/21/2011 100        EKG One Time     Colorectal Cancer Screening      Colonoscopy Screen every 10 years Health Maintenance   Topic Date Due    Colorectal Cancer Screening  Discontinued    Update Health Maintenance if applicable    Flex Sigmoidoscopy Screen every 5 years No results found for this or any previous visit.      No data to display                 Fecal Occult Blood Annually No results found for: \"FOB\", \"OCCULTSTOOL\"      No data to display                Glaucoma Screening      Ophthalmology Visit Annually     Bone Density Screening      Dexascan Every two years Last Dexa Scan:    XR DEXA BONE DENSITOMETRY (CPT=77080) 06/07/2023        No data to display               Pap and Pelvic      Pap: Every 3 yrs age 21-65 or Pap+HPV every 5 yrs age 30-65, age 65 and older at high  risk   No recommendations at this time Update Health Maintenance if applicable    Chlamydia  Annually if high risk No results found for: \"CHLAMYDIA\"      No data to display                Screening Mammogram      Mammogram  Annually Health Maintenance   Topic Date Due    Mammogram  Discontinued    Update Health Maintenance if applicable   Immunizations      Influenza No orders found for this or any previous visit. Update Immunization Activity if applicable    Pneumococcal No orders found for this or any previous visit. Update Immunization Activity if applicable    Hepatitis B No orders found for this or any previous visit. Update Immunization Activity if applicable    Tetanus No orders found for this or any previous visit. Update Immunization Activity if applicable    Zoster (Not covered by Medicare Part B) No orders found for this or any previous visit. Update Immunization Activity if applicable     SPECIFIC DISEASE MONITORING Internal Lab or Procedure External Lab or Procedure   Annual Monitoring of Persistent     Medications (ACE/ARB, digoxin, diuretics)    Potassium  Annually Potassium (mmol/L)   Date Value   01/04/2024 5.1   12/16/2015 5.70 (H)     POTASSIUM (mmol/L)   Date Value   04/21/2011 4.7     POTASSIUM (P) (mmol/L)   Date Value   06/23/2016 4.8         No data to display                Creatinine  Annually CREATININE (mg/dL)   Date Value   04/21/2011 0.82     Creatinine (mg/dL)   Date Value   01/04/2024 0.85   12/16/2015 0.90         No data to display                Digoxin Serum Conc  Annually No results found for: \"DIGOXIN\"      No data to display                Diabetes      HgbA1C  Annually HEMOGLOBIN A1C (%)   Date Value   12/16/2016 6.0 (A)     HgbA1C (%)   Date Value   11/27/2023 5.8 (H)         No data to display                Creat/alb ratio  Annually      LDL  Annually LDL Cholesterol (mg/dL)   Date Value   11/14/2023 111 (H)     LDL-CHOLESTEROL (mg/dL (calc))   Date Value   04/21/2011 100          No data to display                 Dilated Eye exam  Annually     10/24/2019     8:39 AM   Data entered on:   Last Dilated Eye Exam 10/24/2019          No data to display                COPD      Spirometry Testing Annually No results found for this or any previous visit.      No data to display                       ASSESSMENT AND PLAN:         1. Medicare annual wellness visit, subsequent  Medicare annual wellness evaluation    2. Palpitations  No complaints of palpitations today.    3. Nonrheumatic mitral valve regurgitation  Asymptomatic.  She has seen cardiology in the past.    4. Essential hypertension  Blood pressure mildly elevated.  Continue current therapy.  She will check her blood pressure x 3 at home and let us know the results.  - Comp Metabolic Panel (14)  - Lipid Panel  - TSH W Reflex To Free T4    5. Acquired hypothyroidism  On levothyroxine.  Check TSH  - Comp Metabolic Panel (14)  - Lipid Panel  - TSH W Reflex To Free T4    6. Grief  Expected grief reaction from the passing of her mother.  Will observe.  For now both of us agree she does not need any counseling or antidepressants.    7. Spinal stenosis of lumbar region without neurogenic claudication  Spinal analysis.  MRI scan as above done March 1 2024.  She is in therapy.  She knows to watch out for alarm symptoms such as bladder or bowel problems.  The episode yesterday etiology is unclear but she is back to normal now.  No complaints of any gait problems.  Lower extremity motor power appears adequate.  She will call if symptoms recur.          General Health     In the past six months, have you lost more than 10 pounds without trying?: 2 - No    Has your appetite been poor?: No    Type of Diet: Balanced    How does the patient maintain a good energy level?: Appropriate Exercise;Stretching    How would you describe your daily physical activity?: Moderate    How would you describe your current health state?: Fair    How do you maintain  positive mental well-being?: Social Interaction;Visiting Friends;Visiting Family         Have you had any immunizations at another office such as Influenza, Hepatitis B, Tetanus, or Pneumococcal?: Yes     Functional Ability     Bathing or Showering: Able without help    Toileting: Able without help    Dressing: Able without help    Eating: Able without help    Driving: Able without help    Preparing your meals: Able without help    Managing money/bills: Able without help    Taking medications as prescribed: Able without help    Are you able to afford your medications?: Yes    Hearing Problems?: Yes     Functional Status     Hearing Problems?: Yes    Vision Problems? : Yes    Difficulty walking?: No    Difficulty dressing or bathing?: No    Problems with daily activities? : No    Memory Problems?: No      Fall/Risk Assessment                                                              Depression Screening (PHQ-2/PHQ-9): Over the LAST 2 WEEKS                      Advance Directives     Do you have a healthcare power of ?: Yes    Do you have a living will?: Yes     Hearing Assessment (Required for AWV/SWV)      Hearing Screening    Screening Method: Whisper Test  Whisper Test Result: Fail         Visual Acuity                   Cognitive Assessment     What day of the week is this?: Correct    What month is it?: Correct    What year is it?: Correct    Recall \"Ball\": Correct    Recall \"Flag\": Correct    Recall \"Tree\": Correct        Codi Perdomo's SCREENING SCHEDULE   Tests on this list are recommended by your physician but may not be covered, or covered at this frequency, by your insurer. Please check with your insurance carrier before scheduling to verify coverage.   PREVENTATIVE SERVICES  INDICATIONS AND SCHEDULE Internal Lab or Procedure External Lab or Procedure   Diabetes Screening      HbgA1C   Annually HEMOGLOBIN A1C (%)   Date Value   12/16/2016 6.0 (A)     HgbA1C (%)   Date Value   11/27/2023 5.8  (H)         No data to display                Fasting Blood Sugar (FSB)Annually Glucose (mg/dL)   Date Value   01/04/2024 101 (H)   12/16/2015 102 (H)     GLUCOSE (mg/dL)   Date Value   04/21/2011 88         No data to display               Cardiovascular Disease Screening     LDL Annually LDL Cholesterol (mg/dL)   Date Value   11/14/2023 111 (H)     LDL-CHOLESTEROL (mg/dL (calc))   Date Value   04/21/2011 100        EKG One Time     Colorectal Cancer Screening      Colonoscopy Screen every 10 years Health Maintenance   Topic Date Due    Colorectal Cancer Screening  Discontinued    Update Health Maintenance if applicable    Flex Sigmoidoscopy Screen every 5 years No results found for this or any previous visit.      No data to display                 Fecal Occult Blood Annually No results found for: \"FOB\", \"OCCULTSTOOL\"      No data to display                Glaucoma Screening      Ophthalmology Visit Annually     Bone Density Screening      Dexascan Every two years Last Dexa Scan:    XR DEXA BONE DENSITOMETRY (CPT=77080) 06/07/2023        No data to display               Pap and Pelvic      Pap: Every 3 yrs age 21-65 or Pap+HPV every 5 yrs age 30-65, age 65 and older at high risk   No recommendations at this time Update Health Maintenance if applicable    Chlamydia  Annually if high risk No results found for: \"CHLAMYDIA\"      No data to display                Screening Mammogram      Mammogram  Annually Health Maintenance   Topic Date Due    Mammogram  Discontinued    Update Health Maintenance if applicable   Immunizations      Influenza No orders found for this or any previous visit. Update Immunization Activity if applicable    Pneumococcal No orders found for this or any previous visit. Update Immunization Activity if applicable    Hepatitis B No orders found for this or any previous visit. Update Immunization Activity if applicable    Tetanus No orders found for this or any previous visit. Update Immunization  Activity if applicable    Zoster (Not covered by Medicare Part B) No orders found for this or any previous visit. Update Immunization Activity if applicable     SPECIFIC DISEASE MONITORING Internal Lab or Procedure External Lab or Procedure   Annual Monitoring of Persistent     Medications (ACE/ARB, digoxin, diuretics)    Potassium  Annually Potassium (mmol/L)   Date Value   01/04/2024 5.1   12/16/2015 5.70 (H)     POTASSIUM (mmol/L)   Date Value   04/21/2011 4.7     POTASSIUM (P) (mmol/L)   Date Value   06/23/2016 4.8         No data to display                Creatinine  Annually CREATININE (mg/dL)   Date Value   04/21/2011 0.82     Creatinine (mg/dL)   Date Value   01/04/2024 0.85   12/16/2015 0.90         No data to display                Digoxin Serum Conc  Annually No results found for: \"DIGOXIN\"      No data to display                Diabetes      HgbA1C  Annually HEMOGLOBIN A1C (%)   Date Value   12/16/2016 6.0 (A)     HgbA1C (%)   Date Value   11/27/2023 5.8 (H)         No data to display                Creat/alb ratio  Annually      LDL  Annually LDL Cholesterol (mg/dL)   Date Value   11/14/2023 111 (H)     LDL-CHOLESTEROL (mg/dL (calc))   Date Value   04/21/2011 100         No data to display                 Dilated Eye exam  Annually     10/24/2019     8:39 AM   Data entered on:   Last Dilated Eye Exam 10/24/2019          No data to display                COPD      Spirometry Testing Annually No results found for this or any previous visit.      No data to display                  Problem list below noted.  Stable.  Will be followed.     HTN (hypertension)  Hypothyroidism  Hyperlipidemia  Spinal stenosis  Low back pain  Meniere's disease-stable.  Will follow.    Follow-up in 6 months.  Sooner if needed.    Trino Evans MD  5/14/2024  8:28 AM

## 2024-05-31 ENCOUNTER — TELEPHONE (OUTPATIENT)
Dept: INTERNAL MEDICINE CLINIC | Facility: CLINIC | Age: 79
End: 2024-05-31

## 2024-05-31 ENCOUNTER — HOSPITAL ENCOUNTER (EMERGENCY)
Facility: HOSPITAL | Age: 79
Discharge: HOME | End: 2024-05-31
Attending: EMERGENCY MEDICINE
Payer: MEDICARE

## 2024-05-31 VITALS
BODY MASS INDEX: 33.66 KG/M2 | DIASTOLIC BLOOD PRESSURE: 72 MMHG | OXYGEN SATURATION: 99 % | TEMPERATURE: 98 F | HEART RATE: 67 BPM | SYSTOLIC BLOOD PRESSURE: 169 MMHG | HEIGHT: 63 IN | WEIGHT: 190 LBS | RESPIRATION RATE: 18 BRPM

## 2024-05-31 DIAGNOSIS — M79.604 RIGHT LEG PAIN: Primary | ICD-10-CM

## 2024-05-31 PROCEDURE — 99282 EMERGENCY DEPT VISIT SF MDM: CPT

## 2024-05-31 NOTE — ED PROVIDER NOTES
Patient Seen in: [facility] Emergency Department      History     Chief Complaint   Patient presents with    Leg Pain     Stated Complaint: Leg Pain    Subjective:   HPI        Objective:   No pertinent past medical history.            No pertinent past surgical history.              No pertinent social history.            Review of Systems    Positive for stated complaint: Leg Pain  Other systems are as noted in HPI.  Constitutional and vital signs reviewed.      All other systems reviewed and negative except as noted above.    Physical Exam     ED Triage Vitals [05/31/24 1127]   BP (!) 169/72   Pulse 67   Resp 18   Temp 97.6 °F (36.4 °C)   Temp src Temporal   SpO2 99 %   O2 Device None (Room air)       Current Vitals:   Vital Signs  BP: (!) 169/72  Pulse: 67  Resp: 18  Temp: 97.6 °F (36.4 °C)  Temp src: Temporal    Oxygen Therapy  SpO2: 99 %  O2 Device: None (Room air)            Physical Exam          ED Course   Labs Reviewed - No data to display                   MDM      78-year-old female with a history of hypertension, hypothyroidism, Baker's cyst, and various arthralgias presents today with right lower extremity pain.  Patient states that over the last 10 days to 2 weeks, she has had intermittent pain on the anterolateral right shin.  Also reporting that she saw a pink spot in the area which has resolved.  Denies any specific injury, swelling, numbness, chest pain, shortness of breath, fevers, or other symptoms.  She went to a physical therapy appointment today and was referred for further evaluation.    On exam, hypertensive with otherwise normal vitals, well-appearing, asymptomatic.  Right lower extremity without appreciable swelling or edema.  No significant point tenderness to palpation.  No visible erythema or induration.  No tenderness along the deep venous system.  Pulses intact.    Differential: Muscular strain/spasm, considered but less likely DVT or cellulitis.    No indication for  further emergency workup.  Patient advised to follow-up with her PMD for ongoing pain concerns and given careful return precautions.                                       MDM    Disposition and Plan     Clinical Impression:  1. Right leg pain         Disposition:  Discharge  5/31/2024 11:42 am    Follow-up:  Trino Evans MD  [address] [place] [id number]  [phone]    Follow up in 1 week(s)  As needed    We recommend that you schedule follow up care with a primary care provider within the next three months to obtain basic health screening including reassessment of your blood pressure.      Medications Prescribed:  Discharge Medication List as of 5/31/2024 12:18 PM

## 2024-05-31 NOTE — TELEPHONE ENCOUNTER
Patient's right leg/foot, area above her ankle was bothering her last weekend  Area was red/painful  Felt electric type shocks     Physical therapist felt area was warm & did not want patient to continue with her therapy today   Recommending that she go to the ER        Requests call back from nursing to discuss/advise if she does need to go to ER   [phone]

## 2024-05-31 NOTE — TELEPHONE ENCOUNTER
Spoke to patient  Recently returned from [place], was there for 7 days    When she first got to [place], felt a stinging pain, \"like electric hitting my leg\"  Noticed a light red spot about 4 inches above her right ankle, felt a little warm--the stinging pain was localized to this spot  Did a lot of walking while in [place]  Wednesday she came home and rested and redness went away    Last night she noticed slight swelling to both of her ankles and felt the stinging sensation return to above her right ankle    \"Today so far I dont see the pink [place]. I dont feel the pain yet\"  Claims there is no swelling present to her ankles today    She went to PT today and the therapist felt the area was warm and did not want her to continue with therapy. They advised she go to the ER.    Codi also tells me about an episode she had about two weeks ago. Got out of car to go to therapy, \"legs just didn't work, gave out\"  She sat on the curb for about 5 minutes and then was able to get up and go to therapy.  Her next appt to see ortho is next Thursday    She denies chest pain  She denies shortness of breath    Asks for Dr Evans's recommendation on what she should do

## 2024-05-31 NOTE — TELEPHONE ENCOUNTER
Patient calling back, she does not want to wait any longer and is going to [facility] ER

## 2024-05-31 NOTE — ED INITIAL ASSESSMENT (HPI)
Patient arrived ambulatory to ED, A/O x 4, with complaints of right leg pain.    Patient states that she noticed a painful pink spot on her lower right leg that started around 10 days ago. Patient also reports area is warm and swollen. Sent by PCP for further evaluation

## 2024-06-03 ENCOUNTER — HOSPITAL ENCOUNTER (OUTPATIENT)
Age: 79
Discharge: HOME | End: 2024-06-03
Payer: MEDICARE

## 2024-06-03 ENCOUNTER — APPOINTMENT (OUTPATIENT)
Dept: GENERAL RADIOLOGY | Age: 79
End: 2024-06-03
Attending: PHYSICIAN ASSISTANT
Payer: MEDICARE

## 2024-06-03 ENCOUNTER — APPOINTMENT (OUTPATIENT)
Dept: ULTRASOUND IMAGING | Age: 79
End: 2024-06-03
Attending: PHYSICIAN ASSISTANT
Payer: MEDICARE

## 2024-06-03 VITALS
HEART RATE: 67 BPM | TEMPERATURE: 98 F | SYSTOLIC BLOOD PRESSURE: 145 MMHG | RESPIRATION RATE: 18 BRPM | DIASTOLIC BLOOD PRESSURE: 71 MMHG | OXYGEN SATURATION: 96 %

## 2024-06-03 DIAGNOSIS — I87.2 VENOUS INSUFFICIENCY OF LEG: Primary | ICD-10-CM

## 2024-06-03 PROCEDURE — 99213 OFFICE O/P EST LOW 20 MIN: CPT

## 2024-06-03 PROCEDURE — 99214 OFFICE O/P EST MOD 30 MIN: CPT

## 2024-06-03 PROCEDURE — 93971 EXTREMITY STUDY: CPT | Performed by: PHYSICIAN ASSISTANT

## 2024-06-03 PROCEDURE — 73590 X-RAY EXAM OF LOWER LEG: CPT | Performed by: PHYSICIAN ASSISTANT

## 2024-06-03 NOTE — TELEPHONE ENCOUNTER
S/w patient and relayed MD message.  Verbalizes understanding and agreement with tx. plan Pt will go to [facility] today.

## 2024-06-03 NOTE — TELEPHONE ENCOUNTER
Please advise - called Oseas from [facility]- patient had swelling in lower right leg ,calf tenderness - he sent her to ER to rule out DVD - they evaluated patient in the hallway - no tests were done.Oseas states when he sent patient the calf was tender to touch -it has gotten better - he wants to know if he should continue Physical Therapy or wait until swelling goes down - to

## 2024-06-03 NOTE — TELEPHONE ENCOUNTER
Called [facility] and spoke with Oseas. Relayed Dr message. Pt is still currently with him and he stated she is aware to go to [facility].

## 2024-06-03 NOTE — TELEPHONE ENCOUNTER
As EITANI to  -patient was seen at  - X-ray Tibia and Fibula right leg US venous doppler -negative for DVD  Diagnosis : venous insufficiency

## 2024-06-03 NOTE — ED PROVIDER NOTES
Chief Complaint   Patient presents with    Leg Pain       HPI:     Codi Perdomo is a 78 year old female who presents for evaluation of right lower leg pain over the last 2 weeks.  Patient denies particular injury he had notes pain developed after getting out of the vehicle over 2 weeks ago.  Patient states was seen in the ER 4 days ago after taking over-the-counter ibuprofen with mild improvement.  Notes no imaging were advised and discharged home back to baseline PT for lower back issues.  Patient states that she spoke with her doctor and her therapist with recommendations for further evaluation.  Denies previous history of orthopedic osteoarthritis issues of the lower extremity venous insufficiency DVT or peripheral arterial disease.  Denies headache dizziness fevers chills neck pain chest pain shortness of breath abdominal pain back pain urinary retention incontinence dysuria hematuria upper or lower extremity numbness swelling or weakness.      PFSH    PFSH asessment screens reviewed and agree.  Nurses notes reviewed I agree with documentation.    Family History   Problem Relation Age of Onset    Hypertension Mother     Thyroid Disorder Mother         thyroid problems    Kidney Disease Mother         Chronic kidney disease    Cancer Mother         Cancer-breast    Breast Cancer Mother 84    Heart Disease Father         CAD, father passed away in [place], he had what sounds like sudden death    Macular degeneration Father     Cancer Brother         Cancer-prostate    Diabetes Neg     Glaucoma Neg      Family history reviewed with patient/caregiver and is not pertinent to presenting problem.  Social History     Socioeconomic History    Marital status: Single     Spouse name: Not on file    Number of children: Not on file    Years of education: Not on file    Highest education level: Not on file   Occupational History    Not on file   Tobacco Use    Smoking status: Former     Types: Cigarettes    Smokeless  tobacco: Never   Vaping Use    Vaping status: Never Used   Substance and Sexual Activity    Alcohol use: Not Currently     Comment: Vodka, 2 drinks, yearly    Drug use: No    Sexual activity: Not on file   Other Topics Concern     Service Not Asked    Blood Transfusions Not Asked    Caffeine Concern Yes     Comment: chocolate, no per [employer]    Occupational Exposure Not Asked    Hobby Hazards Not Asked    Sleep Concern Not Asked    Stress Concern Not Asked    Weight Concern Not Asked    Special Diet Not Asked    Back Care Not Asked    Exercise Not Asked    Bike Helmet Not Asked    Seat Belt Not Asked    Self-Exams Not Asked   Social History Narrative    Not on file     Social Determinants of Health     Financial Resource Strain: Patient Declined (4/9/2024)    Received from [facility]    Overall Financial Resource Strain (CARDIA)     Difficulty of Paying Living Expenses: Patient declined   Food Insecurity: Patient Declined (4/9/2024)    Received from [facility]    Hunger Vital Sign     Worried About Running Out of Food in the Last Year: Patient declined     Ran Out of Food in the Last Year: Patient declined   Transportation Needs: Patient Declined (4/9/2024)    Received from [facility]    PRAPARE - Transportation     Lack of Transportation (Medical): Patient declined     Lack of Transportation (Non-Medical): Patient declined   Physical Activity: Not on file   Stress: Not on file   Social Connections: Not on file   Housing Stability: Patient Declined (4/9/2024)    Received from [facility]    Housing Stability Vital Sign     Unable to Pay for Housing in the Last Year: Patient declined     Number of Places Lived in the Last Year: Not on file     In the last 12 months, was there a time when you did not have a steady place to sleep or slept in a shelter (including now)?: Patient declined         ROS:   Positive for stated  complaint: Lower leg pain.  All other systems reviewed and negative except as noted above.  Constitutional and Vital Signs Reviewed.      Physical Exam:     Findings:    /71   Pulse 67   Temp 97.9 °F (36.6 °C) (Temporal)   Resp 18   SpO2 96%   GENERAL: well developed, well nourished, well hydrated, no distress  SKIN: good skin turgor, no obvious rashes  EXTREMITIES: Diffuse varicosities bilateral lower leg and foot.  No pitting edema.  Mild tenderness along the right anterior lateral proximal shin.  No erythema or warmth.  Normal Madhuri test.  DP pulse and distal sensation intact.  No cyanosis or edema.  No lumbar paralumbar tenderness, normal straight leg raise bilaterally.  VILLARREAL without difficulty  GI: No palpable mass or bruit.  Soft, non-tender, normal bowel sounds  HEAD: normocephalic, atraumatic  EYES: sclera non icteric bilateral, conjunctiva clear  NOSE: nasal turbinates: pink, normal mucosa  NEURO: No focal deficits  PSYCH: Alert and oriented x3.  Answering questions appropriately.  Mood appropriate.    MDM/Assessment/Plan:   Orders for this encounter:    Orders Placed This Encounter    XR TIBIA + FIBULA (2 VIEWS), RIGHT (CPT=73590)     Order Specific Question:   What is the Relevant Clinical Indication / Reason for Exam?     Answer:   leg vein pain     Order Specific Question:   Release to patient     Answer:   Immediate    US VENOUS DOPPLER LEG RIGHT - DIAG IMG (CPT=93971)     Order Specific Question:   What is the Relevant Clinical Indication / Reason for Exam?     Answer:   pain, r/o DVT     Order Specific Question:   Release to patient     Answer:   Immediate       Labs performed this visit:  No results found for this or any previous visit (from the past 10 hour(s)).    MDM:  Xray shows no acute process. US negative DVT. Patient agrees to readdress outpatient through primary/orthopedic. Exam and hx suggestive of venous insufficiency vs musculoskeletal concern.     Diagnosis:    ICD-10-CM    1.  Venous insufficiency of leg  I87.2           All results reviewed and discussed with patient.  See AVS for detailed discharge instructions for your condition today.    Follow Up with:  Trino Evans MD  [address] [place] [phone] [phone]    Schedule an appointment as soon as possible for a visit in 1 week

## 2024-06-03 NOTE — TELEPHONE ENCOUNTER
Oseas from [facility] calling.  Patient is at their facility at this time for physical therapy.  Was in ED 5/31/2024 for leg pain. No tests done at visit.    Patient still having same symptoms.  Some pain in calf and warm to touch.    Oseas is calling for advise.    Best call back number  [phone]

## 2024-06-03 NOTE — ED INITIAL ASSESSMENT (HPI)
Presents with pain and tingling sensation to lateral aspect of RLE and heel for about 1 week. No trauma. States she was caring for her mother and doing a lot of lifting and moving. Area was \"pink\" and warm to touch several days ago, now resolved. Seen in the ED 5/31. Wearing compression socks. No swelling present.

## 2024-06-03 NOTE — TELEPHONE ENCOUNTER
Message noted.  I think the best thing for Codi is to go to [facility].  They have abilities to do a Venous Doppler to look for blood clot.  I think that should be our priority first before continuing with any physical therapy.  Please have him to go to [facility] for further evaluation.

## 2024-06-07 ENCOUNTER — OFFICE VISIT (OUTPATIENT)
Dept: INTERNAL MEDICINE CLINIC | Facility: CLINIC | Age: 79
End: 2024-06-07

## 2024-06-07 VITALS
HEART RATE: 63 BPM | DIASTOLIC BLOOD PRESSURE: 60 MMHG | SYSTOLIC BLOOD PRESSURE: 124 MMHG | TEMPERATURE: 98 F | WEIGHT: 190 LBS | HEIGHT: 63 IN | OXYGEN SATURATION: 99 % | BODY MASS INDEX: 33.66 KG/M2

## 2024-06-07 DIAGNOSIS — R20.0 NUMBNESS AND TINGLING OF LOWER EXTREMITY: ICD-10-CM

## 2024-06-07 DIAGNOSIS — M48.061 SPINAL STENOSIS OF LUMBAR REGION, UNSPECIFIED WHETHER NEUROGENIC CLAUDICATION PRESENT: ICD-10-CM

## 2024-06-07 DIAGNOSIS — R29.898 WEAKNESS OF BOTH LOWER EXTREMITIES: Primary | ICD-10-CM

## 2024-06-07 DIAGNOSIS — R20.2 NUMBNESS AND TINGLING OF LOWER EXTREMITY: ICD-10-CM

## 2024-06-07 DIAGNOSIS — M13.849 ALLERGIC ARTHRITIS OF HAND, UNSPECIFIED LATERALITY: ICD-10-CM

## 2024-06-07 DIAGNOSIS — I10 ESSENTIAL HYPERTENSION: ICD-10-CM

## 2024-06-07 DIAGNOSIS — M65.332 TRIGGER FINGER, LEFT MIDDLE FINGER: ICD-10-CM

## 2024-06-07 DIAGNOSIS — E03.9 ACQUIRED HYPOTHYROIDISM: ICD-10-CM

## 2024-06-07 PROCEDURE — 99214 OFFICE O/P EST MOD 30 MIN: CPT | Performed by: INTERNAL MEDICINE

## 2024-06-07 NOTE — PROGRESS NOTES
Codi Perdomo is a 78 year old female.  HPI:     Chief Complaint   Patient presents with    Follow - Up     5/31 pt went to [facility] for pain for Rt lower leg and was discharged to home after being ruled out for a DVT. 6-3 to  to rule out DVT. Presents with c/o bilateral  Rt leg weakness and l\" legs just give out on me\". Hands have also been cramping.      Codi presents for follow-up.  She was in urgent care [facility].  I did review that note as best as I could.  On that note it was mentioned that she had right lower leg pain over the last 2 weeks.  Pain developed after getting out of of the vehicle over 2 weeks ago.  She had tried over-the-counter ibuprofen.    Workup included a venous Doppler that was negative of her right leg.  X-rays were negative.  Exam showed diffuse varicosities bilateral lower leg and foot.  Mild tenderness along the right anterior lateral proximal shin.    Diagnosis was venous insufficiency.    She comes today for follow-up.  She indicates that 2 weeks ago while getting out of her car her legs suddenly gave out.  They then came back after several minutes.  The same thing happened yesterday when she was going to eat may have her salad.  This also was momentary and her legs regain strength.    She does have tingling in her lower extremities.    She does feel most of the times her legs are okay and her gait is okay.    However I did relay to her and gave her a copy of her MRI scan result and I did copy and pasted below for reference.      Impression  CONCLUSION:  1. Multilevel degenerative changes of the lumbar spine, particularly at L3-L4 and L4-L5, where there is severe spinal canal stenosis. Moderate to severe spinal canal narrowing is seen at L2-L3, and there is moderate spinal canal stenosis at L1-L2.     2. Multilevel foraminal impingement as detailed above.     3. No acute osseous injury of the lumbar spine.     4. 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     5. Distal colonic diverticulosis without MR evidence acute complication in the imaged volume.     6. Lesser incidental findings as above.           elm-remote.        Dictated by (CST): Doyle Sinha MD on 3/01/2024 at 10:25 AM      Finalized by (CST): Doyle Sinha MD on 3/01/2024 at 10:33 AM          We talked about this.  She has had an evaluation of her cystic spleen lesion in the past.  She remains asymptomatic.  No further follow-up for this is needed in my opinion.    However she has had 2 episodes where her \"legs give out\".    I discussed that I did not think this was from venous insufficiency but was concerned about her severe spinal stenosis.    She has no bladder or bowel issues for now.    She will be seeing Dr. Cabezas Monday.  I did give her her MRI report.  I told her we can wait to see what Dr. Cabezas recommends.  She indicated that she was offered pain clinic however she indicated to me that she does not have really any major pain.  She will get occasional low back pain but nothing constant.  No lower back pain today.    She verbalized understanding that she may need further evaluation such as an evaluation for back surgery.    She indicates that she has a stickiness left third finger that to me suggest trigger finger.  She has arthritis of her right DIP joints.    She does have hypertension and hypothyroidism and both these seem to be controlled    I asked her to let me know what Dr. Cabezas thinks about next steps after her visit with him Monday    Current Outpatient Medications   Medication Sig Dispense Refill    clobetasol 0.05 % External Solution       levothyroxine 125 MCG Oral Tab Take 1 tablet every day 90 tablet 3    amLODIPine 2.5 MG Oral Tab Take 1 tablet (2.5 mg total) by mouth daily. 30 tablet 11    metoprolol succinate ER 25 MG Oral Tablet 24 Hr Take 1 tablet (25 mg total) by mouth daily. 90 tablet 3    Multiple Vitamins-Minerals (OCUVITE  EXTRA) Oral Tab Take by mouth.      famoTIDine 20 MG Oral Tab Take 1 tablet (20 mg total) by mouth daily.      Cholecalciferol (VITAMIN D) 1000 UNITS Oral Tab Take 1,000 Units by mouth daily.        Past Medical History:    Acute diverticulitis    Acute meniscal tear of left knee    MRI done by  shows small joint effusion and moderate-sized Baker's cyst and complex medial meniscal tear.  MRI done December 9, 2015     Age-related nuclear cataract of both eyes    Age-related nuclear cataract of both eyes    Baker's cyst    Shah cyst noted on left knee by MRI scan December 9, 2015     Cervicalgia    Condition of the tongue    Dermatitis    Dermatophytosis    Disorder of coccyx    Displacement of lumbar intervertebral disc without myelopathy    Diverticulitis    Diverticulitis    Diverticulitis    CT scan August 6, 2018 showing acute sigmoid diverticulitis with developing phlegmon/abscess left iliac fossa.  Stable complex probably benign cystic splenic lesion measuring 7.3×5.4×7.7 cm unchanged from previous exam.  Hepatic steatosis noted.  Cholecystectomy noted.  Also noted is severe multifactorial spinal stenosis at L4-L5 and chronic wedge compression lower thoracic vertebrae.  Degenerativ    Diverticulitis of colon    Endometriosis    Esophageal reflux    Esophageal reflux    Essential hypertension    Fatty liver    Foreign body in foot, left    Foreign body left heel, Hallux valgus, sharp debridement, removal of a piece glass    Glaucoma suspect of both eyes    Glaucoma suspect of both eyes    Hearing loss    Herpes zoster    History of hysterectomy    HTN (hypertension)    Hypothyroidism    Left groin pain    Meniere's disease    Open wound of knee, leg, and ankle    Osteoarthritis of right hip    Otalgia    Pain in joint, shoulder region    Pain in thoracic spine    Palpitations    Pre-diabetes    Pre-diabetes    Primary localized osteoarthrosis, shoulder region    Log Date: 04/24/2013     Rotator cuff  tear, right    Splenic cyst    Splenic cyst    Spondylolisthesis at L4-L5 level    Tinnitus    Urticaria    Vitreous floaters of right eye    Vitreous floaters of right eye      Social History:  Social History     Socioeconomic History    Marital status: Single   Tobacco Use    Smoking status: Former     Current packs/day: 0.50     Average packs/day: 0.5 packs/day for 2.0 years (1.0 ttl pk-yrs)     Types: Cigarettes     Passive exposure: Past    Smokeless tobacco: Never    Tobacco comments:     long long long time ago   Vaping Use    Vaping status: Never Used   Substance and Sexual Activity    Alcohol use: Not Currently     Comment: Vodka, 2 drinks, yearly    Drug use: No   Other Topics Concern    Caffeine Concern Yes     Comment: chocolate, no per [employer]     Social Determinants of Health     Financial Resource Strain: Patient Declined (4/9/2024)    Received from [facility]    Overall Financial Resource Strain (CARDIA)     Difficulty of Paying Living Expenses: Patient declined   Food Insecurity: Patient Declined (4/9/2024)    Received from [facility]    Hunger Vital Sign     Worried About Running Out of Food in the Last Year: Patient declined     Ran Out of Food in the Last Year: Patient declined   Transportation Needs: Patient Declined (4/9/2024)    Received from [facility]    PRAPARE - Transportation     Lack of Transportation (Medical): Patient declined     Lack of Transportation (Non-Medical): Patient declined   Housing Stability: Patient Declined (4/9/2024)    Received from [facility]    Housing Stability Vital Sign     Unable to Pay for Housing in the Last Year: Patient declined     In the last 12 months, was there a time when you did not have a steady place to sleep or slept in a shelter (including now)?: Patient declined        REVIEW OF SYSTEMS:   GENERAL HEALTH:  feels well otherwise  RESPIRATORY:  Voices no  shortness of breath with exertion or cough  CARDIOVASCULAR:  Voices no chest pain on exertion or shortness of breath  GI:   Voices no abdominal pain or changes of bowels   :Viices no urning or frequency of urination.  NEURO:  see above    EXAM:   /60 (BP Location: Left arm, Patient Position: Sitting, Cuff Size: large)   Pulse 63   Temp 98.4 °F (36.9 °C) (Oral)   Ht 5' 3\" (1.6 m)   Wt 190 lb (86.2 kg)   SpO2 99%   BMI 33.66 kg/m²     GENERAL:  well developed, well nourished, in no apparent distress  LUNGS:  clear to auscultation.  Effort normal  CARDIO:  RRR without murmur.   S1 and S2 normal  GI:  good BS's,  no masses,   HSM or tenderness  EXTREMITIES : no cyanosis, clubbing or edema.  I do not really see any major varicosities lower extremities however she is wearing support hose.  I did let her know that she does not have to wear the support hose if she does not wish to as at least her symptoms of legs giving out would not be from venous insufficiency I would not think.  Calves nontender.  NEURO: She seems to have good strength lower extremities.  I was able to get 1+ knee reflexes bilaterally.  Absent ankle reflexes bilaterally.  Her gait appears to be normal in the room and she is appears to have adequate lower extremity motor strength.    ASSESSMENT AND PLAN:     1. Weakness of both lower extremities  Weakness in both lower extremities.  It appears her \"legs give out\" x 2 as described above.  She will follow-up with orthopedics Monday and we can determine next steps.  She does know about her severe spinal lumbar stenosis.    2. Spinal stenosis of lumbar region, unspecified whether neurogenic claudication present  I think this may be contributing to her leg weakness.  Will await orthopedic evaluation    3. Numbness and tingling of lower extremity  Possibly from her spinal stenosis.    4. Essential hypertension  Blood pressure controlled.    5. Acquired hypothyroidism  Levothyroxine.  TSH at  goal.    6. Trigger finger, left middle finger  I am thinking that she has a trigger finger left third finger.  She can discuss with Dr. Cabezas.    7. Allergic arthritis of hand, unspecified laterality  For now conservative care    This visit was 30 minutes.  I spent 10 minutes before visit preparing and reviewing old records.  Greater than 50% of the visit was engaged in counseling and review of past data.     The patient indicates understanding of these issues and agrees to the plan.    Trino Evans MD  6/7/2024  12:13 PM

## 2024-06-17 NOTE — PATIENT INSTRUCTIONS
Refill policies:    Allow 2-3 business days for refills; controlled substances may take longer.  Contact your pharmacy at least 5 days prior to running out of medication and have them send an electronic request or submit request through the “request refill” option in your [employer] account.  Refills are not addressed on weekends; covering physicians do not authorize routine medications on weekends.  No narcotics or controlled substances are refilled after noon on Fridays or by on call physicians.  By law, narcotics must be electronically prescribed.  A 30 day supply with no refills is the maximum allowed.  If your prescription is due for a refill, you may be due for a follow up appointment.  To best provide you care, patients receiving routine medications need to be seen at least once a year.  Patients receiving narcotic/controlled substance medications need to be seen at least once every 3 months.  In the event that your preferred pharmacy does not have the requested medication in stock (e.g. Backordered), it is your responsibility to find another pharmacy that has the requested medication available.  We will gladly send a new prescription to that pharmacy at your request.    Scheduling Tests:    If your physician has ordered radiology tests such as MRI or CT scans, please contact Central Scheduling at [phone] right away to schedule the test.  Once scheduled, the [facility] Centralized Referral Team will work with your insurance carrier to obtain pre-certification or prior authorization.  Depending on your insurance carrier, approval may take 3-10 days.  It is highly recommended patients assure they have received an authorization before having a test performed.  If test is done without insurance authorization, patient may be responsible for the entire amount billed.      Precertification and Prior Authorizations:  If your physician has recommended that you have a procedure or additional testing performed the [facility]  Centralized Referral Team will contact your insurance carrier to obtain pre-certification or prior authorization.    You are strongly encouraged to contact your insurance carrier to verify that your procedure/test has been approved and is a COVERED benefit.  Although the [facility] Centralized Referral Team does its due diligence, the insurance carrier gives the disclaimer that \"Although the procedure is authorized, this does not guarantee payment.\"    Ultimately the patient is responsible for payment.   Thank you for your understanding in this matter.  Paperwork Completion:  If you require FMLA or disability paperwork for your recovery, please make sure to either drop it off or have it faxed to our office at [phone]. Be sure the form has your name and date of birth on it.  The form will be faxed to our Forms Department and they will complete it for you.  There is a 25$ fee for all forms that need to be filled out.  Please be aware there is a 10-14 day turnaround time.  You will need to sign a release of information (LOUIS) form if your paperwork does not come with one.  You may call the Forms Department with any questions at [phone].  Their fax number is [phone].

## 2024-06-18 ENCOUNTER — TELEPHONE (OUTPATIENT)
Dept: SURGERY | Facility: CLINIC | Age: 79
End: 2024-06-18

## 2024-06-18 ENCOUNTER — OFFICE VISIT (OUTPATIENT)
Dept: SURGERY | Facility: CLINIC | Age: 79
End: 2024-06-18

## 2024-06-18 VITALS
HEIGHT: 63 IN | HEART RATE: 69 BPM | BODY MASS INDEX: 34 KG/M2 | SYSTOLIC BLOOD PRESSURE: 153 MMHG | DIASTOLIC BLOOD PRESSURE: 89 MMHG

## 2024-06-18 DIAGNOSIS — M43.16 SPONDYLOLISTHESIS OF LUMBAR REGION: ICD-10-CM

## 2024-06-18 DIAGNOSIS — M48.062 SPINAL STENOSIS OF LUMBAR REGION WITH NEUROGENIC CLAUDICATION: Primary | ICD-10-CM

## 2024-06-18 DIAGNOSIS — M48.062 LUMBAR STENOSIS WITH NEUROGENIC CLAUDICATION: Primary | ICD-10-CM

## 2024-06-18 PROCEDURE — 99205 OFFICE O/P NEW HI 60 MIN: CPT | Performed by: NEUROLOGICAL SURGERY

## 2024-06-18 NOTE — PROGRESS NOTES
Most recent imaging dated on: 2/2024   Pain Level: 5/10. Patient states that they are having pain located on their lower back and both hands.   Numbness / Tingling: Patient reports numbness and tingling on both hands and feet.  Physical Therapy / Injections: Patient has been compliant with Physical Therapy.

## 2024-06-18 NOTE — PROGRESS NOTES
[facility]      [address]  [address]  [place] [address]    PHONE  [phone]          FAX  [phone]    [email or web address]      OFFICE CONSULTATION          Codi Perdomo  : 1945   MRN: [id number]    PCP: Trino Evans MD  Referring Provider: Bello Cabezas     Insurance: Payor: T MEDICARE ADVANTAGE / Plan: AETNA [place] PPO / Product Type: PPO /            Date of Consult:  2024    Reason for Consultation:  Our patient has been referred to our office for evaluation of:  Lumbar spinal stenosis with neurogenic claudication    History of Present Illness:  Codi Perdomo is a a(n) 78 year old, female presents to my office with a multiyear history of low back pain and bilateral lower extremity pain, numbness and tingling.  Patient spent multiple years caring for her elderly mother who recently passed.  Patient states she began feeling low back and lower extremity tingling multiple years ago but was unable to treat because of alternative needs.  Patient endorses right greater than left leg pain which radiates to the anterior thigh and top of the foot.  Patient Dors is difficulty with walking due to tingling and heaviness and jellylike feeling.  Recently patient started up and felt her legs give out.  She denies bowel or bladder changes or saddle anesthesia.  She has been working with physical therapy which has made a large difference in her strength of the lower extremities.  She has not attempted any epidural steroid injections.  She utilizes over-the-counter medications for her discomfort.      History:  Past Medical History:    Acute diverticulitis    Acute meniscal tear of left knee    MRI done by  shows small joint effusion and moderate-sized Baker's cyst and complex medial meniscal tear.  MRI done 2015     Age-related nuclear cataract of both eyes     Age-related nuclear cataract of both eyes    Baker's cyst    Shah cyst noted on left knee by MRI scan December 9, 2015     Cervicalgia    Condition of the tongue    Dermatitis    Dermatophytosis    Disorder of coccyx    Displacement of lumbar intervertebral disc without myelopathy    Diverticulitis    Diverticulitis    Diverticulitis    CT scan August 6, 2018 showing acute sigmoid diverticulitis with developing phlegmon/abscess left iliac fossa.  Stable complex probably benign cystic splenic lesion measuring 7.3×5.4×7.7 cm unchanged from previous exam.  Hepatic steatosis noted.  Cholecystectomy noted.  Also noted is severe multifactorial spinal stenosis at L4-L5 and chronic wedge compression lower thoracic vertebrae.  Degenerativ    Diverticulitis of colon    Endometriosis    Esophageal reflux    Esophageal reflux    Essential hypertension    Fatty liver    Foreign body in foot, left    Foreign body left heel, Hallux valgus, sharp debridement, removal of a piece glass    Glaucoma suspect of both eyes    Glaucoma suspect of both eyes    Hearing loss    Herpes zoster    History of hysterectomy    HTN (hypertension)    Hypothyroidism    Left groin pain    Meniere's disease    Open wound of knee, leg, and ankle    Osteoarthritis of right hip    Otalgia    Pain in joint, shoulder region    Pain in thoracic spine    Palpitations    Pre-diabetes    Pre-diabetes    Primary localized osteoarthrosis, shoulder region    Log Date: 04/24/2013     Rotator cuff tear, right    Splenic cyst    Splenic cyst    Spondylolisthesis at L4-L5 level    Tinnitus    Urticaria    Vitreous floaters of right eye    Vitreous floaters of right eye      Past Surgical History:   Procedure Laterality Date    Carpal tunnel release      Cholecystectomy      Colonoscopy  08/05/2013    Dr. Rosas - repeat in 2023    Debridement of nail(s), 1-5  2006    debridement of the med border of the left great toenail    Electrocardiogram, complete  03-     Scan to media tab 2013    Electrocardiogram, complete  2013    Scan to media tab 2013    Electrocardiogram, complete  2013    Scan to media tab 2013    Hysterectomy      Knee scope,med/lat menisectomy Left 2016    Procedure: ARTHROSCOPY KNEE LEFT;  Surgeon: Wilmer Garcia MD;  [facility]      Other      thumb surgery    Other surgical history  Carpal Tunnel both hands    Left knee torn meniscus    Removal of foreign body      Sharp debridement, removal of a piece of glass from Hallux valgus, left heel    Tonsillectomy      Total abdom hysterectomy       Family History   Problem Relation Age of Onset    Hypertension Mother     Thyroid Disorder Mother         thyroid problems    Kidney Disease Mother         Chronic kidney disease    Cancer Mother         Cancer-breast    Breast Cancer Mother 84    Dementia Mother     Heart Disease Father         CAD, father passed away in [place], he had what sounds like sudden death    Macular degeneration Father     Cancer Brother         Cancer-prostate    Diabetes Neg     Glaucoma Neg       Social History     Socioeconomic History    Marital status: Single     Spouse name: Not on file    Number of children: Not on file    Years of education: Not on file    Highest education level: Not on file   Occupational History    Not on file   Tobacco Use    Smoking status: Former     Current packs/day: 0.50     Average packs/day: 0.5 packs/day for 2.0 years (1.0 ttl pk-yrs)     Types: Cigarettes     Passive exposure: Past    Smokeless tobacco: Never    Tobacco comments:     long long long time ago   Vaping Use    Vaping status: Never Used   Substance and Sexual Activity    Alcohol use: Not Currently     Comment: Vodka, 2 drinks, yearly    Drug use: No    Sexual activity: Not on file   Other Topics Concern     Service Not Asked    Blood Transfusions Not Asked    Caffeine Concern Yes     Comment: chocolate, no per ECD  [employer]    Occupational Exposure Not Asked    Hobby Hazards Not Asked    Sleep Concern Not Asked    Stress Concern Not Asked    Weight Concern Not Asked    Special Diet Not Asked    Back Care Not Asked    Exercise Not Asked    Bike Helmet Not Asked    Seat Belt Not Asked    Self-Exams Not Asked   Social History Narrative    Not on file     Social Determinants of Health     Financial Resource Strain: Patient Declined (4/9/2024)    Received from [facility]    Overall Financial Resource Strain (CARDIA)     Difficulty of Paying Living Expenses: Patient declined   Food Insecurity: Patient Declined (4/9/2024)    Received from [facility]    Hunger Vital Sign     Worried About Running Out of Food in the Last Year: Patient declined     Ran Out of Food in the Last Year: Patient declined   Transportation Needs: Patient Declined (4/9/2024)    Received from [facility]    PRAPARE - Transportation     Lack of Transportation (Medical): Patient declined     Lack of Transportation (Non-Medical): Patient declined   Physical Activity: Not on file   Stress: Not on file   Social Connections: Not on file   Housing Stability: Patient Declined (4/9/2024)    Received from [facility]    Housing Stability Vital Sign     Unable to Pay for Housing in the Last Year: Patient declined     Number of Places Lived in the Last Year: Not on file     In the last 12 months, was there a time when you did not have a steady place to sleep or slept in a shelter (including now)?: Patient declined        Allergies:  Allergies   Allergen Reactions    Nitrofurantoin PALPITATIONS     Dizziness,SOB ,blurry vision    Adhesive Tape SWELLING    Celecoxib      Other reaction(s): CELECOXIB    Clindamycin      Other reaction(s): Altered Heart Rate  Other reaction(s): CLINDAMYCIN    Keflex [Cephalexin] SWELLING    Latex      Other reaction(s): LATEX  Other reaction(s): LATEX     Levofloxacin      Other reaction(s): LEVOFLOXACIN  Other reaction(s): LEVOFLOXACIN    Methylprednisolone      Other reaction(s): Altered Heart Rate  Other reaction(s): METHYLPREDNISOLONE  Other reaction(s): METHYLPREDNISOLONE SOD SUCC    Metronidazole      Other reaction(s): METRONIDAZOLE HCL    Omeprazole      Other reaction(s): headaches  Other reaction(s): OMEPRAZOLE  Other reaction(s): Dizzy  Other reaction(s): headaches  Other reaction(s): OMEPRAZOLE MAGNESIUM  Other reaction(s): Dizzy    Sulfa Antibiotics      Other reaction(s): Itching    Sulfanilamide      Other reaction(s): Itching    Tobramycin Sulfate  [Na Metabisulfite-Tobramycin Sulfate]      Other reaction(s): TOBRAMYCIN SULFATE    Tobramycin Sulfate [Na Metabisulfite-Phenol-Tobramycin] OTHER (SEE COMMENTS)     Other reaction(s): eye swelling    Celecoxib RASH    Tobramycin RASH       Medications:  Current Outpatient Medications   Medication Sig Dispense Refill    clobetasol 0.05 % External Solution       levothyroxine 125 MCG Oral Tab Take 1 tablet every day 90 tablet 3    amLODIPine 2.5 MG Oral Tab Take 1 tablet (2.5 mg total) by mouth daily. 30 tablet 11    metoprolol succinate ER 25 MG Oral Tablet 24 Hr Take 1 tablet (25 mg total) by mouth daily. 90 tablet 3    Multiple Vitamins-Minerals (OCUVITE EXTRA) Oral Tab Take by mouth.      famoTIDine 20 MG Oral Tab Take 1 tablet (20 mg total) by mouth daily.      Cholecalciferol (VITAMIN D) 1000 UNITS Oral Tab Take 1,000 Units by mouth daily.          Review of Systems:  A 10-point system was reviewed.  Pertinent positives and negatives are noted in HPI.      Physical Exam:  /89   Pulse 69   Ht 63\"   BMI 33.66 kg/m²        Neurological Exam:    Motor Strength:  Right hip flexion plus 4 out of 5  Right dorsiflexion plus 4 out of 5  Otherwise 5 out of 5 bilateral lower extremities    Sensation to light touch:  Patchy sensation loss in bilateral lower extremities, particularly in the anterior  shin    DTRs:  Hypoactive in bilateral lower extremity patellar and Achilles    Long tract signs:  Negative garza  Negative babinski  Negative clonus      Abdomen:  Soft, non-distended, non-tender, with no rebound or guarding.  No peritoneal signs.     Extremities:  Non-tender, no lower extremity edema noted.      Labs:  CBC:  Lab Results   Component Value Date    WBC 10.7 01/04/2024    HGB 14.0 01/04/2024    HCT 43.4 01/04/2024    MCV 87.3 01/04/2024    .0 01/04/2024      BMG:   Lab Results   Component Value Date     05/14/2024    K 4.9 05/14/2024    CO2 30.0 05/14/2024     05/14/2024    BUN 14 05/14/2024      INR:   No results found for: \"INR\", \"PROTIME\"     Diagnostics:  MRI lumbar spine dated 2/29/2024 reviewed:  At L3-4 there is a large disc bulge with superimposed broad-based bilateral foraminal protrusions, hypertrophic facets, buckling of the ligamentum flavum and dorsal epidural fat prominence contributing to severe spinal canal stenosis.  At L4-5 there is similar findings as well as a grade 1 anterolisthesis of L4 on L5 contributing to severe central canal stenosis and severe right worse than left foraminal stenosis.     Diagnosis:  1. Lumbar stenosis with neurogenic claudication    2. Spondylolisthesis of lumbar region        Assessment/Plan:  Codi Perdomo is a a(n) 78 year old, female, who presents to my office for evaluation of low back and bilateral lower extremity radiating pain.  Patient has MRI evidence of severe central canal stenosis at L3-4 and L4-5 with a grade 1 spondylolisthesis of L4 on L5.  We discussed these findings and what they mean.  Patient's symptoms are consistent with neurogenic claudication and on exam she has L3 and L5 weakness on the right.  I have recommended an L3-4 and L4-5 laminectomy with an L4-5 interbody fusion to stabilize the spondylolisthesis and prevent progression of her instability.  We discussed the procedure in detail as well as and the  risks and benefits associated including infection, blood loss, dural tear, need for more surgery as well as risks associated with anesthesia including PE, MI, CVA etc..  Patient has an upcoming  vacation which she would prefer not to delay.  We will be scheduling the surgery for 7 August at [place] pending preoperative clearance.    All questions and concerns were addressed. We appreciate the opportunity to participate in the care of this patient. Please do not hesitate to call our office ([phone]) with any issues.     This report will be submitted to the referring provider.    This note has been dictated utilizing voice recognition software. Unfortunately, this may lead to occasional typographical errors. If there are any questions regarding this, please do not hesitate to contact our office.         Victor Manuel Nielsen MD    6/18/2024  11:03 AM

## 2024-06-19 NOTE — TELEPHONE ENCOUNTER
You are scheduled for Right L3-4, L4-5 laminectomy and L4-5 interbody fusion  on 8-21-24 with Dr. Nielsen at [facility].      PCP clearance is needed within 30 days of surgery.  We have faxed a request for pre-op clearance to your primary care physician Dr Evans. Please contact their office for appointment.  Please schedule this appointment AT LEAST 1 WEEK PRIOR TO SURGERY DATE.  Your PCP may order additional testing or clearance from another specialist.   You will have an appointment with our RN Spine Navigator prior to surgery.  This is an educational telehealth/phone visit in which you will receive more information on the specifics of your surgery, instructions for before surgery, what to expect in the hospital and how to take care of yourself after surgery.  Your surgeon wants you to to participate in this visit so that you are as prepared for surgery as possible and have an opportunity to ask questions.  If possible, we would like your care partner to be present for the visit as well.    Cardiac clearance is also being requested.  A fax requesting clearance has been faxed to Dr Park's office. Please contact their office for an appointment.    You will need to contact the Pre-admission department at [phone]. The Pre-Admission nurse will review your health history and give you information for day-of instructions. The nurse will also get you scheduled for all your pre-op testing required for your surgery.   You will need pre-operative labs which will include blood work and a MRSA/MSSA test (nasal swab). You will need for fast for the blood work. You may also need an EKG and/or chest x-ray depending on your current health status.    Do not take any blood thinning medications such as over the counter NSAIDS (advil, aleve, ibuprofen etc.), herbal supplements (garlic,tumeric etc.), vitamin E, fish oil or krill oil for at least 7 days prior to surgery.  If you have questions about your other medications,  please call our office or send a [employer] message.   You should have nothing to eat or drink after 11:00pm the night prior to surgery except for the following:  Do drink 12 ounces of regular Gatorade (NOT RED) 12 hours and 4 hours prior to your scheduled surgery time. Do not drink any other liquids (including water) before your surgery. Take 1000 mg of Tylenol (Acetaminophen) 4 hours before your scheduled surgery time, take this with your scheduled Gatorade.  In order to prevent infection, you will need to purchase Hibiclens soap and use it after your regular body soap for 5 days prior to your procedure.  The last shower should be the night before surgery.  This soap can be found at any pharmacy in the first aid section. See detailed instructions below.    Our office will get prior authorization for surgery through your insurance.   Surgery is usually scheduled as 2-3 day admission.  This is an estimate and varies from person to person.  Ultimately, the surgeon will determine when you are ready to be discharged.  The hospital will contact you 1-2 days before surgery with your arrival time.       If you require FMLA or disability paperwork for your recovery, please make sure to either drop it off or have it faxed to our office at [phone]. Be sure the form has your name and date of birth on it.  The form will be faxed to our Forms Department and they will complete it for you.  There is a 25$ fee for all forms that need to be filled out.  Please be aware there is a 10-14 day turnaround time.  You will need to sign a release of information (LOUIS) form if your paperwork does not come with one.  You may call the Forms Department with any questions at [phone].  Their fax number is [phone].     POST OP CARE--First Two Weeks  No bending at waist, twisting, pushing or pulling  No lifting more than 10 lb (a gallon of milk)  Wear cervical collar/lumbar brace, if prescribed, as instructed by surgeon  No overhead  reaching  No driving until approved by your surgeon   Change positions frequently. No prolonged sitting or standing.  Increase walking as tolerated to avoid blood clots  No NSAIDs until approved by surgeon (ibuprofen, aleve, advil, meloxicam, Celebrex, diclofenac etc).   Remove any bandage on post op day 3.  Leave incision open to air.  Glue will fall off on its own.  If you have staples or sutures that are not dissolvable, these will be removed at your 2 or 3 week post op visit.   Check your incision for signs of infection daily (redness, warmth, drainage, increased pain at incision site, fever)  Do not shower until post op day 3.  Do not allow water pressure to directly hit the incision.  Do not scrub the incision and avoid any lotion, creams or perfume near the incision site.  No swimming, hot tubs or baths until your incision is completely healed.  Take pain medication as prescribed, if needed.  Constipation is a common side effect of opioids.  If you experience constipation, drink plenty of water and take over-the-counter stool softeners daily.   Avoid nicotine and alcohol   When to call the office:  Increased or change in location of pain  Increased weakness in arms or legs  Incision drainage, redness or warmth  Fever  Bowel or bladder changes   Choking on food or liquids (cervical)  Pain, redness, swelling, color changes or warmth in lower leg    Hibiclens Bathing  Hibiclens is a body soap that is used before surgery to protect you from getting an infection post-operatively  Hibiclens comes in a large blue bottle and can be found in most pharmacies in the First Aid supplies  Shower with this daily for FIVE consecutive days before surgery, using the entire bottle over the five days.  The last shower should be the night before surgery.   Steps to bathing with Hibiclens  Do not use Hibiclens on your hair, face or private areas  Wash your hair and body as normal with your usual cleansers  Rinse well  Using a  clean wet washcloth apply enough Hibiclens to cover your body. Wash from the neck down avoiding the genital areas and concentrating on the surgical area  Rinse well  Dry yourself with a clean, dry towel  Do not use any powders, creams, lotions or sprays on your body as these attract bacteria  Deodorant, hand lotion and facial creams are acceptable.

## 2024-06-19 NOTE — TELEPHONE ENCOUNTER
Patient is scheduled for Right L3-4, L4-5 laminectomy and L4-5 interbody fusion  on 8-21-24 with Dr Nielsen at [facility].

## 2024-06-20 NOTE — TELEPHONE ENCOUNTER
Patient is scheduled for Right L3-4, L4-5 laminectomy and L4-5 interbody fusion  on 8-21-24 with Dr Nielsen at [facility].    N/A pre-op apt scheduled (if sx is more than 30 days from last apt)  Ypre-op apt scheduled with RN spine navigator  Y Surgical instructions reviewed by nursing staff with patient  Y  form completed  Y Surgery order signed   Y Placed sx on surgery sheet  Y Placed on outlook calendar  Y [employer] message sent to patient with sx instructions  Y Faxed pre-op clearance request to PCP Dr Evans   Y Faxed letter Cardiologist Dr Roe   N/a E-mail sent to [facility]  Y Post-op appointments made  Y FAUZIA [place] medicare. Routed to PA team to initiate.  Y Post-Op outreach pt reminder placed.   Y Entire Neurosurgery Checklist Completed    Clearances: PCP, Cards  PA: [employer] medicare  CPT Codes: 71281, 65516, 21746, 34960, 02560, 22436, 96523, [id number]

## 2024-07-01 NOTE — TELEPHONE ENCOUNTER
Prior Authorization for Inpatient surgery initiated with [employer] at [facility] at [phone].  Requesting coverage for:Right L3-4, L4-5 laminectomy and L4-5 interbody fusion   Date of Service: 08/21/24   Inpatient days requested:Inpatient   CPT codes: 91402, 78454, 49023, 84022, 24941, 96584, 84382, 43436    Request for surgery pending review, pending reference #[id number]. Clinical notes/spine form faxed to [phone], confirmation received.

## 2024-07-08 NOTE — TELEPHONE ENCOUNTER
Received call from [facility] with Lita [phone].  Surgery is approved except for CPT 60223 which is considered experimental and investigational.          Prior authorization request completed for: Right L3-4, L4-5 laminectomy and L4-5 interbody fusion    Authorization #[id number]  Authorization dates: 08/21/24  CPT codes approved: 15648, 85079, 93015, 01445, 24979, 61049, 94497  Number of visits/dates of service approved: Inpatient (approved up to 5 Days)  Physician: Gia   Location: [facility]     Call Ref#: [id number]  Representative Name: [facility]   Insurance Carrier: [employer]     Per nurse will remove CPT 86514 from the case since not approved.

## 2024-07-09 ENCOUNTER — OFFICE VISIT (OUTPATIENT)
Dept: INTERNAL MEDICINE CLINIC | Facility: CLINIC | Age: 79
End: 2024-07-09

## 2024-07-09 VITALS
DIASTOLIC BLOOD PRESSURE: 75 MMHG | OXYGEN SATURATION: 94 % | SYSTOLIC BLOOD PRESSURE: 132 MMHG | WEIGHT: 190 LBS | HEIGHT: 63 IN | HEART RATE: 68 BPM | BODY MASS INDEX: 33.66 KG/M2 | TEMPERATURE: 98 F

## 2024-07-09 DIAGNOSIS — M43.16 SPONDYLOLISTHESIS OF LUMBAR REGION: Primary | ICD-10-CM

## 2024-07-09 DIAGNOSIS — I10 ESSENTIAL HYPERTENSION: ICD-10-CM

## 2024-07-09 DIAGNOSIS — R29.898 WEAKNESS OF BOTH LOWER EXTREMITIES: ICD-10-CM

## 2024-07-09 DIAGNOSIS — Z23 NEED FOR VACCINATION AGAINST STREPTOCOCCUS PNEUMONIAE: ICD-10-CM

## 2024-07-09 DIAGNOSIS — M48.061 SPINAL STENOSIS OF LUMBAR REGION, UNSPECIFIED WHETHER NEUROGENIC CLAUDICATION PRESENT: ICD-10-CM

## 2024-07-09 DIAGNOSIS — E03.9 ACQUIRED HYPOTHYROIDISM: ICD-10-CM

## 2024-07-09 PROCEDURE — 99214 OFFICE O/P EST MOD 30 MIN: CPT | Performed by: INTERNAL MEDICINE

## 2024-07-09 PROCEDURE — G0009 ADMIN PNEUMOCOCCAL VACCINE: HCPCS | Performed by: INTERNAL MEDICINE

## 2024-07-09 PROCEDURE — 90677 PCV20 VACCINE IM: CPT | Performed by: INTERNAL MEDICINE

## 2024-07-09 RX ORDER — MECLIZINE HCL 12.5 MG/1
12.5 TABLET ORAL 3 TIMES DAILY PRN
Qty: 40 TABLET | Refills: 0 | Status: SHIPPED | OUTPATIENT
Start: 2024-07-09

## 2024-07-09 RX ORDER — AZITHROMYCIN 250 MG/1
TABLET, FILM COATED ORAL
Qty: 6 TABLET | Refills: 0 | Status: SHIPPED | OUTPATIENT
Start: 2024-07-09 | End: 2024-07-13

## 2024-07-09 RX ORDER — AMOXICILLIN 875 MG/1
875 TABLET, COATED ORAL 2 TIMES DAILY
Qty: 20 TABLET | Refills: 0 | Status: SHIPPED | OUTPATIENT
Start: 2024-07-09

## 2024-07-09 NOTE — PROGRESS NOTES
Codi Perdomo is a 78 year old female.  HPI:     Chief Complaint   Patient presents with    Checkup     Discuss upcoming back surgery with Dr Nielsen on 8/21 (has Pre-op scheduled for 8/13), medications for travel to [place] (concerned about Vertigo), interested in PCV 20       Codi presents for the following reasons    She will be going on a trip to [place].  She wishes a Zithromax Z-SMITH prescription in case she gets any head congestion or coughing or \"cold\" symptoms.  Also she just wants amoxicillin for diverticulitis.  She does not want Augmentin as this created diarrhea.  Amoxicillin in the past has worked for her diverticulitis.  Will comply with her wishes.  Zithromax Z-SMITH and amoxicillin sent to her pharmacy    In addition she wants a refill of meclizine in case she gets vertigo.  She has a history of Ménière's disease.  We sent that over to her pharmacy    She did see Dr. Nielsen.  Surgery for her back is scheduled for August 21.    She indicates however she is not sure she wishes to proceed.  She indicates that after she was caring for her mom he took a lot of effort with her back since mom was bedridden that she had 3 episodes in 1 week of her legs giving out.  She would shake her legs and the feelings would come back.  She does have some tingling in her feet bilaterally but she feels that her strength now is back to normal.  She [place] in the forest [place] 2 and half hours.    Made the decision to get a second opinion with .  She has an appointment coming up and then the following day she will see Dr. Nielsen again to discuss pros and cons of proceeding with her back surgery.    She does not have any cardiac complaints.  No shortness of breath GI or  complaints    Wishes to have PCV 20 vaccine today.    Let me know her decision next week as to whether she is going to proceed with her surgery so that I can order preop testing.  Current Outpatient Medications   Medication Sig Dispense Refill     azithromycin (ZITHROMAX Z-SMITH) 250 MG Oral Tab Take 2 tablets (500 mg total) by mouth daily for 1 day, THEN 1 tablet (250 mg total) daily for 4 days. 6 tablet 0    amoxicillin 875 MG Oral Tab Take 1 tablet (875 mg total) by mouth 2 (two) times daily. 20 tablet 0    meclizine 12.5 MG Oral Tab Take 1 tablet (12.5 mg total) by mouth 3 (three) times daily as needed. 40 tablet 0    clobetasol 0.05 % External Solution       levothyroxine 125 MCG Oral Tab Take 1 tablet every day 90 tablet 3    amLODIPine 2.5 MG Oral Tab Take 1 tablet (2.5 mg total) by mouth daily. 30 tablet 11    metoprolol succinate ER 25 MG Oral Tablet 24 Hr Take 1 tablet (25 mg total) by mouth daily. 90 tablet 3    Multiple Vitamins-Minerals (OCUVITE EXTRA) Oral Tab Take by mouth.      famoTIDine 20 MG Oral Tab Take 1 tablet (20 mg total) by mouth daily.      Cholecalciferol (VITAMIN D) 1000 UNITS Oral Tab Take 1,000 Units by mouth daily.        Past Medical History:    Acute diverticulitis    Acute meniscal tear of left knee    MRI done by  shows small joint effusion and moderate-sized Baker's cyst and complex medial meniscal tear.  MRI done December 9, 2015     Age-related nuclear cataract of both eyes    Age-related nuclear cataract of both eyes    Baker's cyst    Shah cyst noted on left knee by MRI scan December 9, 2015     Cervicalgia    Condition of the tongue    Dermatitis    Dermatophytosis    Disorder of coccyx    Displacement of lumbar intervertebral disc without myelopathy    Diverticulitis    Diverticulitis    Diverticulitis    CT scan August 6, 2018 showing acute sigmoid diverticulitis with developing phlegmon/abscess left iliac fossa.  Stable complex probably benign cystic splenic lesion measuring 7.3×5.4×7.7 cm unchanged from previous exam.  Hepatic steatosis noted.  Cholecystectomy noted.  Also noted is severe multifactorial spinal stenosis at L4-L5 and chronic wedge compression lower thoracic vertebrae.  Degenerativ     Diverticulitis of colon    Endometriosis    Esophageal reflux    Esophageal reflux    Essential hypertension    Fatty liver    Foreign body in foot, left    Foreign body left heel, Hallux valgus, sharp debridement, removal of a piece glass    Glaucoma suspect of both eyes    Glaucoma suspect of both eyes    Hearing loss    Herpes zoster    History of hysterectomy    HTN (hypertension)    Hypothyroidism    Left groin pain    Meniere's disease    Open wound of knee, leg, and ankle    Osteoarthritis of right hip    Otalgia    Pain in joint, shoulder region    Pain in thoracic spine    Palpitations    Pre-diabetes    Pre-diabetes    Primary localized osteoarthrosis, shoulder region    Log Date: 04/24/2013     Rotator cuff tear, right    Splenic cyst    Splenic cyst    Spondylolisthesis at L4-L5 level    Tinnitus    Urticaria    Vitreous floaters of right eye    Vitreous floaters of right eye      Social History:  Social History     Socioeconomic History    Marital status: Single   Tobacco Use    Smoking status: Former     Current packs/day: 0.50     Average packs/day: 0.5 packs/day for 2.0 years (1.0 ttl pk-yrs)     Types: Cigarettes     Passive exposure: Past    Smokeless tobacco: Never    Tobacco comments:     long long long time ago   Vaping Use    Vaping status: Never Used   Substance and Sexual Activity    Alcohol use: Not Currently     Comment: Vodka, 2 drinks, yearly    Drug use: No   Other Topics Concern    Caffeine Concern Yes     Comment: chocolate, no per [employer]     Social Determinants of Health     Financial Resource Strain: Low Risk  (6/20/2024)    Received from [facility]    Overall Financial Resource Strain (CARDIA)     Difficulty of Paying Living Expenses: Not hard at all   Food Insecurity: No Food Insecurity (6/20/2024)    Received from [facility]    Hunger Vital Sign     Worried About Running Out of Food in the Last Year: Never true     Ran Out of Food  in the Last Year: Never true   Transportation Needs: No Transportation Needs (6/20/2024)    Received from [facility]    PRAPARE - Transportation     Lack of Transportation (Medical): No     Lack of Transportation (Non-Medical): No   Housing Stability: Low Risk  (6/20/2024)    Received from [facility]    Housing Stability Vital Sign     Unable to Pay for Housing in the Last Year: No     Number of Places Lived in the Last Year: 1     In the last 12 months, was there a time when you did not have a steady place to sleep or slept in a shelter (including now)?: No        REVIEW OF SYSTEMS:   GENERAL HEALTH:  feels well otherwise  RESPIRATORY:  Voices no shortness of breath with exertion or cough  CARDIOVASCULAR:  Voices no chest pain on exertion or shortness of breath  GI:   Voices no abdominal pain or changes of bowels   :Viices no urning or frequency of urination.  NEURO:  Voices no  headaches or dizziness    EXAM:   /75   Pulse 68   Temp 97.6 °F (36.4 °C) (Oral)   Ht 5' 3\" (1.6 m)   Wt 190 lb (86.2 kg)   SpO2 94%   BMI 33.66 kg/m²     GENERAL:  well developed, well nourished, in no apparent distress  SKIN:  no rashes ,  HEENT: atraumatic.    Pharynx normal without exudate.  EYES:  PERRL. Sclera anicteric  NECK:  Supple,  no adenopathy,    LUNGS:  clear to auscultation.  Effort normal  CARDIO:  RRR without murmur.   S1 and S2 normal  GI:  good BS's,  no masses,   HSM or tenderness  EXTREMITIES : no cyanosis, clubbing or edema    ASSESSMENT AND PLAN:     1. Spondylolisthesis of lumbar region  Spondylolisthesis of lumbar region.  I did review Dr. Nielsen's consult note from June 18, 2024.  Copy and pasted his consult note below for reference.  Diagnostics:  MRI lumbar spine dated 2/29/2024 reviewed:  At L3-4 there is a large disc bulge with superimposed broad-based bilateral foraminal protrusions, hypertrophic facets, buckling of the ligamentum flavum and dorsal  epidural fat prominence contributing to severe spinal canal stenosis.  At L4-5 there is similar findings as well as a grade 1 anterolisthesis of L4 on L5 contributing to severe central canal stenosis and severe right worse than left foraminal stenosis.     Diagnosis:  1. Lumbar stenosis with neurogenic claudication     2. Spondylolisthesis of lumbar region           Assessment/Plan:  Codi Perdomo is a a(n) 78 year old, female, who presents to my office for evaluation of low back and bilateral lower extremity radiating pain.  Patient has MRI evidence of severe central canal stenosis at L3-4 and L4-5 with a grade 1 spondylolisthesis of L4 on L5.  We discussed these findings and what they mean.  Patient's symptoms are consistent with neurogenic claudication and on exam she has L3 and L5 weakness on the right.  I have recommended an L3-4 and L4-5 laminectomy with an L4-5 interbody fusion to stabilize the spondylolisthesis and prevent progression of her instability.  We discussed the procedure in detail as well as and the risks and benefits associated including infection, blood loss, dural tear, need for more surgery as well as risks associated with anesthesia including PE, MI, CVA etc..  Patient has an upcoming  vacation which she would prefer not to delay.  We will be scheduling the surgery for 7 August at [place] pending preoperative clearance.     All questions and concerns were addressed. We appreciate the opportunity to participate in the care of this patient. Please do not hesitate to call our office ([phone]) with any issues.      This report will be submitted to the referring provider.     This note has been dictated utilizing voice recognition software. Unfortunately, this may lead to occasional typographical errors. If there are any questions regarding this, please do not hesitate to contact our office.            Victor Manuel Nielsen MD     6/18/2024  11:03 AM      2. Spinal stenosis of lumbar  region, unspecified whether neurogenic claudication present  See report of MRI as above    3. Weakness of both lower extremities  Codi thinks her lower extremity weakness has resolved.  She indicates that she had 3 episodes in 1 week but now feels asymptomatic.  She has no back pain.  No leg weakness.  She does have some tingling in her feet.    4. Essential hypertension  Blood pressure under good control    5. Acquired hypothyroidism  On levothyroxine.  Asymptomatic.  TSH at goal    6. Need for vaccination against Streptococcus pneumoniae  PCV 20 given today  - Prevnar 20 (PCV20) [54276]    This visit was 30 minutes.  I spent 10 minutes before visit preparing and reviewing old records.  Greater than 50% of the visit was engaged in counseling and review of past data.    She will let me know after consultations with her back surgeons whether she will proceed with back surgery.    The patient indicates understanding of these issues and agrees to the plan.    Trino Evans MD  7/9/2024  9:19 AM

## 2024-07-16 ENCOUNTER — TELEPHONE (OUTPATIENT)
Dept: INTERNAL MEDICINE CLINIC | Facility: CLINIC | Age: 79
End: 2024-07-16

## 2024-07-16 ENCOUNTER — OFFICE VISIT (OUTPATIENT)
Dept: SURGERY | Facility: CLINIC | Age: 79
End: 2024-07-16
Payer: MEDICARE

## 2024-07-16 VITALS
DIASTOLIC BLOOD PRESSURE: 73 MMHG | BODY MASS INDEX: 33.66 KG/M2 | HEIGHT: 63 IN | HEART RATE: 70 BPM | SYSTOLIC BLOOD PRESSURE: 141 MMHG | WEIGHT: 190 LBS

## 2024-07-16 DIAGNOSIS — M48.062 SPINAL STENOSIS OF LUMBAR REGION WITH NEUROGENIC CLAUDICATION: Primary | ICD-10-CM

## 2024-07-16 DIAGNOSIS — M48.062 LUMBAR STENOSIS WITH NEUROGENIC CLAUDICATION: ICD-10-CM

## 2024-07-16 DIAGNOSIS — M43.16 SPONDYLOLISTHESIS OF LUMBAR REGION: ICD-10-CM

## 2024-07-16 PROCEDURE — 99213 OFFICE O/P EST LOW 20 MIN: CPT

## 2024-07-16 NOTE — TELEPHONE ENCOUNTER
To Dr Cristina CAMARGO      Patient called to give Dr. Evans update on Back Surgery.      Patient stated she canceled Back surgery. She went for second opinion yesterday with Dr. Hawkins at [facility] in [place].      Patient stated that Dr. Hawkins reviewed MRI, did testing and stated that since patient is not in pain currently he felt that surgery was not required at this time but may be needed later in life.

## 2024-07-16 NOTE — TELEPHONE ENCOUNTER
Patient called to give Dr. Evans update on Back Surgery.     Patient stated she canceled Back surgery. She went for second opinion yesterday with Dr. Hawkins at [facility] in [place].     Patient stated that Dr. Hawkins reviewed MRI, did testing and stated that since patient is not in pain currently he felt that surgery was not required at this time but may be needed later in life.

## 2024-07-16 NOTE — PATIENT INSTRUCTIONS
Refill policies:    Allow 2-3 business days for refills; controlled substances may take longer.  Contact your pharmacy at least 5 days prior to running out of medication and have them send an electronic request or submit request through the “request refill” option in your [employer] account.  Refills are not addressed on weekends; covering physicians do not authorize routine medications on weekends.  No narcotics or controlled substances are refilled after noon on Fridays or by on call physicians.  By law, narcotics must be electronically prescribed.  A 30 day supply with no refills is the maximum allowed.  If your prescription is due for a refill, you may be due for a follow up appointment.  To best provide you care, patients receiving routine medications need to be seen at least once a year.  Patients receiving narcotic/controlled substance medications need to be seen at least once every 3 months.  In the event that your preferred pharmacy does not have the requested medication in stock (e.g. Backordered), it is your responsibility to find another pharmacy that has the requested medication available.  We will gladly send a new prescription to that pharmacy at your request.    Scheduling Tests:    If your physician has ordered radiology tests such as MRI or CT scans, please contact Central Scheduling at [phone] right away to schedule the test.  Once scheduled, the [facility] Centralized Referral Team will work with your insurance carrier to obtain pre-certification or prior authorization.  Depending on your insurance carrier, approval may take 3-10 days.  It is highly recommended patients assure they have received an authorization before having a test performed.  If test is done without insurance authorization, patient may be responsible for the entire amount billed.      Precertification and Prior Authorizations:  If your physician has recommended that you have a procedure or additional testing performed the [facility]  Centralized Referral Team will contact your insurance carrier to obtain pre-certification or prior authorization.    You are strongly encouraged to contact your insurance carrier to verify that your procedure/test has been approved and is a COVERED benefit.  Although the [facility] Centralized Referral Team does its due diligence, the insurance carrier gives the disclaimer that \"Although the procedure is authorized, this does not guarantee payment.\"    Ultimately the patient is responsible for payment.   Thank you for your understanding in this matter.  Paperwork Completion:  If you require FMLA or disability paperwork for your recovery, please make sure to either drop it off or have it faxed to our office at [phone]. Be sure the form has your name and date of birth on it.  The form will be faxed to our Forms Department and they will complete it for you.  There is a 25$ fee for all forms that need to be filled out.  Please be aware there is a 10-14 day turnaround time.  You will need to sign a release of information (LOUIS) form if your paperwork does not come with one.  You may call the Forms Department with any questions at [phone].  Their fax number is [phone].

## 2024-07-16 NOTE — PROGRESS NOTES
[facility]        [facility]      [address]  [address]  [place] [address]    PHONE  [phone]          FAX  [phone]    [email or web address]      OFFICE FOLLOW-UP NOTE            Codi Perdomo    : 1945    MRN: [id number]  CSN: [id number]      PCP: Trino Evans MD  Referring Provider: No ref. provider found    Insurance: Payor: [employer] MED[employer]ARE ADVANTAGE / Plan: [employer] [place] PPO / Product Type: PPO /           Date of Visit: 2024    Reason for Visit:   Chief Complaint    Pre-Op                         History of Present Care:  Codi Perdomo is a a(n) 78 year old, female returns to the office for further surgical discussion.  Patient endorses resolution of her low back and lower extremity pain.  She is working with physical therapy and making steady progress. She denies bowel or bladder changes. She would like to cancel her upcoming surgery and will return to the office when symptoms worsen.    History:  .  Past Medical History:    Acute diverticulitis    Acute meniscal tear of left knee    MRI done by  shows small joint effusion and moderate-sized Baker's cyst and complex medial meniscal tear.  MRI done 2015     Age-related nuclear cataract of both eyes    Age-related nuclear cataract of both eyes    Baker's cyst    Shah cyst noted on left knee by MRI scan 2015     Cervicalgia    Condition of the tongue    Dermatitis    Dermatophytosis    Disorder of coccyx    Displacement of lumbar intervertebral disc without myelopathy    Diverticulitis    Diverticulitis    Diverticulitis    CT scan 2018 showing acute sigmoid diverticulitis with developing phlegmon/abscess left iliac fossa.  Stable complex probably benign cystic splenic lesion measuring 7.3×5.4×7.7 cm unchanged from previous exam.  Hepatic steatosis noted.  Cholecystectomy noted.  Also noted is severe  multifactorial spinal stenosis at L4-L5 and chronic wedge compression lower thoracic vertebrae.  Degenerativ    Diverticulitis of colon    Endometriosis    Esophageal reflux    Esophageal reflux    Essential hypertension    Fatty liver    Foreign body in foot, left    Foreign body left heel, Hallux valgus, sharp debridement, removal of a piece glass    Glaucoma suspect of both eyes    Glaucoma suspect of both eyes    Hearing loss    Herpes zoster    History of hysterectomy    HTN (hypertension)    Hypothyroidism    Left groin pain    Meniere's disease    Open wound of knee, leg, and ankle    Osteoarthritis of right hip    Otalgia    Pain in joint, shoulder region    Pain in thoracic spine    Palpitations    Pre-diabetes    Pre-diabetes    Primary localized osteoarthrosis, shoulder region    Log Date: 2013     Rotator cuff tear, right    Splenic cyst    Splenic cyst    Spondylolisthesis at L4-L5 level    Tinnitus    Urticaria    Vitreous floaters of right eye    Vitreous floaters of right eye      Past Surgical History:   Procedure Laterality Date    Carpal tunnel release      Cholecystectomy      Colonoscopy  2013    Dr. Rosas - repeat in     Debridement of nail(s), -2006    debridement of the med border of the left great toenail    Electrocardiogram, complete  2013    Scan to media tab 2013    Electrocardiogram, complete  2013    Scan to media tab 2013    Electrocardiogram, complete  2013    Scan to media tab 2013    Hysterectomy      Knee scope,med/lat menisectomy Left 2016    Procedure: ARTHROSCOPY KNEE LEFT;  Surgeon: Wilmer Garcia MD;  [facility]      Other      thumb surgery    Other surgical history  Carpal Tunnel both hands    Left knee torn meniscus    Removal of foreign body      Sharp debridement, removal of a piece of glass from Hallux valgus, left heel    Tonsillectomy      Total abdom hysterectomy         Family History   Problem Relation Age of Onset    Hypertension Mother     Thyroid Disorder Mother         thyroid problems    Kidney Disease Mother         Chronic kidney disease    Cancer Mother         Cancer-breast    Breast Cancer Mother 84    Dementia Mother     Heart Disease Father         CAD, father passed away in [place], he had what sounds like sudden death    Macular degeneration Father     Cancer Brother         Cancer-prostate    Diabetes Neg     Glaucoma Neg       Social History     Socioeconomic History    Marital status: Single     Spouse name: Not on file    Number of children: Not on file    Years of education: Not on file    Highest education level: Not on file   Occupational History    Not on file   Tobacco Use    Smoking status: Former     Current packs/day: 0.50     Average packs/day: 0.5 packs/day for 2.0 years (1.0 ttl pk-yrs)     Types: Cigarettes     Passive exposure: Past    Smokeless tobacco: Never    Tobacco comments:     long long long time ago   Vaping Use    Vaping status: Never Used   Substance and Sexual Activity    Alcohol use: Not Currently     Comment: Vodka, 2 drinks, yearly    Drug use: No    Sexual activity: Not on file   Other Topics Concern     Service Not Asked    Blood Transfusions Not Asked    Caffeine Concern Yes     Comment: chocolate, no per [employer]    Occupational Exposure Not Asked    Hobby Hazards Not Asked    Sleep Concern Not Asked    Stress Concern Not Asked    Weight Concern Not Asked    Special Diet Not Asked    Back Care Not Asked    Exercise Not Asked    Bike Helmet Not Asked    Seat Belt Not Asked    Self-Exams Not Asked   Social History Narrative    Not on file     Social Determinants of Health     Financial Resource Strain: Low Risk  (6/20/2024)    Received from [facility]    Overall Financial Resource Strain (CARDIA)     Difficulty of Paying Living Expenses: Not hard at all   Food Insecurity: No Food Insecurity  (6/20/2024)    Received from [facility]    Hunger Vital Sign     Worried About Running Out of Food in the Last Year: Never true     Ran Out of Food in the Last Year: Never true   Transportation Needs: No Transportation Needs (6/20/2024)    Received from [facility]    PRAPARE - Transportation     Lack of Transportation (Medical): No     Lack of Transportation (Non-Medical): No   Physical Activity: Not on file   Stress: Not on file   Social Connections: Not on file   Housing Stability: Low Risk  (6/20/2024)    Received from [facility]    Housing Stability Vital Sign     Unable to Pay for Housing in the Last Year: No     Number of Places Lived in the Last Year: 1     In the last 12 months, was there a time when you did not have a steady place to sleep or slept in a shelter (including now)?: No        Allergies:  Allergies   Allergen Reactions    Nitrofurantoin PALPITATIONS     Dizziness,SOB ,blurry vision    Adhesive Tape SWELLING    Celecoxib      Other reaction(s): CELECOXIB    Clindamycin      Other reaction(s): Altered Heart Rate  Other reaction(s): CLINDAMYCIN    Keflex [Cephalexin] SWELLING    Latex      Other reaction(s): LATEX  Other reaction(s): LATEX    Levofloxacin      Other reaction(s): LEVOFLOXACIN  Other reaction(s): LEVOFLOXACIN    Methylprednisolone      Other reaction(s): Altered Heart Rate  Other reaction(s): METHYLPREDNISOLONE  Other reaction(s): METHYLPREDNISOLONE SOD SUCC    Metronidazole      Other reaction(s): METRONIDAZOLE HCL    Omeprazole      Other reaction(s): headaches  Other reaction(s): OMEPRAZOLE  Other reaction(s): Dizzy  Other reaction(s): headaches  Other reaction(s): OMEPRAZOLE MAGNESIUM  Other reaction(s): Dizzy    Sulfa Antibiotics      Other reaction(s): Itching    Sulfanilamide      Other reaction(s): Itching    Tobramycin Sulfate  [Na Metabisulfite-Tobramycin Sulfate]      Other reaction(s): TOBRAMYCIN SULFATE     Tobramycin Sulfate [Na Metabisulfite-Phenol-Tobramycin] OTHER (SEE COMMENTS)     Other reaction(s): eye swelling    Celecoxib RASH    Tobramycin RASH         Medications:  Current Outpatient Medications   Medication Sig Dispense Refill    amoxicillin 875 MG Oral Tab Take 1 tablet (875 mg total) by mouth 2 (two) times daily. 20 tablet 0    meclizine 12.5 MG Oral Tab Take 1 tablet (12.5 mg total) by mouth 3 (three) times daily as needed. 40 tablet 0    clobetasol 0.05 % External Solution       levothyroxine 125 MCG Oral Tab Take 1 tablet every day 90 tablet 3    amLODIPine 2.5 MG Oral Tab Take 1 tablet (2.5 mg total) by mouth daily. 30 tablet 11    metoprolol succinate ER 25 MG Oral Tablet 24 Hr Take 1 tablet (25 mg total) by mouth daily. 90 tablet 3    Multiple Vitamins-Minerals (OCUVITE EXTRA) Oral Tab Take by mouth.      famoTIDine 20 MG Oral Tab Take 1 tablet (20 mg total) by mouth daily.      Cholecalciferol (VITAMIN D) 1000 UNITS Oral Tab Take 1,000 Units by mouth daily.          Review of Systems:  A 10-point system was reviewed.  Pertinent positives and negatives are noted in HPI.      Physical Exam:  /73   Pulse 70   Ht 63\"   Wt 190 lb (86.2 kg)   BMI 33.66 kg/m²         Neurological Exam:    AAOx3, following commands  PERRLA  EOMI  Face symmetrical  Tongue midline  Hearing symmetrical and intact to finger rub    No rhinorrhea or otorrhea    Romberg negative    Motor Strength:  Right hip flexion plus 4 out of 5  Right dorsiflexion plus 4 out of 5  Otherwise 5 out of 5 in bilateral lower extremities    Sensation to light touch:  Patchy sensation loss in bilateral lower extremities, particular in the anterior shin     DTRs: Hypoactive in bilateral lower extremity patellar and Achilles    Abdomen:  Soft, non-distended, non-tender, with no rebound or guarding.  No peritoneal signs.     Extremities:  Non-tender, no lower extremity edema noted.      Labs:  CBC:  Lab Results   Component Value Date    WBC  10.7 01/04/2024    HGB 14.0 01/04/2024    HCT 43.4 01/04/2024    MCV 87.3 01/04/2024    .0 01/04/2024      BMG:  Lab Results   Component Value Date     05/14/2024    K 4.9 05/14/2024    CO2 30.0 05/14/2024     05/14/2024    BUN 14 05/14/2024      INR:  No results found for: \"INR\", \"PROTIME\"       Diagnostics:  No new imaging    Diagnosis:  1. Spinal stenosis of lumbar region with neurogenic claudication    2. Spondylolisthesis of lumbar region    3. Lumbar stenosis with neurogenic claudication      Assessment/Plan:  Codi ACOSTA Cacarmen returns to the office for further surgical discussion.  We discussed in the past a two-level lumbar decompression and fusion to help stabilize the spine and decompress the central canal.  Patient reports resolution of her previous low back and lower extremity pain and would like to delay surgery.  She has an upcoming vacation to [place] which she would like to focus on.  She will return back to our office when she is ready or when her symptoms have worsened.  She has no further questions or concerns.    More than 30 minutes were spent during this visit and the coordination of this patient's care. All questions and concerns were addressed. We appreciate the opportunity to participate in the care of this patient. Please do not hesitate to call our office ([phone]) with any issues.    This note has been dictated utilizing voice recognition software. Unfortunately, this may lead to occasional typographical errors. If there are any questions regarding this, please do not hesitate to contact our office.           Kingsley Osborne PA-C    7/16/2024  11:28 AM

## 2024-07-17 NOTE — TELEPHONE ENCOUNTER
Patient's surgery has been canceled.    Y case change request sent  Y pre op canceled   Y RN navigator apt canceled   Y post ops cancelled  Y removed on [place]  Y removed from surgery sheet         Patient seen in office 7/16/24 and has requested to cancel surgery.   \"Patient reports resolution of her previous low back and lower extremity pain and would like to delay surgery.  She has an upcoming vacation to [place] which she would like to focus on.  She will return back to our office when she is ready or when her symptoms have worsened.\"

## 2024-11-12 ENCOUNTER — OFFICE VISIT (OUTPATIENT)
Dept: INTERNAL MEDICINE CLINIC | Facility: CLINIC | Age: 79
End: 2024-11-12

## 2024-11-12 ENCOUNTER — HOSPITAL ENCOUNTER (EMERGENCY)
Facility: HOSPITAL | Age: 79
Discharge: HOME | End: 2024-11-12
Attending: EMERGENCY MEDICINE
Payer: MEDICARE

## 2024-11-12 ENCOUNTER — LAB ENCOUNTER (OUTPATIENT)
Dept: LAB | Age: 79
End: 2024-11-12
Attending: INTERNAL MEDICINE
Payer: MEDICARE

## 2024-11-12 ENCOUNTER — TELEPHONE (OUTPATIENT)
Dept: INTERNAL MEDICINE CLINIC | Facility: CLINIC | Age: 79
End: 2024-11-12

## 2024-11-12 VITALS
OXYGEN SATURATION: 94 % | DIASTOLIC BLOOD PRESSURE: 68 MMHG | HEART RATE: 65 BPM | SYSTOLIC BLOOD PRESSURE: 124 MMHG | RESPIRATION RATE: 11 BRPM | TEMPERATURE: 98 F

## 2024-11-12 VITALS
HEIGHT: 63 IN | SYSTOLIC BLOOD PRESSURE: 134 MMHG | DIASTOLIC BLOOD PRESSURE: 68 MMHG | OXYGEN SATURATION: 98 % | TEMPERATURE: 98 F | BODY MASS INDEX: 34 KG/M2 | HEART RATE: 68 BPM

## 2024-11-12 DIAGNOSIS — I10 ESSENTIAL HYPERTENSION: ICD-10-CM

## 2024-11-12 DIAGNOSIS — Z00.00 ROUTINE HEALTH MAINTENANCE: ICD-10-CM

## 2024-11-12 DIAGNOSIS — M43.16 SPONDYLOLISTHESIS OF LUMBAR REGION: Primary | ICD-10-CM

## 2024-11-12 DIAGNOSIS — E87.5 HYPERKALEMIA: Primary | ICD-10-CM

## 2024-11-12 DIAGNOSIS — E03.9 ACQUIRED HYPOTHYROIDISM: ICD-10-CM

## 2024-11-12 DIAGNOSIS — M79.671 PAIN OF RIGHT HEEL: ICD-10-CM

## 2024-11-12 DIAGNOSIS — Z12.31 VISIT FOR SCREENING MAMMOGRAM: ICD-10-CM

## 2024-11-12 DIAGNOSIS — R00.2 PALPITATIONS: Primary | ICD-10-CM

## 2024-11-12 DIAGNOSIS — R39.9 UTI SYMPTOMS: ICD-10-CM

## 2024-11-12 LAB
ALBUMIN SERPL-MCNC: 4.7 G/DL (ref 3.2–4.8)
ALBUMIN/GLOB SERPL: 1.6 {RATIO} (ref 1–2)
ALP LIVER SERPL-CCNC: 83 U/L
ALT SERPL-CCNC: 28 U/L
ANION GAP SERPL CALC-SCNC: 5 MMOL/L (ref 0–18)
ANION GAP SERPL CALC-SCNC: 6 MMOL/L (ref 0–18)
AST SERPL-CCNC: 28 U/L (ref ?–34)
BASOPHILS # BLD AUTO: 0.04 X10(3) UL (ref 0–0.2)
BASOPHILS # BLD AUTO: 0.05 X10(3) UL (ref 0–0.2)
BASOPHILS NFR BLD AUTO: 0.3 %
BASOPHILS NFR BLD AUTO: 0.5 %
BILIRUB SERPL-MCNC: 0.8 MG/DL (ref 0.2–1.1)
BUN BLD-MCNC: 12 MG/DL (ref 9–23)
BUN BLD-MCNC: 16 MG/DL (ref 9–23)
BUN/CREAT SERPL: 13.3 (ref 10–20)
BUN/CREAT SERPL: 17.8 (ref 10–20)
CALCIUM BLD-MCNC: 10.3 MG/DL (ref 8.7–10.4)
CALCIUM BLD-MCNC: 9.6 MG/DL (ref 8.7–10.4)
CHLORIDE SERPL-SCNC: 101 MMOL/L (ref 98–112)
CHLORIDE SERPL-SCNC: 105 MMOL/L (ref 98–112)
CHOLEST SERPL-MCNC: 195 MG/DL (ref ?–200)
CO2 SERPL-SCNC: 28 MMOL/L (ref 21–32)
CO2 SERPL-SCNC: 30 MMOL/L (ref 21–32)
CREAT BLD-MCNC: 0.9 MG/DL
CREAT BLD-MCNC: 0.9 MG/DL
DEPRECATED RDW RBC AUTO: 40 FL (ref 35.1–46.3)
DEPRECATED RDW RBC AUTO: 41.1 FL (ref 35.1–46.3)
EGFRCR SERPLBLD CKD-EPI 2021: 65 ML/MIN/1.73M2 (ref 60–?)
EGFRCR SERPLBLD CKD-EPI 2021: 65 ML/MIN/1.73M2 (ref 60–?)
EOSINOPHIL # BLD AUTO: 0.21 X10(3) UL (ref 0–0.7)
EOSINOPHIL # BLD AUTO: 0.24 X10(3) UL (ref 0–0.7)
EOSINOPHIL NFR BLD AUTO: 1.8 %
EOSINOPHIL NFR BLD AUTO: 2.4 %
ERYTHROCYTE [DISTWIDTH] IN BLOOD BY AUTOMATED COUNT: 12.8 % (ref 11–15)
ERYTHROCYTE [DISTWIDTH] IN BLOOD BY AUTOMATED COUNT: 12.8 % (ref 11–15)
FASTING PATIENT LIPID ANSWER: YES
FASTING STATUS PATIENT QL REPORTED: YES
GLOBULIN PLAS-MCNC: 2.9 G/DL (ref 2–3.5)
GLUCOSE BLD-MCNC: 101 MG/DL (ref 70–99)
GLUCOSE BLD-MCNC: 145 MG/DL (ref 70–99)
HCT VFR BLD AUTO: 39.7 %
HCT VFR BLD AUTO: 44.9 %
HDLC SERPL-MCNC: 52 MG/DL (ref 40–59)
HGB BLD-MCNC: 13.3 G/DL
HGB BLD-MCNC: 14.9 G/DL
IMM GRANULOCYTES # BLD AUTO: 0.06 X10(3) UL (ref 0–1)
IMM GRANULOCYTES # BLD AUTO: 0.08 X10(3) UL (ref 0–1)
IMM GRANULOCYTES NFR BLD: 0.6 %
IMM GRANULOCYTES NFR BLD: 0.7 %
LDLC SERPL CALC-MCNC: 123 MG/DL (ref ?–100)
LYMPHOCYTES # BLD AUTO: 1.6 X10(3) UL (ref 1–4)
LYMPHOCYTES # BLD AUTO: 1.62 X10(3) UL (ref 1–4)
LYMPHOCYTES NFR BLD AUTO: 13.4 %
LYMPHOCYTES NFR BLD AUTO: 16.4 %
MAGNESIUM SERPL-MCNC: 2.1 MG/DL (ref 1.6–2.6)
MCH RBC QN AUTO: 28.8 PG (ref 26–34)
MCH RBC QN AUTO: 29.2 PG (ref 26–34)
MCHC RBC AUTO-ENTMCNC: 33.2 G/DL (ref 31–37)
MCHC RBC AUTO-ENTMCNC: 33.5 G/DL (ref 31–37)
MCV RBC AUTO: 85.9 FL
MCV RBC AUTO: 87.9 FL
MONOCYTES # BLD AUTO: 0.72 X10(3) UL (ref 0.1–1)
MONOCYTES # BLD AUTO: 0.9 X10(3) UL (ref 0.1–1)
MONOCYTES NFR BLD AUTO: 7.3 %
MONOCYTES NFR BLD AUTO: 7.5 %
NEUTROPHILS # BLD AUTO: 7.18 X10 (3) UL (ref 1.5–7.7)
NEUTROPHILS # BLD AUTO: 7.18 X10(3) UL (ref 1.5–7.7)
NEUTROPHILS # BLD AUTO: 9.12 X10 (3) UL (ref 1.5–7.7)
NEUTROPHILS # BLD AUTO: 9.12 X10(3) UL (ref 1.5–7.7)
NEUTROPHILS NFR BLD AUTO: 72.8 %
NEUTROPHILS NFR BLD AUTO: 76.3 %
NONHDLC SERPL-MCNC: 143 MG/DL (ref ?–130)
OSMOLALITY SERPL CALC.SUM OF ELEC: 284 MOSM/KG (ref 275–295)
OSMOLALITY SERPL CALC.SUM OF ELEC: 290 MOSM/KG (ref 275–295)
PLATELET # BLD AUTO: 304 10(3)UL (ref 150–450)
PLATELET # BLD AUTO: 330 10(3)UL (ref 150–450)
POTASSIUM SERPL-SCNC: 4 MMOL/L (ref 3.5–5.1)
POTASSIUM SERPL-SCNC: 5.6 MMOL/L (ref 3.5–5.1)
PROT SERPL-MCNC: 7.6 G/DL (ref 5.7–8.2)
RBC # BLD AUTO: 4.62 X10(6)UL
RBC # BLD AUTO: 5.11 X10(6)UL
SODIUM SERPL-SCNC: 135 MMOL/L (ref 136–145)
SODIUM SERPL-SCNC: 140 MMOL/L (ref 136–145)
TRIGL SERPL-MCNC: 110 MG/DL (ref 30–149)
TSI SER-ACNC: 1.56 UIU/ML (ref 0.55–4.78)
TSI SER-ACNC: 1.64 UIU/ML (ref 0.55–4.78)
VLDLC SERPL CALC-MCNC: 19 MG/DL (ref 0–30)
WBC # BLD AUTO: 12 X10(3) UL (ref 4–11)
WBC # BLD AUTO: 9.9 X10(3) UL (ref 4–11)

## 2024-11-12 PROCEDURE — 99214 OFFICE O/P EST MOD 30 MIN: CPT | Performed by: INTERNAL MEDICINE

## 2024-11-12 PROCEDURE — 36415 COLL VENOUS BLD VENIPUNCTURE: CPT | Performed by: INTERNAL MEDICINE

## 2024-11-12 PROCEDURE — G0008 ADMIN INFLUENZA VIRUS VAC: HCPCS | Performed by: INTERNAL MEDICINE

## 2024-11-12 PROCEDURE — 80053 COMPREHEN METABOLIC PANEL: CPT | Performed by: INTERNAL MEDICINE

## 2024-11-12 PROCEDURE — 36415 COLL VENOUS BLD VENIPUNCTURE: CPT

## 2024-11-12 PROCEDURE — 1159F MED LIST DOCD IN RCRD: CPT | Performed by: INTERNAL MEDICINE

## 2024-11-12 PROCEDURE — 90656 IIV3 VACC NO PRSV 0.5 ML IM: CPT | Performed by: INTERNAL MEDICINE

## 2024-11-12 PROCEDURE — 1170F FXNL STATUS ASSESSED: CPT | Performed by: INTERNAL MEDICINE

## 2024-11-12 PROCEDURE — 99284 EMERGENCY DEPT VISIT MOD MDM: CPT

## 2024-11-12 PROCEDURE — 80061 LIPID PANEL: CPT | Performed by: INTERNAL MEDICINE

## 2024-11-12 PROCEDURE — 85025 COMPLETE CBC W/AUTO DIFF WBC: CPT | Performed by: INTERNAL MEDICINE

## 2024-11-12 PROCEDURE — 93010 ELECTROCARDIOGRAM REPORT: CPT

## 2024-11-12 PROCEDURE — 93005 ELECTROCARDIOGRAM TRACING: CPT

## 2024-11-12 PROCEDURE — 85025 COMPLETE CBC W/AUTO DIFF WBC: CPT | Performed by: EMERGENCY MEDICINE

## 2024-11-12 PROCEDURE — 84443 ASSAY THYROID STIM HORMONE: CPT | Performed by: INTERNAL MEDICINE

## 2024-11-12 PROCEDURE — 84443 ASSAY THYROID STIM HORMONE: CPT | Performed by: EMERGENCY MEDICINE

## 2024-11-12 PROCEDURE — 1125F AMNT PAIN NOTED PAIN PRSNT: CPT | Performed by: INTERNAL MEDICINE

## 2024-11-12 PROCEDURE — 83735 ASSAY OF MAGNESIUM: CPT | Performed by: EMERGENCY MEDICINE

## 2024-11-12 NOTE — TELEPHONE ENCOUNTER
Patient contacted and relayed 's message below regarding lab results. Patient verbalized understanding and will get the repeat potassium drawn tomorrow.

## 2024-11-12 NOTE — TELEPHONE ENCOUNTER
Please let Codi know that her lab results showed that her potassium was elevated.  She has had this problem before.  I am wondering if it is when she has a lab draw outside of the hospital where the blood needs to sit before it is transported to the hospital to check.    It is elevated so we do need to check it.  I did order a potassium recheck.    Please ask her to see if she can go tomorrow.  She does not have to fast.    The rest of her labs look acceptable.    Thank you.

## 2024-11-12 NOTE — PROGRESS NOTES
Codi Perdomo is a 79 year old female.  HPI:     Chief Complaint   Patient presents with    Checkup     6 month follow up- right ankle pain- would like an x ray      Codi presents for 6-month checkup.    She has hypertension.  Blood pressure under good control    She has hypothyroidism.  On supplement.  Will update TSH    She did not want just the regular flu vaccine formulation for less than 65 I honored her request.    She wishes not to pursue COVID booster.  I did give her RSV vaccine literature.  She had PCV 20 July 2024    I did give her mammogram for next February 2025    She had DEXA scan June 2023 that showed osteopenia.  Will repeat DEXA scan June 2025    She does wear hearing aids for hard of hearing.    She went on a trip to [place] and enjoyed it and did not have any problems with her lower back.  She has elected not to have surgery on her lower back DJD changes.    She has some right heel pain on and off.  This may be plantar fasciitis or other associated conditions.  I did give her contact information to Dr. Deal.  Current Outpatient Medications   Medication Sig Dispense Refill    meclizine 12.5 MG Oral Tab Take 1 tablet (12.5 mg total) by mouth 3 (three) times daily as needed. 40 tablet 0    clobetasol 0.05 % External Solution       levothyroxine 125 MCG Oral Tab Take 1 tablet every day 90 tablet 3    metoprolol succinate ER 25 MG Oral Tablet 24 Hr Take 1 tablet (25 mg total) by mouth daily. 90 tablet 3    Multiple Vitamins-Minerals (OCUVITE EXTRA) Oral Tab Take by mouth.      famoTIDine 20 MG Oral Tab Take 1 tablet (20 mg total) by mouth daily.      Cholecalciferol (VITAMIN D) 1000 UNITS Oral Tab Take 1,000 Units by mouth daily.      amoxicillin 875 MG Oral Tab Take 1 tablet (875 mg total) by mouth 2 (two) times daily. (Patient not taking: Reported on 11/12/2024) 20 tablet 0      Past Medical History:    Acute diverticulitis    Acute meniscal tear of left knee    MRI done by  shows small  joint effusion and moderate-sized Baker's cyst and complex medial meniscal tear.  MRI done December 9, 2015     Age-related nuclear cataract of both eyes    Age-related nuclear cataract of both eyes    Baker's cyst    Shah cyst noted on left knee by MRI scan December 9, 2015     Cervicalgia    Condition of the tongue    Dermatitis    Dermatophytosis    Disorder of coccyx    Displacement of lumbar intervertebral disc without myelopathy    Diverticulitis    Diverticulitis    Diverticulitis    CT scan August 6, 2018 showing acute sigmoid diverticulitis with developing phlegmon/abscess left iliac fossa.  Stable complex probably benign cystic splenic lesion measuring 7.3×5.4×7.7 cm unchanged from previous exam.  Hepatic steatosis noted.  Cholecystectomy noted.  Also noted is severe multifactorial spinal stenosis at L4-L5 and chronic wedge compression lower thoracic vertebrae.  Degenerativ    Diverticulitis of colon    Endometriosis    Esophageal reflux    Esophageal reflux    Essential hypertension    Fatty liver    Foreign body in foot, left    Foreign body left heel, Hallux valgus, sharp debridement, removal of a piece glass    Glaucoma suspect of both eyes    Glaucoma suspect of both eyes    Hearing loss    Herpes zoster    History of hysterectomy    HTN (hypertension)    Hypothyroidism    Left groin pain    Meniere's disease    Open wound of knee, leg, and ankle    Osteoarthritis of right hip    Otalgia    Pain in joint, shoulder region    Pain in thoracic spine    Palpitations    Pre-diabetes    Pre-diabetes    Primary localized osteoarthrosis, shoulder region    Log Date: 04/24/2013     Rotator cuff tear, right    Splenic cyst    Splenic cyst    Spondylolisthesis at L4-L5 level    Tinnitus    Urticaria    Vitreous floaters of right eye    Vitreous floaters of right eye      Social History:  Social History     Socioeconomic History    Marital status: Single   Tobacco Use    Smoking status: Former     Current  packs/day: 0.50     Average packs/day: 0.5 packs/day for 2.0 years (1.0 ttl pk-yrs)     Types: Cigarettes     Passive exposure: Past    Smokeless tobacco: Never    Tobacco comments:     long long long time ago   Vaping Use    Vaping status: Never Used   Substance and Sexual Activity    Alcohol use: Not Currently     Comment: Vodka, 2 drinks, yearly    Drug use: No   Other Topics Concern    Caffeine Concern Yes     Comment: chocolate, no per [employer]     Social Drivers of Health     Financial Resource Strain: Low Risk  (6/20/2024)    Received from [facility]    Overall Financial Resource Strain (CARDIA)     Difficulty of Paying Living Expenses: Not hard at all   Food Insecurity: No Food Insecurity (6/20/2024)    Received from [facility]    Hunger Vital Sign     Worried About Running Out of Food in the Last Year: Never true     Ran Out of Food in the Last Year: Never true   Transportation Needs: No Transportation Needs (6/20/2024)    Received from [facility]    PRAPARE - Transportation     Lack of Transportation (Medical): No     Lack of Transportation (Non-Medical): No   Housing Stability: Low Risk  (6/20/2024)    Received from [facility]    Housing Stability Vital Sign     Unable to Pay for Housing in the Last Year: No     Number of Places Lived in the Last Year: 1     Unstable Housing in the Last Year: No        REVIEW OF SYSTEMS:   GENERAL HEALTH:  feels well otherwise  RESPIRATORY:  Voices no shortness of breath with exertion or cough  CARDIOVASCULAR:  Voices no chest pain on exertion or shortness of breath  GI:   Voices no abdominal pain or changes of bowels   :Viices no urning or frequency of urination.  NEURO:  Voices no  headaches or dizziness    EXAM:   /68   Pulse 68   Temp 97.8 °F (36.6 °C)   Ht 5' 3\" (1.6 m)   SpO2 98%   BMI 33.66 kg/m²     GENERAL:  well developed, well nourished, in no apparent  distress  SKIN:  no rashes   HEENT: atraumatic.  TM's normal. Canals clear.  Pharynx normal without exudate.  EYES:  PERRL. Sclera anicteric.  NECK:  Supple,  no adenopathy,    LUNGS:  clear to auscultation.  Effort normal  CARDIO:  RRR without murmur.   S1 and S2 normal  GI:  good BS's,  no masses,   HSM or tenderness  EXTREMITIES : no cyanosis, clubbing or edema    Note last breast exam was done May 2024    ASSESSMENT AND PLAN:     1. Spondylolisthesis of lumbar region  She indicates that she is doing very well.  She is elected not to have surgery.  She is experiencing no lower extremity leg weakness.  - Physical Therapy Referral - External    2. Essential hypertension  Pressure under good control.  Continue current therapy  - CBC With Differential With Platelet  - Comp Metabolic Panel (14)  - TSH W Reflex To Free T4  - Lipid Panel    3. Acquired hypothyroidism  On supplement.  Check TSH  - CBC With Differential With Platelet  - Comp Metabolic Panel (14)  - TSH W Reflex To Free T4  - Lipid Panel    4. Routine health maintenance  See above for vaccine discussion    5. Visit for screening mammogram  Mammogram order given to her for next February 2025  - [facility] 2D+3D SCREENING BILAT (CPT=77067/80524); Future    This visit was 30 minutes.  I spent 10 minutes before visit preparing and reviewing old records.  Greater than 50% of the visit was engaged in counseling and review of past data.    Follow-up in 6 months    The patient indicates understanding of these issues and agrees to the plan.    Trino Evans MD  11/12/2024  8:14 AM

## 2024-11-13 ENCOUNTER — LAB ENCOUNTER (OUTPATIENT)
Dept: LAB | Age: 79
End: 2024-11-13
Attending: INTERNAL MEDICINE
Payer: MEDICARE

## 2024-11-13 LAB
ATRIAL RATE: 74 BPM
BILIRUB UR QL: NEGATIVE
CLARITY UR: CLEAR
COLOR UR: YELLOW
GLUCOSE UR-MCNC: NORMAL MG/DL
HGB UR QL STRIP.AUTO: NEGATIVE
KETONES UR-MCNC: NEGATIVE MG/DL
LEUKOCYTE ESTERASE UR QL STRIP.AUTO: 25
NITRITE UR QL STRIP.AUTO: NEGATIVE
P AXIS: 54 DEGREES
P-R INTERVAL: 168 MS
PH UR: 5 [PH] (ref 5–8)
PROT UR-MCNC: NEGATIVE MG/DL
Q-T INTERVAL: 392 MS
QRS DURATION: 82 MS
QTC CALCULATION (BEZET): 435 MS
R AXIS: 2 DEGREES
SP GR UR STRIP: 1.01 (ref 1–1.03)
T AXIS: -2 DEGREES
UROBILINOGEN UR STRIP-ACNC: NORMAL
VENTRICULAR RATE: 74 BPM

## 2024-11-13 PROCEDURE — 87077 CULTURE AEROBIC IDENTIFY: CPT | Performed by: INTERNAL MEDICINE

## 2024-11-13 PROCEDURE — 87086 URINE CULTURE/COLONY COUNT: CPT | Performed by: INTERNAL MEDICINE

## 2024-11-13 PROCEDURE — 81001 URINALYSIS AUTO W/SCOPE: CPT | Performed by: INTERNAL MEDICINE

## 2024-11-13 NOTE — TELEPHONE ENCOUNTER
Patient called to let Dr Evans know she went to the ER last night due to palpitations   Potassium test was done - results are down  Glucose was high   (patient had just finished dinner)    Wonders if she may have a UTI as this happened last year after she experienced palpitations    Requests call back [phone]

## 2024-11-13 NOTE — DISCHARGE INSTRUCTIONS
Please discuss lab results with your doctor.  Return for changes or worsening.  Call your cardiologist tomorrow to get reevaluated and to discuss possible testing for the palpitations.  Avoid caffeine.  Return for changes worsening and read all instructions.

## 2024-11-13 NOTE — TELEPHONE ENCOUNTER
To MARY JO Su...    I did call pt and left message fro her to call back so more details may be obtained

## 2024-11-13 NOTE — ED INITIAL ASSESSMENT (HPI)
Pt to ed w/c of abnormal labs. Pt reports having blood work done at pcp and getting called to be told her K level is above 5. Pt reports palpitations, denies cp or sob, appears anxious over situation In no apparent distress.

## 2024-11-13 NOTE — TELEPHONE ENCOUNTER
UTI symptoms:    [x]Frequency  []Urgency  []Pain/burning  []Blood in urine  []Low back pain  []Flank pain  [x]Fevers/chills:yesterday   [x]Odor:during the week - resolved  []Confusion    NOTES:    As FYI to  - called patient who states that she had a urinary tract infection last time when she was in ER for palpitations - UA and culture ordered per protocol

## 2024-11-13 NOTE — ED PROVIDER NOTES
Patient Seen in: [facility] Emergency Department      History     Chief Complaint   Patient presents with    Abnormal Labs     Stated Complaint: abnormal labs, elevated K+    Subjective:   79-year-old female history of mitral valve insufficiency as well as hypertension here with a chief complaint of palpitations and high potassium.  Patient has been having intermittent palpitations for the last 3 days and has had these on and off for a very long time.  They last anywhere from 30 to 60 minutes.  She says she feels rapid palpitations and pulse beating in her neck and head but is not actually pain.  No chest pain or shortness of breath during this.  No syncope.  She is usually just sitting there but noticed the last couple days has after eating.  Earlier today she was told she had a high potassium level and this made her very anxious and nervous and then she thought she was eating the wrong foods making her potassium go higher and that is why she was having palpitations.  She does endorse that she has had a home monitor before for a month              Objective:     Past Medical History:    Acute diverticulitis    Acute meniscal tear of left knee    MRI done by  shows small joint effusion and moderate-sized Baker's cyst and complex medial meniscal tear.  MRI done December 9, 2015     Age-related nuclear cataract of both eyes    Age-related nuclear cataract of both eyes    Baker's cyst    Shah cyst noted on left knee by MRI scan December 9, 2015     Cervicalgia    Condition of the tongue    Dermatitis    Dermatophytosis    Disorder of coccyx    Displacement of lumbar intervertebral disc without myelopathy    Diverticulitis    Diverticulitis    Diverticulitis    CT scan August 6, 2018 showing acute sigmoid diverticulitis with developing phlegmon/abscess left iliac fossa.  Stable complex probably benign cystic splenic lesion measuring 7.3×5.4×7.7 cm unchanged from previous exam.  Hepatic steatosis  noted.  Cholecystectomy noted.  Also noted is severe multifactorial spinal stenosis at L4-L5 and chronic wedge compression lower thoracic vertebrae.  Degenerativ    Diverticulitis of colon    Endometriosis    Esophageal reflux    Esophageal reflux    Essential hypertension    Fatty liver    Foreign body in foot, left    Foreign body left heel, Hallux valgus, sharp debridement, removal of a piece glass    Glaucoma suspect of both eyes    Glaucoma suspect of both eyes    Hearing loss    Herpes zoster    History of hysterectomy    HTN (hypertension)    Hypothyroidism    Left groin pain    Meniere's disease    Open wound of knee, leg, and ankle    Osteoarthritis of right hip    Otalgia    Pain in joint, shoulder region    Pain in thoracic spine    Palpitations    Pre-diabetes    Pre-diabetes    Primary localized osteoarthrosis, shoulder region    Log Date: 2013     Rotator cuff tear, right    Splenic cyst    Splenic cyst    Spondylolisthesis at L4-L5 level    Tinnitus    Urticaria    Vitreous floaters of right eye    Vitreous floaters of right eye              Past Surgical History:   Procedure Laterality Date    Carpal tunnel release      Cholecystectomy      Colonoscopy  2013    Dr. Rosas - repeat in     Debridement of nail(s), -2006    debridement of the med border of the left great toenail    Electrocardiogram, complete  2013    Scan to media tab 2013    Electrocardiogram, complete  2013    Scan to media tab 2013    Electrocardiogram, complete  2013    Scan to media tab 2013    Hysterectomy      Knee scope,med/lat menisectomy Left 2016    Procedure: ARTHROSCOPY KNEE LEFT;  Surgeon: Wilmer Garcia MD;  [place] [facility] [place] [facility]      Other      thumb surgery    Other surgical history  Carpal Tunnel both hands    Left knee torn meniscus    Removal of foreign body      Sharp debridement, removal of a piece of glass from Hallux valgus, left  heel    Tonsillectomy      Total abdom hysterectomy                  Social History     Socioeconomic History    Marital status:    Tobacco Use    Smoking status: Former     Current packs/day: 0.50     Average packs/day: 0.5 packs/day for 2.0 years (1.0 ttl pk-yrs)     Types: Cigarettes     Passive exposure: Past    Smokeless tobacco: Never    Tobacco comments:     long long long time ago   Vaping Use    Vaping status: Never Used   Substance and Sexual Activity    Alcohol use: Not Currently     Comment: Vodka, 2 drinks, yearly    Drug use: No   Other Topics Concern    Caffeine Concern Yes     Comment: chocolate, no per [employer]     Social Drivers of Health     Financial Resource Strain: Low Risk  (6/20/2024)    Received from [facility]    Overall Financial Resource Strain (CARDIA)     Difficulty of Paying Living Expenses: Not hard at all   Food Insecurity: No Food Insecurity (6/20/2024)    Received from [facility]    Hunger Vital Sign     Worried About Running Out of Food in the Last Year: Never true     Ran Out of Food in the Last Year: Never true   Transportation Needs: No Transportation Needs (6/20/2024)    Received from [facility]    PRAPARE - Transportation     Lack of Transportation (Medical): No     Lack of Transportation (Non-Medical): No   Housing Stability: Low Risk  (6/20/2024)    Received from [facility]    Housing Stability Vital Sign     Unable to Pay for Housing in the Last Year: No     Number of Places Lived in the Last Year: 1     Unstable Housing in the Last Year: No                  Physical Exam     ED Triage Vitals [11/12/24 2022]   /88   Pulse 89   Resp 22   Temp 97.6 °F (36.4 °C)   Temp src    SpO2 98 %   O2 Device None (Room air)       Current Vitals:   Vital Signs  BP: 124/68  Pulse: 64  Resp: 17  Temp: 97.6 °F (36.4 °C)  MAP (mmHg): 85    Oxygen Therapy  SpO2: 95 %  O2 Device: None (Room  air)        Physical Exam  HENT:      Head: Normocephalic.      Mouth/Throat:      Mouth: Mucous membranes are moist.      Pharynx: Oropharynx is clear.   Eyes:      Extraocular Movements: Extraocular movements intact.      Pupils: Pupils are equal, round, and reactive to light.   Cardiovascular:      Rate and Rhythm: Normal rate and regular rhythm.      Heart sounds: Normal heart sounds.   Pulmonary:      Effort: Pulmonary effort is normal.      Breath sounds: Normal breath sounds.   Abdominal:      Palpations: Abdomen is soft.      Tenderness: There is no abdominal tenderness.   Musculoskeletal:         General: No swelling or tenderness. Normal range of motion.      Cervical back: Normal range of motion.   Lymphadenopathy:      Cervical: No cervical adenopathy.   Skin:     General: Skin is warm.      Capillary Refill: Capillary refill takes less than 2 seconds.   Neurological:      General: No focal deficit present.      Mental Status: She is alert.             ED Course     Labs Reviewed   CBC WITH DIFFERENTIAL WITH PLATELET - Abnormal; Notable for the following components:       Result Value    WBC 12.0 (*)     Neutrophil Absolute Prelim 9.12 (*)     Neutrophil Absolute 9.12 (*)     All other components within normal limits   BASIC METABOLIC PANEL (8) - Abnormal; Notable for the following components:    Glucose 145 (*)     Sodium 135 (*)     All other components within normal limits   MAGNESIUM - Normal   TSH W REFLEX TO FREE T4 - Normal   RAINBOW DRAW LAVENDER   RAINBOW DRAW LIGHT GREEN   RAINBOW DRAW BLUE     EKG    Rate, intervals and axes as noted on EKG Report.  Rate: 74  Rhythm: Sinus Rhythm  Reading: minimal lvh criteria,  no st elevation,            ED Course as of 11/12/24 2215  ------------------------------------------------------------  Time: 11/12 2211  Comment: Patient doing well.  Still remains in sinus rhythm.  Labs independently interpreted by me and the potassium is normal but she had slight  hyponatremia slight leukocytosis.  Her platelets were normal.  TSH normal magnesium normal.  Pulse ox normal 95% on room air.  She had no interest in being admitted.  I reviewed Dr. Claros's notes and she will call him tomorrow.  Son was present for the conversation.              MDM      No results found.          Medical Decision Making  Patient with history of hypertension, mitral insufficiency, palpitations in the past here with 3 days of intermittent palpitations along with being told she had high potassium today.  The original plan was to go to the office tomorrow but she became very scared and thought her food choices were making her potassium go higher and causing her more palpitations.  She has been eating very healthy such as broccoli so it does not seem to be related to any stimulants she is ingesting.  Patient could be having arrhythmia such as SVT or A-fib.  She has been evaluated for this before.  She has had a home monitor.  When I suggested she may need 1 again she said she will never wear that again.  She does not really have any interested in being admitted for this and her son is at bedside.  She is agreeable to a workup.  We will check the potassium to see if its gone up from earlier.  She reports it was 4.6.    Amount and/or Complexity of Data Reviewed  Independent Historian:      Details: son  External Data Reviewed: labs, radiology and notes.     Details: Echo this year was not concerning.  Good ejection fraction.  I also reviewed Dr. Roe's office notes from June and his plan.  He mentioned the intermittent palpitations.  Labs: ordered. Decision-making details documented in ED Course.  ECG/medicine tests: ordered and independent interpretation performed. Decision-making details documented in ED Course.  Discussion of management or test interpretation with external provider(s): See ed course    Risk  Risk Details: Essential hypertension   Mitral valve insufficiency          Disposition and  Plan     Clinical Impression:  1. Palpitations         Disposition:  Discharge  11/12/2024 10:11 pm    Follow-up:  Trino Evans MD  [address] [facility] [address]  [phone]    Follow up in 1 day(s)      Hugo Casas MD  [address]  [facility] [address]  [phone]    Follow up      We recommend that you schedule follow up care with a primary care provider within the next three months to obtain basic health screening including reassessment of your blood pressure.      Medications Prescribed:  Current Discharge Medication List              Supplementary Documentation:

## 2024-11-14 NOTE — TELEPHONE ENCOUNTER
To Dr Foreman,( Pt of Dr BRONSON Hussein)    Please advise on positive UA cx results. Thank you    contains abnormal data Urine Culture, Routine [E]  Order: [id number]   Collected 11/13/2024 12:36 PM       Status: Final result       Dx: UTI symptoms    Specimen Information: Urine, clean catch   0 Result Notes       1 Follow-up Encounter  URINE CULTURE <10,000 CFU/ML Streptococcus agalactiae (Group B beta strep)

## 2024-11-15 ENCOUNTER — TELEPHONE (OUTPATIENT)
Dept: INTERNAL MEDICINE CLINIC | Facility: CLINIC | Age: 79
End: 2024-11-15

## 2024-11-15 NOTE — TELEPHONE ENCOUNTER
To Dr DOBSON,    S/w patient and relayed MD message.  Verbalizes understanding and agreement with tx. plan     Pt will call Dr Roe to schedule an appointment today    She denied any symptoms of urinary tract infection infection when asked and sated \" I am feeling really good the past 2 days\"

## 2024-11-15 NOTE — TELEPHONE ENCOUNTER
Please call Codi with the following.    Please let her know that I was able to review the emergency room notes recently.  For her heart palpitations she should see her cardiologist Dr. Roe.    2.    Her urine culture came out good without any significant bacteria.  She had called recently because she thought she had a UTI.  Please ask her about her current urinary symptoms.

## 2024-12-27 RX ORDER — LEVOTHYROXINE SODIUM 125 UG/1
TABLET ORAL
Qty: 90 TABLET | Refills: 3 | Status: SHIPPED | OUTPATIENT
Start: 2024-12-27

## 2024-12-27 NOTE — TELEPHONE ENCOUNTER
Refill request is for a maintenance medication and has met the criteria specified in the Ambulatory Medication Refill Standing Order for eligibility, visits, laboratory, alerts and was sent to the requested pharmacy.    Requested Prescriptions     Signed Prescriptions Disp Refills    levothyroxine 125 MCG Oral Tab 90 tablet 3     Sig: TAKE 1 TABLET BY MOUTH EVERY DAY     Authorizing Provider: MARÍA DENNY     Ordering User: WICHO OWUSU

## 2025-02-14 ENCOUNTER — HOSPITAL ENCOUNTER (OUTPATIENT)
Dept: MAMMOGRAPHY | Age: 80
Discharge: HOME | End: 2025-02-14
Attending: INTERNAL MEDICINE
Payer: MEDICARE

## 2025-02-14 DIAGNOSIS — Z12.31 VISIT FOR SCREENING MAMMOGRAM: ICD-10-CM

## 2025-02-14 PROCEDURE — 77063 BREAST TOMOSYNTHESIS BI: CPT | Performed by: INTERNAL MEDICINE

## 2025-02-14 PROCEDURE — 77067 SCR MAMMO BI INCL CAD: CPT | Performed by: INTERNAL MEDICINE

## 2025-02-18 ENCOUNTER — TELEPHONE (OUTPATIENT)
Dept: INTERNAL MEDICINE CLINIC | Facility: CLINIC | Age: 80
End: 2025-02-18

## 2025-05-05 NOTE — DISCHARGE INSTRUCTIONS
Follow-up with your primary care doctor to discuss management of your chronic pain.  Return to the ER if you have swelling of your leg, painful redness, fevers, chest pain, shortness of breath, or other new and concerning symptoms.  
No (0)

## 2025-05-13 ENCOUNTER — EKG ENCOUNTER (OUTPATIENT)
Dept: LAB | Age: 80
End: 2025-05-13
Attending: INTERNAL MEDICINE
Payer: MEDICARE

## 2025-05-13 ENCOUNTER — OFFICE VISIT (OUTPATIENT)
Dept: INTERNAL MEDICINE CLINIC | Facility: CLINIC | Age: 80
End: 2025-05-13

## 2025-05-13 ENCOUNTER — LAB ENCOUNTER (OUTPATIENT)
Dept: LAB | Age: 80
End: 2025-05-13
Attending: INTERNAL MEDICINE
Payer: MEDICARE

## 2025-05-13 VITALS
SYSTOLIC BLOOD PRESSURE: 136 MMHG | DIASTOLIC BLOOD PRESSURE: 80 MMHG | HEIGHT: 63 IN | OXYGEN SATURATION: 96 % | BODY MASS INDEX: 34 KG/M2 | HEART RATE: 61 BPM | TEMPERATURE: 98 F

## 2025-05-13 DIAGNOSIS — Z00.00 ROUTINE HEALTH MAINTENANCE: ICD-10-CM

## 2025-05-13 DIAGNOSIS — M43.16 SPONDYLOLISTHESIS OF LUMBAR REGION: ICD-10-CM

## 2025-05-13 DIAGNOSIS — I10 ESSENTIAL HYPERTENSION: ICD-10-CM

## 2025-05-13 DIAGNOSIS — R94.31 ABNORMAL EKG: ICD-10-CM

## 2025-05-13 DIAGNOSIS — E03.9 ACQUIRED HYPOTHYROIDISM: ICD-10-CM

## 2025-05-13 DIAGNOSIS — Z00.00 MEDICARE ANNUAL WELLNESS VISIT, SUBSEQUENT: Primary | ICD-10-CM

## 2025-05-13 LAB
ALBUMIN SERPL-MCNC: 4.4 G/DL (ref 3.2–4.8)
ALBUMIN/GLOB SERPL: 1.8 {RATIO} (ref 1–2)
ALP LIVER SERPL-CCNC: 70 U/L (ref 55–142)
ALT SERPL-CCNC: 22 U/L (ref 10–49)
ANION GAP SERPL CALC-SCNC: 6 MMOL/L (ref 0–18)
AST SERPL-CCNC: 20 U/L (ref ?–34)
ATRIAL RATE: 58 BPM
BASOPHILS # BLD AUTO: 0.04 X10(3) UL (ref 0–0.2)
BASOPHILS NFR BLD AUTO: 0.5 %
BILIRUB SERPL-MCNC: 0.7 MG/DL (ref 0.2–1.1)
BUN BLD-MCNC: 13 MG/DL (ref 9–23)
BUN/CREAT SERPL: 15.3 (ref 10–20)
CALCIUM BLD-MCNC: 9.5 MG/DL (ref 8.7–10.4)
CHLORIDE SERPL-SCNC: 105 MMOL/L (ref 98–112)
CHOLEST SERPL-MCNC: 174 MG/DL (ref ?–200)
CO2 SERPL-SCNC: 30 MMOL/L (ref 21–32)
CREAT BLD-MCNC: 0.85 MG/DL (ref 0.55–1.02)
DEPRECATED RDW RBC AUTO: 41.7 FL (ref 35.1–46.3)
EGFRCR SERPLBLD CKD-EPI 2021: 70 ML/MIN/1.73M2 (ref 60–?)
EOSINOPHIL # BLD AUTO: 0.29 X10(3) UL (ref 0–0.7)
EOSINOPHIL NFR BLD AUTO: 3.3 %
ERYTHROCYTE [DISTWIDTH] IN BLOOD BY AUTOMATED COUNT: 13.2 % (ref 11–15)
FASTING PATIENT LIPID ANSWER: YES
FASTING STATUS PATIENT QL REPORTED: YES
GLOBULIN PLAS-MCNC: 2.5 G/DL (ref 2–3.5)
GLUCOSE BLD-MCNC: 94 MG/DL (ref 70–99)
HCT VFR BLD AUTO: 41.6 % (ref 35–48)
HDLC SERPL-MCNC: 52 MG/DL (ref 40–59)
HGB BLD-MCNC: 13.4 G/DL (ref 12–16)
IMM GRANULOCYTES # BLD AUTO: 0.04 X10(3) UL (ref 0–1)
IMM GRANULOCYTES NFR BLD: 0.5 %
LDLC SERPL CALC-MCNC: 104 MG/DL (ref ?–100)
LYMPHOCYTES # BLD AUTO: 1.74 X10(3) UL (ref 1–4)
LYMPHOCYTES NFR BLD AUTO: 20 %
MCH RBC QN AUTO: 28 PG (ref 26–34)
MCHC RBC AUTO-ENTMCNC: 32.2 G/DL (ref 31–37)
MCV RBC AUTO: 87 FL (ref 80–100)
MONOCYTES # BLD AUTO: 0.62 X10(3) UL (ref 0.1–1)
MONOCYTES NFR BLD AUTO: 7.1 %
NEUTROPHILS # BLD AUTO: 5.99 X10 (3) UL (ref 1.5–7.7)
NEUTROPHILS # BLD AUTO: 5.99 X10(3) UL (ref 1.5–7.7)
NEUTROPHILS NFR BLD AUTO: 68.6 %
NONHDLC SERPL-MCNC: 122 MG/DL (ref ?–130)
OSMOLALITY SERPL CALC.SUM OF ELEC: 292 MOSM/KG (ref 275–295)
P AXIS: 55 DEGREES
P-R INTERVAL: 162 MS
PLATELET # BLD AUTO: 314 10(3)UL (ref 150–450)
POTASSIUM SERPL-SCNC: 5 MMOL/L (ref 3.5–5.1)
PROT SERPL-MCNC: 6.9 G/DL (ref 5.7–8.2)
Q-T INTERVAL: 414 MS
QRS DURATION: 84 MS
QTC CALCULATION (BEZET): 406 MS
R AXIS: 12 DEGREES
RBC # BLD AUTO: 4.78 X10(6)UL (ref 3.8–5.3)
SODIUM SERPL-SCNC: 141 MMOL/L (ref 136–145)
T AXIS: 3 DEGREES
TRIGL SERPL-MCNC: 100 MG/DL (ref 30–149)
TSI SER-ACNC: 0.62 UIU/ML (ref 0.55–4.78)
VENTRICULAR RATE: 58 BPM
VIT D+METAB SERPL-MCNC: 37.7 NG/ML (ref 30–100)
VLDLC SERPL CALC-MCNC: 17 MG/DL (ref 0–30)
WBC # BLD AUTO: 8.7 X10(3) UL (ref 4–11)

## 2025-05-13 PROCEDURE — 1160F RVW MEDS BY RX/DR IN RCRD: CPT | Performed by: INTERNAL MEDICINE

## 2025-05-13 PROCEDURE — 96160 PT-FOCUSED HLTH RISK ASSMT: CPT | Performed by: INTERNAL MEDICINE

## 2025-05-13 PROCEDURE — 84443 ASSAY THYROID STIM HORMONE: CPT | Performed by: INTERNAL MEDICINE

## 2025-05-13 PROCEDURE — G0439 PPPS, SUBSEQ VISIT: HCPCS | Performed by: INTERNAL MEDICINE

## 2025-05-13 PROCEDURE — 1159F MED LIST DOCD IN RCRD: CPT | Performed by: INTERNAL MEDICINE

## 2025-05-13 PROCEDURE — 80061 LIPID PANEL: CPT | Performed by: INTERNAL MEDICINE

## 2025-05-13 PROCEDURE — 85025 COMPLETE CBC W/AUTO DIFF WBC: CPT | Performed by: INTERNAL MEDICINE

## 2025-05-13 PROCEDURE — 80053 COMPREHEN METABOLIC PANEL: CPT | Performed by: INTERNAL MEDICINE

## 2025-05-13 PROCEDURE — 1170F FXNL STATUS ASSESSED: CPT | Performed by: INTERNAL MEDICINE

## 2025-05-13 PROCEDURE — 93005 ELECTROCARDIOGRAM TRACING: CPT

## 2025-05-13 PROCEDURE — 99214 OFFICE O/P EST MOD 30 MIN: CPT | Performed by: INTERNAL MEDICINE

## 2025-05-13 PROCEDURE — 82306 VITAMIN D 25 HYDROXY: CPT

## 2025-05-13 PROCEDURE — 36415 COLL VENOUS BLD VENIPUNCTURE: CPT | Performed by: INTERNAL MEDICINE

## 2025-05-13 PROCEDURE — 93010 ELECTROCARDIOGRAM REPORT: CPT | Performed by: STUDENT IN AN ORGANIZED HEALTH CARE EDUCATION/TRAINING PROGRAM

## 2025-05-13 PROCEDURE — 1125F AMNT PAIN NOTED PAIN PRSNT: CPT | Performed by: INTERNAL MEDICINE

## 2025-05-13 RX ORDER — MECLIZINE HCL 12.5 MG 12.5 MG/1
12.5 TABLET ORAL 3 TIMES DAILY PRN
Qty: 40 TABLET | Refills: 1 | Status: SHIPPED | OUTPATIENT
Start: 2025-05-13 | End: 2025-05-13

## 2025-05-13 RX ORDER — METOPROLOL SUCCINATE 25 MG/1
25 TABLET, EXTENDED RELEASE ORAL DAILY
Qty: 90 TABLET | Refills: 1 | Status: SHIPPED | OUTPATIENT
Start: 2025-05-13

## 2025-05-13 RX ORDER — AMLODIPINE BESYLATE 2.5 MG/1
2.5 TABLET ORAL DAILY
Qty: 90 TABLET | Refills: 1 | Status: SHIPPED | OUTPATIENT
Start: 2025-05-13

## 2025-05-13 RX ORDER — MECLIZINE HCL 12.5 MG 12.5 MG/1
12.5 TABLET ORAL 3 TIMES DAILY PRN
Qty: 40 TABLET | Refills: 1 | Status: SHIPPED | OUTPATIENT
Start: 2025-05-13

## 2025-05-13 RX ORDER — AMOXICILLIN 875 MG/1
875 TABLET, COATED ORAL 2 TIMES DAILY
Qty: 14 TABLET | Refills: 0 | Status: SHIPPED | OUTPATIENT
Start: 2025-05-13

## 2025-05-13 RX ORDER — AMLODIPINE BESYLATE 2.5 MG/1
2.5 TABLET ORAL DAILY
COMMUNITY
Start: 2025-05-07 | End: 2025-05-13

## 2025-05-13 RX ORDER — LEVOTHYROXINE SODIUM 125 UG/1
TABLET ORAL
Qty: 90 TABLET | Refills: 1 | Status: SHIPPED | OUTPATIENT
Start: 2025-05-13

## 2025-05-13 NOTE — PROGRESS NOTES
Codi Perdomo is a 79 year old female.  HPI:     Chief Complaint   Patient presents with    Physical      Codi presents for Medicare annual wellness visit    She generally feels well.    She for about 2 months she has had very small area of left upper quadrant pain only when she ate something \"red\".  Such as spaghetti or other bread products.  If she did not eat those types of foods she would not get the pain.  No nausea or vomiting.  No reflux.  No changes in bowels.  Last colonoscopy was October 2022.    She indicates it is getting better and will not get the pain if she does not eat anything that is red.  We talked about this.  We talked about getting a CT scan but both of us feel for now that is not necessary.  She will continue to monitor and call if symptoms persist or return.    We talked about vaccines.  She had PCV 20 vaccine July 2024.  She knows about availability of Shingrix.  She knows about the availability of RSV vaccine.  She wishes not to have any more COVID boosters    Will get updated labs today.    She had her mammogram February 2025.  I did offer a breast exam but she prefers me not to do her breast exam today.    She follows with cardiology.  Dr. Roe.    I did indicate to her that she had an EKG from the emergency room November 2024.  This showed some nonspecific T wave changes in the anterior leads.  She is asymptomatic.  I did give her a copy of this EKG.  Will repeat EKG when she gets her labs.  She will show her cardiologist the EKG from November 2024.  She indicates he usually does some cardiac testing for her.    She has a history of diverticulitis.  She wishes me to give her a prescription for amoxicillin.  Not Augmentin.  But amoxicillin just to \"have on hand\" if she gets any symptoms but she agrees to call me if she should get any diverticulitis symptoms.  She has had a reaction to sulfa, metronidazole, we both floxacillin and Keflex.  I do realize amoxicillin would not be the  primary treatment for her diverticulitis but this has worked for her in the past and she is requesting this and I think this is somewhat reasonable as she would call if she gets any recurrent diverticulitis and we could then make necessary changes to treatment if needed.  Current Medications[1]   Past Medical History[2]   Social History:  Short Social Hx on File[3]     REVIEW OF SYSTEMS:   GENERAL HEALTH:  feels well otherwise  RESPIRATORY:  Voices no shortness of breath with exertion or cough  CARDIOVASCULAR:  Voices no chest pain on exertion or shortness of breath  GI:   Voices no abdominal pain or changes of bowels   :Viices no urning or frequency of urination.  NEURO:  Voices no  headaches or dizziness    EXAM:   /80   Pulse 61   Temp 97.6 °F (36.4 °C)   Ht 5' 3\" (1.6 m)   SpO2 96%   BMI 33.66 kg/m²     GENERAL:  well developed, well nourished, in no apparent distress  SKIN:  no rashes , no suspicious lesions  HEENT: atraumatic.  Pharynx normal without exudate.  EYES:  PERRL. Sclera anicteric.  NECK:  Supple,  no adenopathy,  thyroid normal  LUNGS:  clear to auscultation.  Effort normal  CARDIO:  RRR without murmur.   S1 and S2 normal  GI:  good BS's,  no masses,   HSM or tenderness  EXTREMITIES : no cyanosis, clubbing or edema      General Health     In the past six months, have you lost more than 10 pounds without trying?: 2 - No    Has your appetite been poor?: No    Type of Diet: Balanced    How does the patient maintain a good energy level?: Daily Walks, Stretching    How would you describe your daily physical activity?: Moderate    How would you describe your current health state?: Good    How do you maintain positive mental well-being?: Social Interaction, Puzzles, Games, Visiting Friends, Visiting Family         Have you had any immunizations at another office such as Influenza, Hepatitis B, Tetanus, or Pneumococcal?: No     Functional Ability     Bathing or Showering: Able without  help    Toileting: Able without help    Dressing: Able without help    Eating: Able without help    Driving: Able without help    Preparing your meals: Able without help    Managing money/bills: Able without help    Taking medications as prescribed: Able without help    Are you able to afford your medications?: Yes    Hearing Problems?: Yes     Functional Status     Hearing Problems?: Yes    Vision Problems? : Yes    Difficulty walking?: No    Difficulty dressing or bathing?: No    Problems with daily activities? : No    Memory Problems?: No      Fall/Risk Assessment                                                              Depression Screening (PHQ-2/PHQ-9): Over the LAST 2 WEEKS                      Advance Directives     Do you have a healthcare power of ?: Yes    Do you have a living will?: Yes     Hearing Assessment (Required for AWV/SWV)      Hearing Screening    Time taken: 5/13/2025  8:02 AM  Screening Method: Whisper Test  Whisper Test Result: Fail (Comment: hearing aids)         Visual Acuity                   Cognitive Assessment     What day of the week is this?: Correct    What month is it?: Correct    What year is it?: Correct    Recall \"Ball\": Correct    Recall \"Flag\": Correct    Recall \"Tree\": Correct        Codi Perdomo's SCREENING SCHEDULE   Tests on this list are recommended by your physician but may not be covered, or covered at this frequency, by your insurer. Please check with your insurance carrier before scheduling to verify coverage.   PREVENTATIVE SERVICES  INDICATIONS AND SCHEDULE Internal Lab or Procedure External Lab or Procedure   Diabetes Screening      HbgA1C   Annually HEMOGLOBIN A1C (%)   Date Value   12/16/2016 6.0 (A)     HgbA1C (%)   Date Value   11/27/2023 5.8 (H)         No data to display                Fasting Blood Sugar (FSB)Annually Glucose (mg/dL)   Date Value   11/12/2024 145 (H)   12/16/2015 102 (H)     GLUCOSE (mg/dL)   Date Value   04/21/2011 88          No data to display               Cardiovascular Disease Screening     LDL Annually LDL Cholesterol (mg/dL)   Date Value   11/12/2024 123 (H)     LDL-CHOLESTEROL (mg/dL (calc))   Date Value   04/21/2011 100        EKG One Time     Colorectal Cancer Screening      Colonoscopy Screen every 10 years Health Maintenance   Topic Date Due    Colorectal Cancer Screening  Discontinued    Update Health Maintenance if applicable    Flex Sigmoidoscopy Screen every 5 years No results found for this or any previous visit.      No data to display                 Fecal Occult Blood Annually No results found for: \"FOB\", \"OCCULTSTOOL\"      No data to display                Glaucoma Screening      Ophthalmology Visit Annually     Bone Density Screening      Dexascan Every two years Last Dexa Scan:    XR DEXA BONE DENSITOMETRY (CPT=77080) 06/07/2023        No data to display               Pap and Pelvic      Pap: Every 3 yrs age 21-65 or Pap+HPV every 5 yrs age 30-65, age 65 and older at high risk   No recommendations at this time Update Health Maintenance if applicable    Chlamydia  Annually if high risk No results found for: \"CHLAMYDIA\"      No data to display                Screening Mammogram      Mammogram  Annually Health Maintenance   Topic Date Due    Mammogram  Discontinued    Update Health Maintenance if applicable   Immunizations      Influenza No orders found for this or any previous visit. Update Immunization Activity if applicable    Pneumococcal No orders found for this or any previous visit. Update Immunization Activity if applicable    Hepatitis B No orders found for this or any previous visit. Update Immunization Activity if applicable    Tetanus No orders found for this or any previous visit. Update Immunization Activity if applicable    Zoster (Not covered by Medicare Part B) No orders found for this or any previous visit. Update Immunization Activity if applicable     SPECIFIC DISEASE MONITORING Internal Lab or  Procedure External Lab or Procedure   Annual Monitoring of Persistent     Medications (ACE/ARB, digoxin, diuretics)    Potassium  Annually Potassium (mmol/L)   Date Value   11/12/2024 4.0   12/16/2015 5.70 (H)     POTASSIUM (mmol/L)   Date Value   04/21/2011 4.7     POTASSIUM (P) (mmol/L)   Date Value   06/23/2016 4.8         No data to display                Creatinine  Annually CREATININE (mg/dL)   Date Value   04/21/2011 0.82     Creatinine (mg/dL)   Date Value   11/12/2024 0.90   12/16/2015 0.90         No data to display                Digoxin Serum Conc  Annually No results found for: \"DIGOXIN\"      No data to display                Diabetes      HgbA1C  Annually HEMOGLOBIN A1C (%)   Date Value   12/16/2016 6.0 (A)     HgbA1C (%)   Date Value   11/27/2023 5.8 (H)         No data to display                Creat/alb ratio  Annually      LDL  Annually LDL Cholesterol (mg/dL)   Date Value   11/12/2024 123 (H)     LDL-CHOLESTEROL (mg/dL (calc))   Date Value   04/21/2011 100         No data to display                 Dilated Eye exam  Annually     10/24/2019     8:39 AM   Data entered on:   Last Dilated Eye Exam 10/24/2019          No data to display                COPD      Spirometry Testing Annually No results found for this or any previous visit.      No data to display                       ASSESSMENT AND PLAN:     1. Medicare annual wellness visit, subsequent  Medicare annual wellness visit    2. Spondylolisthesis of lumbar region  Asymptomatic.  She is doing well.  At 1 point surgery was recommended but she had second thoughts and wish not to pursue surgery and so she is doing well without any neurological symptoms in her legs.    3. Essential hypertension  Blood pressure under satisfactory control.  - CBC With Differential With Platelet  - Comp Metabolic Panel (14)  - Lipid Panel  - TSH W Reflex To Free T4  - EKG 12 Lead to be performed at [facility]; Future    4. Acquired hypothyroidism  On  levothyroxine.  Update labs  - CBC With Differential With Platelet  - Comp Metabolic Panel (14)  - Lipid Panel  - TSH W Reflex To Free T4  - Vitamin D; Future    5. Routine health maintenance  See above for vaccine discussion    6. Abnormal EKG  Repeat EKG.  She will follow with her cardiologist as above.  She is asymptomatic  - EKG 12 Lead to be performed at [facility]; Future    This visit was 30 minutes.  I spent 10 minutes before visit preparing and reviewing old records.  Greater than 50% of the visit was engaged in counseling and review of past data.    Problem list as noted in electronic health record reviewed.  Stable.  Will be followed.    She does know that I will be retiring with last day June 13 of this year.  She indicates that she will transition her care to Dr. Trino Gilmore.  She will make an appointment to see him maybe in July to establish herself with him.    Does know that her records will be transferred to  after I retire.    The patient indicates understanding of these issues and agrees to the plan.    Trino Evans MD  5/13/2025  8:28 AM         [1]   Current Outpatient Medications   Medication Sig Dispense Refill    amoxicillin 875 MG Oral Tab Take 1 tablet (875 mg total) by mouth 2 (two) times daily. 14 tablet 0    clobetasol 0.05 % External Solution  (Patient taking differently: Apply 1 Application topically as needed.)      Multiple Vitamins-Minerals (OCUVITE EXTRA) Oral Tab Take by mouth.      famoTIDine 20 MG Oral Tab Take 1 tablet (20 mg total) by mouth daily.      Cholecalciferol (VITAMIN D) 1000 UNITS Oral Tab Take 1,000 Units by mouth daily.      amLODIPine 2.5 MG Oral Tab Take 1 tablet (2.5 mg total) by mouth daily. 90 tablet 1    levothyroxine 125 MCG Oral Tab TAKE 1 TABLET BY MOUTH EVERY DAY 90 tablet 1    metoprolol succinate ER 25 MG Oral Tablet 24 Hr Take 1 tablet (25 mg total) by mouth daily. 90 tablet 1    meclizine 12.5 MG Oral Tab Take 1 tablet (12.5  mg total) by mouth 3 (three) times daily as needed. 40 tablet 1   [2]   Past Medical History:   Acute diverticulitis    Acute meniscal tear of left knee    MRI done by  shows small joint effusion and moderate-sized Baker's cyst and complex medial meniscal tear.  MRI done December 9, 2015     Age-related nuclear cataract of both eyes    Age-related nuclear cataract of both eyes    Baker's cyst    Shah cyst noted on left knee by MRI scan December 9, 2015     Cervicalgia    Condition of the tongue    Dermatitis    Dermatophytosis    Disorder of coccyx    Displacement of lumbar intervertebral disc without myelopathy    Diverticulitis    Diverticulitis    Diverticulitis    CT scan August 6, 2018 showing acute sigmoid diverticulitis with developing phlegmon/abscess left iliac fossa.  Stable complex probably benign cystic splenic lesion measuring 7.3×5.4×7.7 cm unchanged from previous exam.  Hepatic steatosis noted.  Cholecystectomy noted.  Also noted is severe multifactorial spinal stenosis at L4-L5 and chronic wedge compression lower thoracic vertebrae.  Degenerativ    Diverticulitis of colon    Endometriosis    Esophageal reflux    Esophageal reflux    Essential hypertension    Fatty liver    Foreign body in foot, left    Foreign body left heel, Hallux valgus, sharp debridement, removal of a piece glass    Glaucoma suspect of both eyes    Glaucoma suspect of both eyes    Hearing loss    Herpes zoster    History of hysterectomy    HTN (hypertension)    Hypothyroidism    Left groin pain    Meniere's disease    Open wound of knee, leg, and ankle    Osteoarthritis of right hip    Otalgia    Pain in joint, shoulder region    Pain in thoracic spine    Palpitations    Pre-diabetes    Pre-diabetes    Primary localized osteoarthrosis, shoulder region    Log Date: 04/24/2013     Rotator cuff tear, right    Splenic cyst    Splenic cyst    Spondylolisthesis at L4-L5 level    Tinnitus    Urticaria    Vitreous floaters of  right eye    Vitreous floaters of right eye   [3]   Social History  Socioeconomic History    Marital status:    Tobacco Use    Smoking status: Former     Current packs/day: 0.50     Average packs/day: 0.5 packs/day for 2.0 years (1.0 ttl pk-yrs)     Types: Cigarettes     Passive exposure: Past    Smokeless tobacco: Never    Tobacco comments:     long long long time ago   Vaping Use    Vaping status: Never Used   Substance and Sexual Activity    Alcohol use: Yes     Comment: rarely    Drug use: No   Other Topics Concern    Caffeine Concern Yes     Comment: chocolate, no per [employer]     Social Drivers of Health     Food Insecurity: No Food Insecurity (5/13/2025)    NCSS - Food Insecurity     Worried About Running Out of Food in the Last Year: No     Ran Out of Food in the Last Year: No   Transportation Needs: No Transportation Needs (5/13/2025)    NCSS - Transportation     Lack of Transportation: No   Housing Stability: Not At Risk (5/13/2025)    NCSS - Housing/Utilities     Has Housing: Yes     Worried About Losing Housing: No     Unable to Get Utilities: No

## 2025-05-14 ENCOUNTER — TELEPHONE (OUTPATIENT)
Dept: INTERNAL MEDICINE CLINIC | Facility: CLINIC | Age: 80
End: 2025-05-14

## 2025-05-14 NOTE — TELEPHONE ENCOUNTER
Please let Codi know I thought her test results came out very satisfactory.    Cholesterol results just minimally elevated.    Please make copy for her that she can either  or we can mail it to her so she can show her cardiologist Dr. Roe her results.    Thank you.

## 2025-05-14 NOTE — TELEPHONE ENCOUNTER
Called patient and relayed DR. DOBSON message - verbalized understanding   Copy of labs mailed to patient

## (undated) DIAGNOSIS — I10 ESSENTIAL HYPERTENSION: Primary | ICD-10-CM

## (undated) DIAGNOSIS — E03.9 ACQUIRED HYPOTHYROIDISM: ICD-10-CM

## (undated) NOTE — ED AVS SNAPSHOT
Perri Saqib   MRN: [id number]    Department:  [facility] Emergency Department   Date of Visit:  5/25/2018           Disclosure     Insurance plans vary and the physician(s) referred by the ER may not be covered by your plan.  Please contac within the next three months to obtain basic health screening including reassessment of your blood pressure.     IF THERE IS ANY CHANGE OR WORSENING OF YOUR CONDITION, CALL YOUR PRIMARY CARE PHYSICIAN AT ONCE OR RETURN IMMEDIATELY TO THE EMERGENCY DEPARTMEN

## (undated) NOTE — ED AVS SNAPSHOT
Robyn Arias   MRN: [id number]    Department:  [facility] Emergency Department   Date of Visit:  1/2/2020           Disclosure     Insurance plans vary and the physician(s) referred by the ER may not be covered by your plan.  Please contact within the next three months to obtain basic health screening including reassessment of your blood pressure.     IF THERE IS ANY CHANGE OR WORSENING OF YOUR CONDITION, CALL YOUR PRIMARY CARE PHYSICIAN AT ONCE OR RETURN IMMEDIATELY TO THE EMERGENCY DEPARTMEN

## (undated) NOTE — LETTER
24    Patient: Codi KARLA Perdomo    : 1945      Dear Dr. Roe,    This letter is to inform you that your patient has been scheduled for surgery with Dr. Nielsen on 24 at [facility]. Cardiac clearance has been requested within 30 days of surgery. We have asked the patient to contact your office to schedule an appointment for cardiac clearance.    Diagnosis: lumbar spondylolisthesis   Procedure: Right L3-4, L4-5 laminectomy and L4-5 interbody fusion     A Pre-operative History & Physical is needed for cardiac clearance. Please address patient's active problems (i.e high blood pressure, arrhythmia, etc.)    Pre-op labs will be done through the [facility] department or through the PCP office.      Clearance Approved   ____________    Clearance Denied       ____________      Comments: ______________________________________________________    Signature: ________________________________  Date: _________________    Please fax clearance letter/office visit note to our office at fax #: [phone] with determination of clearance status.      If you have any questions, you may contact our office at [phone], option # 2.    Thank you,  Codi MARY RN, BSN  Clinical Nurse Lead  [facility]

## (undated) NOTE — LETTER
Hospital Discharge Documentation  Please phone to schedule a hospital follow up appointment.     From: [address] Hospitalist's Office  Phone: [phone]    Patient discharged time/date: 8/8/2018  3:24 PM  Patient discharge disposition:  Home or Self Regardless she received IV piperacillin/tazobactam without any problems, and her problem with this medication she called amoxicillin 875 from Dr. Jacki Bhatt was she had small caliber hard stools from it that made her abdomen cramp.   I do not believe that that CONDITION ON DISCHARGE:  Stable. CODE STATUS:  Full Code. DIET:  Full liquid, to advance to low residue, soft, and a low cardiac on Friday. ACTIVITIES:  No heavy exercise. Otherwise, as tolerated.     FOLLOWUP:  Dr. Charlie Brito in 2 weeks, Dr. Eve Hernández

## (undated) NOTE — ED AVS SNAPSHOT
Emma Query   MRN: [id number]    Department:  [facility] Emergency Department   Date of Visit:  9/24/2018           Disclosure     Insurance plans vary and the physician(s) referred by the ER may not be covered by your plan.  Please contac within the next three months to obtain basic health screening including reassessment of your blood pressure.     IF THERE IS ANY CHANGE OR WORSENING OF YOUR CONDITION, CALL YOUR PRIMARY CARE PHYSICIAN AT ONCE OR RETURN IMMEDIATELY TO THE EMERGENCY DEPARTMEN

## (undated) NOTE — ED AVS SNAPSHOT
Lubna Pierre   MRN: [id number]    Department:  [facility] Emergency Department   Date of Visit:  7/26/2018           Disclosure     Insurance plans vary and the physician(s) referred by the ER may not be covered by your plan.  Please contac within the next three months to obtain basic health screening including reassessment of your blood pressure.     IF THERE IS ANY CHANGE OR WORSENING OF YOUR CONDITION, CALL YOUR PRIMARY CARE PHYSICIAN AT ONCE OR RETURN IMMEDIATELY TO THE EMERGENCY DEPARTMEN

## (undated) NOTE — ED AVS SNAPSHOT
Aimee Madrid   MRN: [id number]    Department:  [facility] Emergency Department   Date of Visit:  8/15/2017           Disclosure     Insurance plans vary and the physician(s) referred by the ER may not be covered by your plan.  Please contac CARE PHYSICIAN AT ONCE OR RETURN IMMEDIATELY TO THE EMERGENCY DEPARTMENT. If you have been prescribed any medication(s), please fill your prescription right away and begin taking the medication(s) as directed.   If you believe that any of the medications

## (undated) NOTE — LETTER
October 24, 2019    Samir Voss MD  [address] [id number]     Patient: Марина Banegas   YOB: 1945   Date of Visit: 10/24/2019       Dear Dr. Steve Meza MD:    Thank you for referring Josiah Olson to me for evaluati piece glass   • Glaucoma suspect of both eyes 10/24/2019   • Hearing loss 8/19/2013   • HTN (hypertension)    • Hypothyroidism    • Meniere's disease    • Osteoarthritis of right hip 6/23/2016   • Pre-diabetes 10/20/2017   • Primary localized osteoarthrosi Types: Cigarettes      Smokeless tobacco: Never Used    Alcohol use: Yes      Comment: Vodka, 2 drinks, yearly    Drug use: No      Medications:  famoTIDine 20 MG Oral Tab, Take 20 mg by mouth 2 (two) times daily. , Disp: , Rfl:   Metoprolol Succinate Sulfanilamide               Comment:Other reaction(s): Itching  Tobramycin Sulfate *        Comment:Other reaction(s): TOBRAMYCIN SULFATE  Tobramycin Sulfate *    OTHER (SEE COMMENTS)    Comment:Other reaction(s): eye swelling  Celecoxib               RASH Right +1.50 +1.00 010    Left +1.00 +1.25 170    Type:  Reading only          Manifest Refraction    Patient is happy with her glasses                  ASSESSMENT/PLAN:     Diagnoses and Plan:     Pre-diabetes  Pre- diabetes: no background of retinopathy, No prescriptions requested or ordered in this encounter        Follow up instructions:  Return for Next avail, VF,OCT and pachy w/ no MD, If glaucoma diagnostics are wnl, 1 yr dil EE.    10/24/2019  Scribed by: Aletha May MD      If you have questio

## (undated) NOTE — LETTER
24  RE: Codi Perdomo     : 1945    Dear Dr. Evans,    This letter is to inform you that your patient has been scheduled for surgery with Dr. Nielsen on 24 at [facility]. Pre-operative clearance has been requested within 30 days of surgery.  We have asked the patient to contact your office to schedule a pre-operative visit.     Diagnosis: lumbar spondylolisthesis   Procedure: Right L3-4, L4-5 laminectomy and L4-5 interbody fusion      A Pre-operative History & Physical is needed for medical clearance within 30 days of surgery. Please address patient's active problems and potential risks of having surgery considering their medical history.  Pre-op labs are scheduled through the [facility] department. If any labs/testing are being done through the PCP office, then results should be faxed to the pre-admission testing department at [facility] at [phone]. Our pre-operative lab orders are located in our surgery order, if the patient would like these done through your office, you will need to place separate orders.     Please fax clearance letter/office visit note to our office at fax #: [phone]. Your office note must clearly indicate if the patient is medically cleared for surgery or not.    The following orders will be placed by pre-admission testing:  CBC  CMP  Type and Screen   PT/PTT/INR  MRSA/MSSA Nasal Swab  (*And any other pertinent testing based on patient's current clinical condition.)    If you have any questions, you may contact our office at [phone], option # 2.    Thank you,  Codi MARY RN, BSN  Clinical Nurse Lead  [facility]